# Patient Record
Sex: FEMALE | Race: WHITE | NOT HISPANIC OR LATINO | Employment: OTHER | URBAN - NONMETROPOLITAN AREA
[De-identification: names, ages, dates, MRNs, and addresses within clinical notes are randomized per-mention and may not be internally consistent; named-entity substitution may affect disease eponyms.]

---

## 2017-03-28 ENCOUNTER — TRANSCRIBE ORDERS (OUTPATIENT)
Dept: PHYSICAL THERAPY | Facility: HOSPITAL | Age: 62
End: 2017-03-28

## 2017-03-28 DIAGNOSIS — M54.16 LUMBAR RADICULOPATHY: Primary | ICD-10-CM

## 2017-04-05 ENCOUNTER — HOSPITAL ENCOUNTER (OUTPATIENT)
Dept: PHYSICAL THERAPY | Facility: HOSPITAL | Age: 62
Setting detail: THERAPIES SERIES
Discharge: HOME | End: 2017-04-05

## 2017-04-05 DIAGNOSIS — M54.16 LUMBAR RADICULOPATHY: Primary | ICD-10-CM

## 2017-04-05 PROCEDURE — 97162 PT EVAL MOD COMPLEX 30 MIN: CPT | Performed by: PHYSICAL THERAPIST

## 2017-04-05 PROCEDURE — G8978 MOBILITY CURRENT STATUS: HCPCS | Performed by: PHYSICAL THERAPIST

## 2017-04-05 PROCEDURE — G8979 MOBILITY GOAL STATUS: HCPCS | Performed by: PHYSICAL THERAPIST

## 2017-04-06 NOTE — PROGRESS NOTES
"    Outpatient Physical Therapy Ortho Initial Evaluation  [facility] [place]     Patient Name: Genesis Salgado  : 1955  MRN: [id number]  Today's Date: 2017      Visit Date: 2017  Attendance:  (Medicare cap)  Subjective Improvement: N/A  Next MD Appt: none scheduled  Recert Date: 17    Therapy Diagnosis: s/p lumbar discectomy    There is no problem list on file for this patient.       Past Medical History:   Diagnosis Date   • Adie's syndrome    • Cancer     colon cancer   • Fracture of ankle, closed     R and L   • Hiatal hernia    • Lumbar herniated disc    • Lupus    • Osteoporosis    • Shingles     x2   • T12 compression fracture         Past Surgical History:   Procedure Laterality Date   • BILATERAL BREAST REDUCTION     • COLON RESECTION     • COLON SURGERY     • FIXATION KYPHOPLASTY THORACIC SPINE      T12   • LUMBAR DISCECTOMY     • TRIGGER FINGER RELEASE Right        Visit Dx:     ICD-10-CM ICD-9-CM   1. Lumbar radiculopathy M54.16 724.4     Medications: patient to bring list        Patient History       17 0900          History    Chief Complaint Pain   \"I'm still tight.\"  -      Type of Pain Back pain  -      Date Current Problem(s) Began --   Dec 2016; Surgery 4 weeks ago  -      Brief Description of Current Complaint Moved 20 [employer] boxes up steps. A few days later, her back started hurting her. The week after [place], she slipped and fell down a ramp. A day or 2 after that, her pain increased. Underwent lumbar discectomy approximately 1 month ago. Since surgery, the pain has basically been relieved. She notes a tightness or \"sting-like\" sensation when she bends over. She feels stiff and tight. \"I'm not back to myself.\" She reports that Dr. Strickland told her that she can walk, line dance, swim, and ride bicycle. She is not to run, jog, or lift over 20#.  female with adult children. She resides in a single story house with 3 to enter the house and none inside. " "Her hobbies include: line dancing, mow yard, ride bicycle.   -SS      Patient/Caregiver Goals --   \"I'd like to be where I was before I hurt my back.\"  -SS      Current Tobacco Use none  -SS      Smoking Status no  -SS      Patient's Rating of General Health Good  -SS      Hand Dominance right-handed  -SS      Occupation/sports/leisure activities retired; Hobbies: yard work, line dance, ride bicycle  -SS      Pain     Pain Location Back  -SS      Pain at Present 0  -SS      Pain at Best 0  -SS      Pain at Worst 4   over past 1 month  -SS      Pain Frequency Intermittent  -SS      Pain Description Burning  -SS      What Performance Factors Make the Current Problem(s) WORSE? walk a while, prolonged standing, prolonged dancing  -SS      What Performance Factors Make the Current Problem(s) BETTER? rest, sitting down  -SS      Is your sleep disturbed? No  -SS      Is medication used to assist with sleep? No  -SS      Fall Risk Assessment    Any falls in the past year: Yes  -SS      Number of falls reported in the last 12 months --   <5  -SS      Factors that contributed to the fall: Tripped;Slippery surface  -SS      Does patient have a fear of falling No  -SS      Daily Activities    Primary Language English  -SS      Safety    Are you being hurt, hit, or frightened by anyone at home or in your life? No  -SS      Are you being neglected by a caregiver No  -SS        User Key  (r) = Recorded By, (t) = Taken By, (c) = Cosigned By    Initials Name Provider Type     Magdiel Nava, PT Physical Therapist                PT Ortho       04/05/17 0900    Subjective Comments    Subjective Comments see Therapy Patient History  -SS    Precautions and Contraindications    Precautions no lifting > 20#  -SS    Subjective Pain    Able to rate subjective pain? yes  -SS    Pre-Treatment Pain Level 0  -SS    Post-Treatment Pain Level 0  -SS    Posture/Observations    Alignment Options Thoracic kyphosis;Forward head  -SS    " "Forward Head Mild  -SS    Thoracic Kyphosis Increased  -SS    Posture/Observations Comments Well healed incision low back midline  -SS    Sensation    Additional Comments Sensation decreased to light touch L L3 dermatome and dorsal L foot  -SS    Lumbosacral Palpation    SI --   non-tender  -SS    Lumbosacral Segment --   non-tender  -SS    Thoracolumbar Segment --   non-tender  -SS    Piriformis --   non-tender  -SS    Gluteus Art --   non-tender  -SS    Quadratus Lumborum Right:;Guarded/taut  -SS    Erector Spinae (Paraspinals) Left:;Tender  -SS    Trunk    Flexion AROM Deficit fingertips to tibial tubercles with c/o tightness across low back  -SS    Extension AROM Deficit 50%  -SS    Lt Lat Flexion AROM Deficit fingertips to fibular head w/ c/o tightness  -SS    Right Lateral Flexion AROM Deficit fingertips 2\" prox to fib head w/ c/o tightness  -SS    MMT (Manual Muscle Testing)    General MMT Assessment no strength deficits identified  -SS    General MMT Assessment Detail lower extremity myotomes B 5/5  -SS    Gait Assessment/Treatment    Gait, Comment non-antalgic without deviation noted this date.  -SS      User Key  (r) = Recorded By, (t) = Taken By, (c) = Cosigned By    Initials Name Provider Type    KISHAN Nava, PT Physical Therapist                            Therapy Education       04/05/17 2000          Therapy Education    Given --   proposed therapy POC  -SS      How Provided Verbal  -SS      Provided to Patient  -SS      Level of Understanding Verbalized  -SS        User Key  (r) = Recorded By, (t) = Taken By, (c) = Cosigned By    Initials Name Provider Type    KISHAN Nava, SUKUMAR Physical Therapist                PT OP Goals       04/05/17 2000       PT Short Term Goals    STG Date to Achieve --   deferred  -SS     Long Term Goals    LTG Date to Achieve 05/03/17  -SS     LTG 1 Independent with HEP  -SS     LTG 2 Verbalize body/lifting mechanics without cueing  -SS     LTG 3 " Minimal pain/discomfort with line dancing  -     Time Calculation    PT Goal Re-Cert Due Date 04/26/17  -       User Key  (r) = Recorded By, (t) = Taken By, (c) = Cosigned By    Initials Name Provider Type     Magdiel Nvaa, PT Physical Therapist                PT Assessment/Plan       04/05/17 0900       PT Assessment    Functional Limitations Limitation in home management;Performance in leisure activities  -     Impairments Range of motion;Endurance  -     Assessment Comments Patient is approx 1 month s/p discectomy. Some tightness and discomfort exists. Patient would benefit from body mechanics instruction as well as therapy for ROM, gentle stretching, and core strengthening.  -     Rehab Potential Good  -     Patient/caregiver participated in establishment of treatment plan and goals Yes  -     Patient would benefit from skilled therapy intervention Yes  -SS     PT Plan    PT Frequency 2x/week  -     Predicted Duration of Therapy Intervention (days/wks) 3-4 weeks  -     PT Plan Comments aquatics, ROM, stretching, strengthening MFR, IFC estim with MHP, possible US to LB  -       User Key  (r) = Recorded By, (t) = Taken By, (c) = Cosigned By    Initials Name Provider Type     Magdiel Nava, PT Physical Therapist                  Exercises       04/05/17 0900          Subjective Comments    Subjective Comments see Therapy Patient History  -      Subjective Pain    Able to rate subjective pain? yes  -SS      Pre-Treatment Pain Level 0  -SS      Post-Treatment Pain Level 0  -SS        User Key  (r) = Recorded By, (t) = Taken By, (c) = Cosigned By    Initials Name Provider Type     Magdiel Nava, PT Physical Therapist                              Outcome Measures       04/05/17 2000          Modified Oswestry    Modified Oswestry Score/Comments 20%  -      Functional Assessment    Outcome Measure Options Modifed Owestry  -        User Key  (r) = Recorded By, (t)  = Taken By, (c) = Cosigned By    Initials Name Provider Type    SS Magdiel Nava, PT Physical Therapist            Time Calculation:   Start Time: 0916  Stop Time: 1003  Time Calculation (min): 47 min     Therapy Charges for Today     Code Description Service Date Service Provider Modifiers Qty    63158701240 HC PT MOBILITY CURRENT 4/5/2017 Magdiel Nava, PT GP, CJ 1    04145392252 HC PT MOBILITY PROJECTED 4/5/2017 Magdiel Nava, PT GP, CI 1    18623033017 HC PT EVAL MOD COMPLEXITY 3 4/5/2017 Magdiel Nava, PT GP 1          PT G-Codes  Outcome Measure Options: Modified Owestry  Score: 20%  Functional Limitation: Mobility: Walking and moving around  Mobility: Walking and Moving Around Current Status (): At least 20 percent but less than 40 percent impaired, limited or restricted  Mobility: Walking and Moving Around Goal Status (): At least 1 percent but less than 20 percent impaired, limited or restricted         Magdiel Nava, PT  4/5/2017

## 2017-04-11 ENCOUNTER — HOSPITAL ENCOUNTER (OUTPATIENT)
Dept: PHYSICAL THERAPY | Facility: HOSPITAL | Age: 62
Setting detail: THERAPIES SERIES
Discharge: HOME | End: 2017-04-11

## 2017-04-11 DIAGNOSIS — M54.16 LUMBAR RADICULOPATHY: Primary | ICD-10-CM

## 2017-04-11 PROCEDURE — 97110 THERAPEUTIC EXERCISES: CPT

## 2017-04-11 NOTE — PROGRESS NOTES
Outpatient Physical Therapy Ortho Treatment Note  [facility]  Sapna Potts PTA       Patient Name: Genesis Salgado  : 1955  MRN: [id number]  Today's Date: 2017      Visit Date: 2017     Visits: 2/2  Insurance Visits Approved: based on medical necessity and medicare cap  Recert Due: 2017  MD Appt: none scheduled  Pain: pretreatment 0/10; post treatment 0/10  Improvement: pt is subjectively reporting 0% improvement since initial evaluation    Visit Dx:    ICD-10-CM ICD-9-CM   1. Lumbar radiculopathy M54.16 724.4       There is no problem list on file for this patient.       Past Medical History:   Diagnosis Date   • Adie's syndrome    • Cancer     colon cancer   • Fracture of ankle, closed     R and L   • Hiatal hernia    • Lumbar herniated disc    • Lupus    • Osteoporosis    • Shingles     x2   • T12 compression fracture         Past Surgical History:   Procedure Laterality Date   • BILATERAL BREAST REDUCTION     • COLON RESECTION     • COLON SURGERY     • FIXATION KYPHOPLASTY THORACIC SPINE      T12   • LUMBAR DISCECTOMY     • TRIGGER FINGER RELEASE Right              PT Ortho       17 0900    Subjective Comments    Subjective Comments states that the water really feels good. looking forward to getting back to doing the things that she likes. has gone ahead with line dancing, has some difficulty with backwards motions.   -    Precautions and Contraindications    Precautions no lifting > 20#, no twisting  -    Subjective Pain    Able to rate subjective pain? yes  -    Pre-Treatment Pain Level 0  -    Post-Treatment Pain Level 0  -      User Key  (r) = Recorded By, (t) = Taken By, (c) = Cosigned By    Initials Name Provider Type     Sapna Potts PTA Physical Therapy Assistant                            PT Assessment/Plan       17 1000 17 0900    PT Assessment    Assessment Comments  no increase in complaints with aquatics today. good  performance  -    PT Plan    PT Frequency --  - 2x/week  -    PT Plan Comments  next aquatics, deep water therex  -      User Key  (r) = Recorded By, (t) = Taken By, (c) = Cosigned By    Initials Name Provider Type     Sapna Potts PTA Physical Therapy Assistant                    Exercises       04/11/17 0900          Subjective Comments    Subjective Comments states that the water really feels good. looking forward to getting back to doing the things that she likes. has gone ahead with line dancing, has some difficulty with backwards motions.   -      Subjective Pain    Able to rate subjective pain? yes  -      Pre-Treatment Pain Level 0  -      Post-Treatment Pain Level 0  -      Aquatics    Aquatics performed? Yes  -      Exercise 1    Exercise Name 1 AQUA: WALK FWD/RETRO, LAT, MARCHING  -      Time (Minutes) 1 5 MINS EACH  -      Exercise 2    Exercise Name 2 AQUA: WAND  PADDLE WHEEL  -      Reps 2 10   EACH  -      Exercise 3    Exercise Name 3 AQUA: WAND GENTLE TRUNK ROT  -      Reps 3 20  -MH      Exercise 4    Exercise Name 4 AQUA: MINI SQUAT  -      Reps 4 10  -      Exercise 5    Exercise Name 5 AQUA: HIP SLR 3 WAY  -      Reps 5 10  -        User Key  (r) = Recorded By, (t) = Taken By, (c) = Cosigned By    Initials Name Provider Type     Sapna Potts PTA Physical Therapy Assistant                               PT OP Goals       04/11/17 0900       PT Short Term Goals    STG Date to Achieve --   deferred  -     Long Term Goals    LTG Date to Achieve 05/03/17  -     LTG 1 Independent with HEP  -     LTG 1 Progress Progressing;Ongoing  -     LTG 2 Verbalize body/lifting mechanics without cueing  -     LTG 2 Progress Ongoing;Progressing  -     LTG 3 Minimal pain/discomfort with line dancing  -     LTG 3 Progress Progressing;Ongoing  -     Time Calculation    PT Goal Re-Cert Due Date 04/26/17  -       User Key  (r) = Recorded By, (t) = Taken By,  (c) = Cosigned By    Initials Name Provider Type    CHARISSA Potts PTA Physical Therapy Assistant                Therapy Education       04/11/17 0900          Therapy Education    Given Pain management;Posture/body mechanics;Symptoms/condition management  -MH      Program Reinforced  -MH      How Provided Verbal  -MH      Provided to Patient  -MH      Level of Understanding Verbalized  -MH        User Key  (r) = Recorded By, (t) = Taken By, (c) = Cosigned By    Initials Name Provider Type     Sapna Potts PTA Physical Therapy Assistant                Time Calculation:   Start Time: 0930  Stop Time: 1015  Time Calculation (min): 45 min  Total Timed Code Minutes- PT: 45 minute(s)    Therapy Charges for Today     Code Description Service Date Service Provider Modifiers Qty    14594318474 HC PT THER PROC EA 15 MIN 4/11/2017 Sapna Potts PTA GP 3    17291509449 HC PT THER SUPP EA 15 MIN 4/11/2017 Sapna Potts PTA GP 1                    Sapna Potts PTA  4/11/2017

## 2017-04-13 ENCOUNTER — HOSPITAL ENCOUNTER (OUTPATIENT)
Dept: PHYSICAL THERAPY | Facility: HOSPITAL | Age: 62
Setting detail: THERAPIES SERIES
Discharge: HOME | End: 2017-04-13

## 2017-04-13 DIAGNOSIS — M54.16 LUMBAR RADICULOPATHY: Primary | ICD-10-CM

## 2017-04-13 PROCEDURE — 97110 THERAPEUTIC EXERCISES: CPT

## 2017-04-13 NOTE — PROGRESS NOTES
Outpatient Physical Therapy Ortho Treatment Note  [facility]  Sapna Potts PTA       Patient Name: Genesis Salgado  : 1955  MRN: [id number]  Today's Date: 2017      Visit Date: 2017     Visits: 3/3  Insurance Visits Approved: based on medical necessity  Recert Due: 2017  MD Appt: none scheduled  Pain: pretreatment 0/10; post treatment 0/10  Improvement: pt is subjectively reporting 0% improvement since initial evaluation    Visit Dx:    ICD-10-CM ICD-9-CM   1. Lumbar radiculopathy M54.16 724.4       There is no problem list on file for this patient.       Past Medical History:   Diagnosis Date   • Adie's syndrome    • Cancer     colon cancer   • Fracture of ankle, closed     R and L   • Hiatal hernia    • Lumbar herniated disc    • Lupus    • Osteoporosis    • Shingles     x2   • T12 compression fracture         Past Surgical History:   Procedure Laterality Date   • BILATERAL BREAST REDUCTION     • COLON RESECTION     • COLON SURGERY     • FIXATION KYPHOPLASTY THORACIC SPINE      T12   • LUMBAR DISCECTOMY     • TRIGGER FINGER RELEASE Right              PT Ortho       17 1100    Precautions and Contraindications    Precautions no lifting > 20#, no twisting  -    Subjective Pain    Able to rate subjective pain? yes  -    Pre-Treatment Pain Level 0  -    Post-Treatment Pain Level 0  -MH      17 0900    Subjective Comments    Subjective Comments states that the water really feels good. looking forward to getting back to doing the things that she likes. has gone ahead with line dancing, has some difficulty with backwards motions.   -    Precautions and Contraindications    Precautions no lifting > 20#, no twisting  -    Subjective Pain    Able to rate subjective pain? yes  -    Pre-Treatment Pain Level 0  -    Post-Treatment Pain Level 0  -MH      User Key  (r) = Recorded By, (t) = Taken By, (c) = Cosigned By    Initials Name Provider Type      Sapna Potts PTA Physical Therapy Assistant                            PT Assessment/Plan       04/13/17 1100       PT Assessment    Assessment Comments pelvic assymetry easily corrects with MET today. unable to complete a bridge without complaints of HS cramping  -     PT Plan    PT Frequency 2x/week  -     PT Plan Comments next land visit supine DKTC ball rolls  -MH       User Key  (r) = Recorded By, (t) = Taken By, (c) = Cosigned By    Initials Name Provider Type    CHARISSA Potts PTA Physical Therapy Assistant                Modalities       04/13/17 1100          Ice    Ice Applied Yes  -MH      Location low back  -MH      Rx Minutes 10 mins  -      Ice S/P Rx Yes  -MH        User Key  (r) = Recorded By, (t) = Taken By, (c) = Cosigned By    Initials Name Provider Type     Sapna A ALEXIS Potts Physical Therapy Assistant                Exercises       04/13/17 1100          Subjective Comments    Subjective Comments   states that she had to go to the doctor after last visit for her tick bite due to being concerned about it. states that they added an antibiotic to take. has an appointment in [place] this afternoon. concerned to make sure she will be finished in therapy in time.    -MH      Subjective Pain    Able to rate subjective pain? yes  -MH      Pre-Treatment Pain Level 0  -MH      Post-Treatment Pain Level 0  -MH      Exercise 1    Exercise Name 1 Pro II UE/LE Seat 5  -MH      Resistance 1 --   L 4.0  -MH      Time (Minutes) 1 10 minutes  -      Exercise 2    Exercise Name 2 B St. HS Stretch   -MH      Reps 2 2  -MH      Time (Seconds) 2 30 sec hold  -MH      Exercise 3    Exercise Name 3 B Seated Piriformis S  -MH      Reps 3 2  -MH      Time (Seconds) 3 30 sec hold  -MH      Exercise 4    Exercise Name 4 Supine LTR  -MH      Reps 4 10  -MH      Time (Seconds) 4 10 sec hold  -MH      Exercise 5    Exercise Name 5 PPT with add squeeze  -MH      Sets 5 2  -MH      Reps 5 10  -MH      Time  "(Seconds) 5 5 sec hold  -      Exercise 6    Exercise Name 6 assess allignment/MET to correct   see manual tab  -        User Key  (r) = Recorded By, (t) = Taken By, (c) = Cosigned By    Initials Name Provider Type    CHARISSA Potts PTA Physical Therapy Assistant                        Manual Rx (last 36 hours)      Manual Treatments       04/13/17 1100          Manual Rx 1    Manual Rx 1 Location R LE appears Short; right ant rotation  -      Manual Rx 1 Type HS MET to correct allignment  -      Manual Rx 1 Duration 5\" hold x 4  -        User Key  (r) = Recorded By, (t) = Taken By, (c) = Cosigned By    Initials Name Provider Type    CHARISSA Potts PTA Physical Therapy Assistant                PT OP Goals       04/13/17 1100       PT Short Term Goals    STG Date to Achieve --   deferred  -     Long Term Goals    LTG Date to Achieve 05/03/17  -     LTG 1 Independent with HEP  -     LTG 1 Progress Progressing;Ongoing  -     LTG 2 Verbalize body/lifting mechanics without cueing  -     LTG 2 Progress Ongoing;Progressing  -     LTG 3 Minimal pain/discomfort with line dancing  -     LTG 3 Progress Progressing;Ongoing  -     Time Calculation    PT Goal Re-Cert Due Date 04/26/17  -       User Key  (r) = Recorded By, (t) = Taken By, (c) = Cosigned By    Initials Name Provider Type    CHARISSA Potts PTA Physical Therapy Assistant                Therapy Education       04/13/17 1100          Therapy Education    Given HEP;Symptoms/condition management;Pain management;Posture/body mechanics  -      Program New   LTR, HS S, Piri S, PPT with add squeeze  -      How Provided Verbal;Demonstration;Written  -      Provided to Patient  -      Level of Understanding Verbalized;Demonstrated;Teach back education performed  -        User Key  (r) = Recorded By, (t) = Taken By, (c) = Cosigned By    Initials Name Provider Type    CHARISSA Potts PTA Physical Therapy Assistant    "             Time Calculation:   Start Time: 1103  Stop Time: 1200  Time Calculation (min): 57 min  PT Non-Billable Time (min): 10 min  Total Timed Code Minutes- PT: 47 minute(s)    Therapy Charges for Today     Code Description Service Date Service Provider Modifiers Qty    46383036453 HC PT THER PROC EA 15 MIN 4/13/2017 Sapna Potts PTA GP 3    27259002012 HC PT THER SUPP EA 15 MIN 4/13/2017 Sapna Potts PTA GP 1                    Sapna Potts PTA  4/13/2017

## 2017-04-17 ENCOUNTER — HOSPITAL ENCOUNTER (OUTPATIENT)
Dept: PHYSICAL THERAPY | Facility: HOSPITAL | Age: 62
Setting detail: THERAPIES SERIES
Discharge: HOME | End: 2017-04-17

## 2017-04-17 DIAGNOSIS — M54.16 LUMBAR RADICULOPATHY: Primary | ICD-10-CM

## 2017-04-17 PROCEDURE — 97110 THERAPEUTIC EXERCISES: CPT

## 2017-04-17 NOTE — PROGRESS NOTES
"    Outpatient Physical Therapy Ortho Treatment Note  [facility]  Sapna Potts PTA       Patient Name: Genesis Salgado  : 1955  MRN: [id number]  Today's Date: 2017      Visit Date: 2017     Visits:   Insurance Visits Approved: based on medical necessity and medicare cap  Recert Due: 2017  MD Appt: TBD  Pain: pretreatment 0/10; post treatment 0/10  Improvement: pt is subjectively reporting \"oh yes\"% improvement since initial evaluation    Visit Dx:    ICD-10-CM ICD-9-CM   1. Lumbar radiculopathy M54.16 724.4       There is no problem list on file for this patient.       Past Medical History:   Diagnosis Date   • Adie's syndrome    • Cancer     colon cancer   • Fracture of ankle, closed     R and L   • Hiatal hernia    • Lumbar herniated disc    • Lupus    • Osteoporosis    • Shingles     x2   • T12 compression fracture         Past Surgical History:   Procedure Laterality Date   • BILATERAL BREAST REDUCTION     • COLON RESECTION     • COLON SURGERY     • FIXATION KYPHOPLASTY THORACIC SPINE      T12   • LUMBAR DISCECTOMY     • TRIGGER FINGER RELEASE Right                              PT Assessment/Plan       17 0900       PT Assessment    Assessment Comments has symmetrical pelvic allignment today. good performacne with therex   -     PT Plan    PT Frequency 2x/week  -     PT Plan Comments Next Land visit Sit to Stand with gentle Lx Ext  -       User Key  (r) = Recorded By, (t) = Taken By, (c) = Cosigned By    Initials Name Provider Type     Sapna Potts PTA Physical Therapy Assistant                Modalities       17 0900          Ice    Ice Applied Yes   to go  -        User Key  (r) = Recorded By, (t) = Taken By, (c) = Cosigned By    Initials Name Provider Type     Sapna Potts PTA Physical Therapy Assistant                Exercises       17 0900          Subjective Comments    Subjective Comments states that she realized over the " "weekend how little 20 pounds is and didnt realize that she may have been breaking her doctors precautions before. states \"did you know that a bag of ice is 22#?\"  -      Subjective Pain    Able to rate subjective pain? yes  -      Pre-Treatment Pain Level 0   just a tender feeling in the low back  -      Post-Treatment Pain Level 0  -MH      Exercise 1    Exercise Name 1 Pro II UE/LE Seat 5  -MH      Resistance 1 --   L 5.0  -MH      Time (Minutes) 1 10 minutes  -      Exercise 2    Exercise Name 2 B St. HS Stretch   -MH      Reps 2 2  -MH      Time (Seconds) 2 30 sec hold  -MH      Exercise 3    Exercise Name 3 B Seated Piriformis S  -MH      Reps 3 2  -MH      Time (Seconds) 3 30 sec hold  -MH      Exercise 4    Exercise Name 4 Supine LTR  -MH      Reps 4 10  -MH      Time (Seconds) 4 10 sec hold  -MH      Exercise 5    Exercise Name 5 PPT with add squeeze  -      Sets 5 2  -MH      Reps 5 10  -MH      Time (Seconds) 5 5 sec hold  -MH      Exercise 6    Exercise Name 6 DKTC ball rolls  -      Sets 6 2  -MH      Reps 6 10  -MH      Time (Seconds) 6 5 sec hold  -MH      Exercise 7    Exercise Name 7 assess allignment - symmetrical  -        User Key  (r) = Recorded By, (t) = Taken By, (c) = Cosigned By    Initials Name Provider Type     Sapna Potts, PTA Physical Therapy Assistant                               PT OP Goals       04/17/17 0900       PT Short Term Goals    STG Date to Achieve --   deferred  -     Long Term Goals    LTG Date to Achieve 05/03/17  -     LTG 1 Independent with HEP  -     LTG 1 Progress Progressing;Ongoing  -     LTG 2 Verbalize body/lifting mechanics without cueing  -     LTG 2 Progress Ongoing;Progressing  -     LTG 3 Minimal pain/discomfort with line dancing  -     LTG 3 Progress Progressing;Ongoing  -     Time Calculation    PT Goal Re-Cert Due Date 04/26/17  -       User Key  (r) = Recorded By, (t) = Taken By, (c) = Cosigned By    Initials Name " Provider Type     Sapna Potts PTA Physical Therapy Assistant                Therapy Education       04/17/17 0900          Therapy Education    Given HEP;Symptoms/condition management;Pain management;Posture/body mechanics  -MH      Program Reinforced  -MH      How Provided Verbal  -MH      Provided to Patient  -MH      Level of Understanding Verbalized;Demonstrated  -MH        User Key  (r) = Recorded By, (t) = Taken By, (c) = Cosigned By    Initials Name Provider Type     Sapna Potts PTA Physical Therapy Assistant                Time Calculation:   Start Time: 0930  Stop Time: 1019  Time Calculation (min): 49 min  Total Timed Code Minutes- PT: 49 minute(s)    Therapy Charges for Today     Code Description Service Date Service Provider Modifiers Qty    46109691922 HC PT THER SUPP EA 15 MIN 4/17/2017 Sapna Potts PTA GP 1    90253574463 HC PT THER PROC EA 15 MIN 4/17/2017 Sapna Potts PTA GP 3                    Sapna Potts PTA  4/17/2017

## 2017-04-20 ENCOUNTER — HOSPITAL ENCOUNTER (OUTPATIENT)
Dept: PHYSICAL THERAPY | Facility: HOSPITAL | Age: 62
Setting detail: THERAPIES SERIES
Discharge: HOME | End: 2017-04-20

## 2017-04-20 DIAGNOSIS — M54.16 LUMBAR RADICULOPATHY: Primary | ICD-10-CM

## 2017-04-20 PROCEDURE — 97110 THERAPEUTIC EXERCISES: CPT

## 2017-04-20 NOTE — PROGRESS NOTES
"    Outpatient Physical Therapy Ortho Treatment Note  [facility]  Sapna Potts PTA       Patient Name: Genesis Salgado  : 1955  MRN: [id number]  Today's Date: 2017      Visit Date: 2017     Visits:   Insurance Visits Approved: based on medical necessity and medicare cap  Recert Due: 2017  MD Appt: TBD  Pain: pretreatment 0/10; post treatment /10  Improvement: pt is subjectively reporting \"yes the pain is gone since surgery\"% improvement since initial evaluation    Visit Dx:    ICD-10-CM ICD-9-CM   1. Lumbar radiculopathy M54.16 724.4       There is no problem list on file for this patient.       Past Medical History:   Diagnosis Date   • Adie's syndrome    • Cancer     colon cancer   • Fracture of ankle, closed     R and L   • Hiatal hernia    • Lumbar herniated disc    • Lupus    • Osteoporosis    • Shingles     x2   • T12 compression fracture         Past Surgical History:   Procedure Laterality Date   • BILATERAL BREAST REDUCTION     • COLON RESECTION     • COLON SURGERY     • FIXATION KYPHOPLASTY THORACIC SPINE      T12   • LUMBAR DISCECTOMY     • TRIGGER FINGER RELEASE Right              PT Ortho       17 1300    Subjective Comments    Subjective Comments states that she thinks that the antibiotic that she has been on for the tick bite has caused some fluid in her ears and she feels a bit off balance today.   -    Precautions and Contraindications    Precautions no lifting > 20#, no twisting  -    Subjective Pain    Able to rate subjective pain? yes  -    Pre-Treatment Pain Level 0  -    Post-Treatment Pain Level 0  -      User Key  (r) = Recorded By, (t) = Taken By, (c) = Cosigned By    Initials Name Provider Type     Sapna Potts PTA Physical Therapy Assistant                            PT Assessment/Plan       17 1400 17 1300    PT Assessment    Assessment Comments --  - patient requires several verbal cues for trans ab " contraction with treatment today.   -    PT Plan    PT Frequency --  -MH 2x/week  -    PT Plan Comments --  -MH next deep water deep hang  -MH      User Key  (r) = Recorded By, (t) = Taken By, (c) = Cosigned By    Initials Name Provider Type     Sapna YARIEL Potts PTA Physical Therapy Assistant                    Exercises       04/20/17 1300          Subjective Comments    Subjective Comments states that she thinks that the antibiotic that she has been on for the tick bite has caused some fluid in her ears and she feels a bit off balance today.   -      Subjective Pain    Able to rate subjective pain? yes  -      Pre-Treatment Pain Level 0  -      Post-Treatment Pain Level 0  -      Aquatics    Aquatics performed? Yes  -      Exercise 1    Exercise Name 1 AQUA: WALK FWD/RETRO  -MH      Time (Minutes) 1 5 MIN  -      Exercise 2    Exercise Name 2 AQUA: WALK LAT  -      Time (Minutes) 2 5 MIN  -      Exercise 3    Exercise Name 3 AQUA: MARCH  -      Time (Minutes) 3 5 MIN  -      Exercise 4    Exercise Name 4 AQUA: WAND TRUNK ROT  -MH      Reps 4 10  -MH      Exercise 5    Exercise Name 5 AQUA: WAND PADDLE WHEEL  -      Reps 5 10  -MH      Exercise 6    Exercise Name 6 AQUA: COOKIE PUSH/PULL  -MH      Reps 6 10  -MH      Exercise 7    Exercise Name 7 AQUA: COOKIE 2H PUSH DOWN  -MH      Reps 7 10  -MH      Exercise 8    Exercise Name 8 AQUA: MINI SQUAT  -MH      Reps 8 10  -MH      Exercise 9    Exercise Name 9 AQUA: B ST HIP 3 WAY SLR  -MH      Reps 9 10  -MH      Exercise 10    Exercise Name 10 AQUA: ALT B HAMCURLS  -MH      Reps 10 10  -MH        User Key  (r) = Recorded By, (t) = Taken By, (c) = Cosigned By    Initials Name Provider Type     Sapna Potts PTA Physical Therapy Assistant                               PT OP Goals       04/20/17 1400 04/20/17 1300    PT Short Term Goals    STG Date to Achieve --  -MH --   deferred  -MH    Long Term Goals    LTG Date to Achieve --  -MH  05/03/17  -MH    LTG 1 --  -MH Independent with HEP  -MH    LTG 1 Progress --  -MH Progressing;Ongoing  -MH    LTG 2 --  -MH Verbalize body/lifting mechanics without cueing  -MH    LTG 2 Progress --  -MH Ongoing;Progressing  -MH    LTG 3 --  -MH Minimal pain/discomfort with line dancing  -MH    LTG 3 Progress --  -MH Progressing;Ongoing  -MH    Time Calculation    PT Goal Re-Cert Due Date --  -MH 04/26/17  -      User Key  (r) = Recorded By, (t) = Taken By, (c) = Cosigned By    Initials Name Provider Type     Sapna Potts PTA Physical Therapy Assistant                Therapy Education       04/20/17 1300          Therapy Education    Given HEP;Symptoms/condition management;Pain management;Posture/body mechanics  -      Program Reinforced  -      How Provided Verbal  -MH      Provided to Patient  -MH      Level of Understanding Verbalized  -        User Key  (r) = Recorded By, (t) = Taken By, (c) = Cosigned By    Initials Name Provider Type     Sapna Potts PTA Physical Therapy Assistant                Time Calculation:   Start Time: 1310  Stop Time: 1348  Time Calculation (min): 38 min  Total Timed Code Minutes- PT: 38 minute(s)    Therapy Charges for Today     Code Description Service Date Service Provider Modifiers Qty    78756329304  PT THER PROC EA 15 MIN 4/20/2017 Sapna Potts PTA GP 3    40090145190 HC PT THER SUPP EA 15 MIN 4/20/2017 Sapna Potts PTA GP 1                    Sapna Potts PTA  4/20/2017

## 2017-04-25 ENCOUNTER — HOSPITAL ENCOUNTER (OUTPATIENT)
Dept: PHYSICAL THERAPY | Facility: HOSPITAL | Age: 62
Setting detail: THERAPIES SERIES
Discharge: HOME | End: 2017-04-25

## 2017-04-25 ENCOUNTER — TELEPHONE (OUTPATIENT)
Dept: PHYSICAL THERAPY | Facility: HOSPITAL | Age: 62
End: 2017-04-25

## 2017-04-25 DIAGNOSIS — M54.16 LUMBAR RADICULOPATHY: Primary | ICD-10-CM

## 2017-04-25 PROCEDURE — 97110 THERAPEUTIC EXERCISES: CPT

## 2017-04-25 NOTE — PROGRESS NOTES
Outpatient Physical Therapy Ortho Treatment Note  [facility]     Patient Name: Genesis Salgado  : 1955  MRN: [id number]  Today's Date: 2017      Visit Date: 2017   Pt's pre tx pain 0/10 post tx pain  0/10.    Pt reports ?% improvement.    visits w/ visits approved based on medical neccessity.   Next recert 2017.    MD?    Visit Dx:    ICD-10-CM ICD-9-CM   1. Lumbar radiculopathy M54.16 724.4       There is no problem list on file for this patient.       Past Medical History:   Diagnosis Date   • Adie's syndrome    • Cancer     colon cancer   • Fracture of ankle, closed     R and L   • Hiatal hernia    • Lumbar herniated disc    • Lupus    • Osteoporosis    • Shingles     x2   • T12 compression fracture         Past Surgical History:   Procedure Laterality Date   • BILATERAL BREAST REDUCTION     • COLON RESECTION     • COLON SURGERY     • FIXATION KYPHOPLASTY THORACIC SPINE      T12   • LUMBAR DISCECTOMY     • TRIGGER FINGER RELEASE Right              PT Ortho       17 1500    Subjective Comments    Subjective Comments Pt has no new c/o  -AH    Precautions and Contraindications    Precautions no lifting > 20#, no twisting  -    Subjective Pain    Able to rate subjective pain? yes  -    Pre-Treatment Pain Level 0  -    Post-Treatment Pain Level 0  -AH      User Key  (r) = Recorded By, (t) = Taken By, (c) = Cosigned By    Initials Name Provider Type    BELA Macias PTA Physical Therapy Assistant                            PT Assessment/Plan       17 1600       PT Assessment    Assessment Comments good effort towards tx.  no new goals met.   -     PT Plan    PT Frequency 2x/week  -     PT Plan Comments progress as emanuel   -       User Key  (r) = Recorded By, (t) = Taken By, (c) = Cosigned By    Initials Name Provider Type    BELA Macias PTA Physical Therapy Assistant                    Exercises       17 1500          Subjective  Comments    Subjective Comments Pt has no new c/o  -AH      Subjective Pain    Able to rate subjective pain? yes  -AH      Pre-Treatment Pain Level 0  -AH      Post-Treatment Pain Level 0  -AH      Aquatics    Aquatics performed? Yes  -AH      Exercise 1    Exercise Name 1 AQUA: WALK FWD/RETRO  -AH      Time (Minutes) 1 5 MIN  -AH      Exercise 2    Exercise Name 2 AQUA: WALK LAT  -AH      Time (Minutes) 2 5 MIN  -AH      Exercise 3    Exercise Name 3 AQUA: MARCH  -AH      Time (Minutes) 3 5 MIN  -AH      Exercise 4    Exercise Name 4 AQUA: WAND TRUNK ROT  -AH      Reps 4 10  -AH      Exercise 5    Exercise Name 5 AQUA: WAND PADDLE WHEEL  -AH      Reps 5 10  -AH      Exercise 6    Exercise Name 6 AQUA: COOKIE PUSH/PULL  -AH      Reps 6 10  -AH      Exercise 7    Exercise Name 7 AQUA: COOKIE 2H PUSH DOWN  -AH      Reps 7 10  -AH      Exercise 8    Exercise Name 8 AQUA: MINI SQUAT  -AH      Reps 8 10  -AH      Exercise 9    Exercise Name 9 AQUA: B ST HIP 3 WAY SLR  -AH      Reps 9 10  -AH      Exercise 10    Exercise Name 10 AQUA: DEEP HANG  -AH      Time (Minutes) 10 5  -AH        User Key  (r) = Recorded By, (t) = Taken By, (c) = Cosigned By    Initials Name Provider Type     Colleen Macias PTA Physical Therapy Assistant                               PT OP Goals       04/25/17 1500       PT Short Term Goals    STG Date to Achieve --   deferred  -     Long Term Goals    LTG Date to Achieve 05/03/17  -     LTG 1 Independent with HEP  -     LTG 1 Progress Progressing;Ongoing  -     LTG 2 Verbalize body/lifting mechanics without cueing  -     LTG 2 Progress Ongoing;Progressing  -     LTG 3 Minimal pain/discomfort with line dancing  -     LTG 3 Progress Progressing;Ongoing  -     Time Calculation    PT Goal Re-Cert Due Date 04/26/17  -       User Key  (r) = Recorded By, (t) = Taken By, (c) = Cosigned By    Initials Name Provider Type    BELA Macias PTA Physical Therapy Assistant                     Time Calculation:   Start Time: 1350  Stop Time: 1430  Time Calculation (min): 40 min    Therapy Charges for Today     Code Description Service Date Service Provider Modifiers Qty    93533899400 HC PT THER PROC EA 15 MIN 4/25/2017 Colleen Macias PTA GP 3                    Colleen Macias PTA  4/25/2017

## 2017-04-27 ENCOUNTER — HOSPITAL ENCOUNTER (OUTPATIENT)
Dept: PHYSICAL THERAPY | Facility: HOSPITAL | Age: 62
Setting detail: THERAPIES SERIES
Discharge: HOME | End: 2017-04-27

## 2017-04-27 DIAGNOSIS — M54.16 LUMBAR RADICULOPATHY: Primary | ICD-10-CM

## 2017-04-27 PROCEDURE — 97110 THERAPEUTIC EXERCISES: CPT | Performed by: PHYSICAL THERAPIST

## 2017-04-27 PROCEDURE — G8979 MOBILITY GOAL STATUS: HCPCS | Performed by: PHYSICAL THERAPIST

## 2017-04-27 PROCEDURE — G8980 MOBILITY D/C STATUS: HCPCS | Performed by: PHYSICAL THERAPIST

## 2017-04-27 NOTE — THERAPY DISCHARGE NOTE
"     Outpatient Physical Therapy Ortho Progress Note/Discharge Summary  [facility]  Ernestina Good, PT, DPT, CSCS       Patient Name: Genesis Salgado  : 1955  MRN: [id number]  Today's Date: 2017      Visit Date: 2017     Pt reports 0/10 pain.  Reports \"A lot, not sure exactly how much.\"% of improvement.  Attended 6/6 visits.  Insurance available: Medical necessity  Next MD appt: PRN  Recertification: N/A    Visit Dx:    ICD-10-CM ICD-9-CM   1. Lumbar radiculopathy M54.16 724.4            Past Medical History:   Diagnosis Date   • Adie's syndrome    • Cancer     colon cancer   • Fracture of ankle, closed     R and L   • Hiatal hernia    • Lumbar herniated disc    • Lupus    • Osteoporosis    • Shingles     x2   • T12 compression fracture         Past Surgical History:   Procedure Laterality Date   • BILATERAL BREAST REDUCTION     • COLON RESECTION     • COLON SURGERY     • FIXATION KYPHOPLASTY THORACIC SPINE      T12   • LUMBAR DISCECTOMY     • TRIGGER FINGER RELEASE Right      Number of days off work: N/A    Changes to medications: None noted.    Changes to MD orders: None noted.          PT Ortho       17 1300    Precautions and Contraindications    Precautions no lifting > 20#, no twisting  -AJ    Subjective Pain    Able to rate subjective pain? yes  -AJ    Post-Treatment Pain Level 0  -AJ    Posture/Observations    Alignment Options Thoracic kyphosis;Forward head  -AJ    Forward Head Mild;Increased  -AJ    Thoracic Kyphosis Mild;Increased  -AJ    Posture/Observations Comments Well healed incision low back midline  -AJ    Sensation    Sensation WNL? WFL  -AJ    Light Touch Partial deficits in the LLE  -AJ    Trunk    Flexion AROM --   85°, fingertips, 3\" from toes.  -AJ    Extension AROM --   25°  -AJ    Left Lateral Flexion AROM within functional limits  -AJ    Right Lateral Flexion AROM within functional limits  -AJ    Left Rotation AROM WNL (0-45 degrees)  -AJ    " Right Rotation AROM WNL (0-45 degrees)  -    MMT (Manual Muscle Testing)    General MMT Assessment no strength deficits identified  -    General MMT Assessment Detail B LE 5/5  -    Transfers    Transfer, Comment I with al ltransfers, with good log roll technique.  -    Gait Assessment/Treatment    Gait, [place] Level independent  -    Gait, Comment FWB, non-antalgic gait, no acute distress.  -      04/25/17 1500    Subjective Comments    Subjective Comments Pt has no new c/o  -    Precautions and Contraindications    Precautions no lifting > 20#, no twisting  -    Subjective Pain    Able to rate subjective pain? yes  -    Pre-Treatment Pain Level 0  -    Post-Treatment Pain Level 0  -      User Key  (r) = Recorded By, (t) = Taken By, (c) = Cosigned By    Initials Name Provider Type    PIEDAD Good, PT Physical Therapist     Colleen Macias, PTA Physical Therapy Assistant         Barriers to Rehab: Include significant or possible arthritic/degenerative changes that have occurred within the spine, The patient's generally deconditioned state, significant osteoporosis.    Safety Issues: Fracture Risk            PT Assessment/Plan       04/27/17 1300       PT Assessment    Assessment Comments patient has met all goals and I with final HEP both land and aquatics.  -     Rehab Potential Good  -     Patient/caregiver participated in establishment of treatment plan and goals Yes  -     Patient would benefit from skilled therapy intervention No  -     PT Plan    PT Frequency --   N/A  -     Predicted Duration of Therapy Intervention (days/wks) N/A  -     PT Plan Comments D/C today with final HEp and free 30 day fiytness formula membership.  -       User Key  (r) = Recorded By, (t) = Taken By, (c) = Cosigned By    Initials Name Provider Type    PIEDAD Good, PT Physical Therapist                Modalities       04/27/17 1300          Ice    Ice Applied Yes   to go.   -AJ      Patient reports will apply ice at home to involved area Yes  -AJ        User Key  (r) = Recorded By, (t) = Taken By, (c) = Cosigned By    Initials Name Provider Type    PIEDAD Good, PT Physical Therapist                Exercises       04/27/17 1300          Subjective Comments    Subjective Comments Patient reports she did some yeard work yesterday and i ssore, but no pain. She also reports that she has gone back to line dancing with some modificatins and that is not painful either.  -AJ      Subjective Pain    Able to rate subjective pain? yes  -AJ      Pre-Treatment Pain Level 0  -AJ      Post-Treatment Pain Level 0  -AJ      Exercise 1    Exercise Name 1 Pro II UE/LE s=5  -AJ      Resistance 1 --   Level 5.0  -AJ      Time (Minutes) 1 10 minutes  -AJ      Exercise 2    Exercise Name 2 B St. HS S  -AJ      Reps 2 2  -AJ      Time (Seconds) 2 30 sec hold  -AJ      Exercise 3    Exercise Name 3 B Seated piriformis S  -AJ      Reps 3 2  -AJ      Time (Seconds) 3 30 sec hold  -AJ      Exercise 4    Exercise Name 4 Supine LTR  -AJ      Reps 4 10  -AJ      Time (Seconds) 4 10 sec hold  -AJ      Exercise 5    Exercise Name 5 PPT with add  -AJ      Sets 5 2  -AJ      Reps 5 10  -AJ      Time (Seconds) 5 5 sec hold  -AJ      Exercise 6    Exercise Name 6 DKTC with P.ball  -AJ      Sets 6 2  -AJ      Reps 6 10  -AJ      Time (Seconds) 6 5 sec hold  -AJ      Exercise 7    Exercise Name 7 Bridges with add  -AJ      Sets 7 2  -AJ      Reps 7 10  -AJ      Time (Seconds) 7 no hold  -AJ        User Key  (r) = Recorded By, (t) = Taken By, (c) = Cosigned By    Initials Name Provider Type    PIEDAD Good, PT Physical Therapist                               PT OP Goals       04/27/17 1300       PT Short Term Goals    STG Date to Achieve --   deferred  -AJ     Long Term Goals    LTG Date to Achieve 05/03/17  -AJ     LTG 1 Independent with HEP  -AJ     LTG 1 Progress Met  -AJ     LTG 2 Verbalize  body/lifting mechanics without cueing  -AJ     LTG 2 Progress Met  -AJ     LTG 3 Minimal pain/discomfort with line dancing  -AJ     LTG 3 Progress Met  -AJ     Time Calculation    PT Goal Re-Cert Due Date --   N/A  -AJ       User Key  (r) = Recorded By, (t) = Taken By, (c) = Cosigned By    Initials Name Provider Type    PIEDAD Good PT Physical Therapist                Therapy Education       04/27/17 1300          Therapy Education    Given HEP;Symptoms/condition management;Pain management;Posture/body mechanics;Fall prevention and home safety;Mobility training   Plan of Care  -AJ      Program Reinforced  -AJ      How Provided Verbal  -AJ      Provided to Patient  -AJ      Level of Understanding Verbalized;Demonstrated  -AJ        User Key  (r) = Recorded By, (t) = Taken By, (c) = Cosigned By    Initials Name Provider Type    PIEDAD Good PT Physical Therapist                Outcome Measures       04/27/17 1300          Modified Oswestry    Modified Oswestry Score/Comments 20%  -PIEDAD      Functional Assessment    Outcome Measure Options Modifed Owestry  -PIEDAD        User Key  (r) = Recorded By, (t) = Taken By, (c) = Cosigned By    Initials Name Provider Type    PIEDAD Good PT Physical Therapist            Time Calculation:   Start Time: 1302  Stop Time: 1355  Time Calculation (min): 53 min  Total Timed Code Minutes- PT: 53 minute(s)    Therapy Charges for Today     Code Description Service Date Service Provider Modifiers Qty    33117976780 HC PT MOBILITY PROJECTED 4/27/2017 Ernestina Good PT GP, CH 1    53865042523 HC PT MOBILITY DISCHARGE 4/27/2017 Ernestina Good PT GP, CI 1    34953468665 HC PT THER PROC EA 15 MIN 4/27/2017 Ernestina Good PT GP 4    23451414922 HC PT THER SUPP EA 15 MIN 4/27/2017 Ernestina Good PT GP 1          PT G-Codes  Outcome Measure Options: Modifed O[place]ry  Functional Limitation: Mobility: Walking and moving around  Mobility:  Walking and Moving Around Goal Status (): 0 percent impaired, limited or restricted  Mobility: Walking and Moving Around Discharge Status (): At least 1 percent but less than 20 percent impaired, limited or restricted     OP PT Discharge Summary  Date of Discharge: 04/27/17  Reason for Discharge: All goals achieved, Independent  Outcomes Achieved: Able to achieve all goals within established timeline  Discharge Destination: Home with home program  Discharge Instructions: D/C with free 30day fitness formula membership.      Ernestina Good, PT, DPT, CSCS  4/27/2017

## 2017-05-01 ENCOUNTER — APPOINTMENT (OUTPATIENT)
Dept: PHYSICAL THERAPY | Facility: HOSPITAL | Age: 62
End: 2017-05-01

## 2017-05-03 ENCOUNTER — APPOINTMENT (OUTPATIENT)
Dept: PHYSICAL THERAPY | Facility: HOSPITAL | Age: 62
End: 2017-05-03

## 2018-01-18 ENCOUNTER — OFFICE VISIT (OUTPATIENT)
Dept: FAMILY MEDICINE CLINIC | Facility: CLINIC | Age: 63
End: 2018-01-18

## 2018-01-18 VITALS
HEART RATE: 68 BPM | TEMPERATURE: 98.2 F | OXYGEN SATURATION: 98 % | DIASTOLIC BLOOD PRESSURE: 80 MMHG | HEIGHT: 66 IN | SYSTOLIC BLOOD PRESSURE: 120 MMHG | RESPIRATION RATE: 20 BRPM | BODY MASS INDEX: 26.69 KG/M2 | WEIGHT: 166.1 LBS

## 2018-01-18 DIAGNOSIS — Z13.29 SCREENING FOR THYROID DISORDER: ICD-10-CM

## 2018-01-18 DIAGNOSIS — E55.9 VITAMIN D DEFICIENCY DISEASE: ICD-10-CM

## 2018-01-18 DIAGNOSIS — M32.9 SYSTEMIC LUPUS ERYTHEMATOSUS, UNSPECIFIED SLE TYPE, UNSPECIFIED ORGAN INVOLVEMENT STATUS (HCC): Primary | ICD-10-CM

## 2018-01-18 DIAGNOSIS — Z11.59 ENCOUNTER FOR HEPATITIS C SCREENING TEST FOR LOW RISK PATIENT: ICD-10-CM

## 2018-01-18 DIAGNOSIS — Z12.31 ENCOUNTER FOR SCREENING MAMMOGRAM FOR BREAST CANCER: ICD-10-CM

## 2018-01-18 DIAGNOSIS — Z87.39 HISTORY OF OSTEOPOROSIS: ICD-10-CM

## 2018-01-18 DIAGNOSIS — Z13.220 SCREENING FOR LIPOID DISORDERS: ICD-10-CM

## 2018-01-18 DIAGNOSIS — M89.9 DISORDER OF BONE: ICD-10-CM

## 2018-01-18 PROCEDURE — 99203 OFFICE O/P NEW LOW 30 MIN: CPT | Performed by: NURSE PRACTITIONER

## 2018-01-18 RX ORDER — FEXOFENADINE HCL AND PSEUDOEPHEDRINE HCI 180; 240 MG/1; MG/1
1 TABLET, EXTENDED RELEASE ORAL DAILY
Qty: 90 TABLET | Refills: 4 | Status: SHIPPED | OUTPATIENT
Start: 2018-01-18 | End: 2018-05-29 | Stop reason: SDUPTHER

## 2018-01-18 RX ORDER — MECLIZINE HYDROCHLORIDE 25 MG/1
25 TABLET ORAL 3 TIMES DAILY PRN
COMMUNITY
End: 2018-01-18 | Stop reason: SDUPTHER

## 2018-01-18 RX ORDER — SUCRALFATE 1 G/1
1 TABLET ORAL 4 TIMES DAILY
Qty: 480 TABLET | Refills: 4 | Status: SHIPPED | OUTPATIENT
Start: 2018-01-18 | End: 2020-03-09

## 2018-01-18 RX ORDER — ERGOCALCIFEROL 1.25 MG/1
50000 CAPSULE ORAL
Qty: 12 CAPSULE | Refills: 3 | Status: SHIPPED | OUTPATIENT
Start: 2018-01-18 | End: 2019-02-22 | Stop reason: SDUPTHER

## 2018-01-18 RX ORDER — DEXLANSOPRAZOLE 60 MG/1
60 CAPSULE, DELAYED RELEASE ORAL DAILY
Qty: 90 CAPSULE | Refills: 4 | Status: SHIPPED | OUTPATIENT
Start: 2018-01-18 | End: 2018-05-29 | Stop reason: SDUPTHER

## 2018-01-18 RX ORDER — HYDROXYCHLOROQUINE SULFATE 200 MG/1
200 TABLET, FILM COATED ORAL DAILY
Qty: 90 TABLET | Refills: 4 | Status: SHIPPED | OUTPATIENT
Start: 2018-01-18 | End: 2019-04-02 | Stop reason: SDUPTHER

## 2018-01-18 RX ORDER — HYDROXYCHLOROQUINE SULFATE 200 MG/1
200 TABLET, FILM COATED ORAL DAILY
COMMUNITY
End: 2018-01-18 | Stop reason: SDUPTHER

## 2018-01-18 RX ORDER — MECLIZINE HYDROCHLORIDE 25 MG/1
25 TABLET ORAL 3 TIMES DAILY PRN
Qty: 270 TABLET | Refills: 4 | Status: SHIPPED | OUTPATIENT
Start: 2018-01-18 | End: 2019-03-12 | Stop reason: SDUPTHER

## 2018-01-18 RX ORDER — SUCRALFATE 1 G/1
1 TABLET ORAL 4 TIMES DAILY
COMMUNITY
End: 2018-01-18 | Stop reason: SDUPTHER

## 2018-01-18 RX ORDER — FEXOFENADINE HCL AND PSEUDOEPHEDRINE HCI 180; 240 MG/1; MG/1
1 TABLET, EXTENDED RELEASE ORAL DAILY
COMMUNITY
End: 2018-01-18 | Stop reason: SDUPTHER

## 2018-01-18 RX ORDER — DEXLANSOPRAZOLE 60 MG/1
60 CAPSULE, DELAYED RELEASE ORAL DAILY
COMMUNITY
End: 2018-01-18 | Stop reason: SDUPTHER

## 2018-01-18 NOTE — PATIENT INSTRUCTIONS
Systemic Lupus Erythematosus, Adult  Systemic lupus erythematosus is a long-term (chronic) disease that can affect many parts of the body. It can damage the skin, joints, blood vessels, brain, kidneys, lungs, heart, and other internal organs. It causes pain, irritation, and inflammation.  Systemic lupus erythematosus is an autoimmune disease. With this type of disease, the body’s defense system (immune system) mistakenly attacks normal tissues instead of attacking germs or abnormal growths.  What are the causes?  The cause of this condition is not known.  What increases the risk?  This condition is more likely to develop in:  · Females.  · People of  descent.  · People of -American descent.  · People who have a family history of the condition.  What are the signs or symptoms?  General symptoms include:  · Joint pain and swelling (common).  · Fever.  · Fatigue.  · Unusual weight loss or weight gain.  · Skin rashes, especially over the nose and cheeks (butterfly rash) and after sun exposure.  · Sores inside the mouth or nose.  Other symptoms depend on which parts of the body are affected. They can include:  · Shortness of breath.  · Chest pain.  · Frequent urination.  · Blood in the urine.  · Seizures.  · Mental changes.  · Hair loss.  · Swollen and tender lymph nodes.  · Swelling of the hands or feet.  Symptoms can come and go. A period of time when symptoms get worse or come back is called a flare. A period of time with no symptoms is called a remission.  How is this diagnosed?  This condition is diagnosed based on symptoms, a medical history, and a physical exam. You may also have tests, including:  · Blood tests.  · Urine tests.  · A chest X-ray.  · A skin or kidney biopsy. For this test, a sample of tissue is taken from the skin or kidney and studied under a microscope.  You may be referred to an autoimmune disease specialist (rheumatologist).  How is this treated?  There is no cure for this  condition, but treatment can keep the disease in remission, help to control symptoms, and prevent damage to the heart, lungs, kidneys, and other organs. Treatment may involve taking a combination of medicines over time.  Follow these instructions at home:  Medicines  · Take medicines only as directed by your health care provider.  · Do not take any medicines that contain estrogen without first checking with your health care provider. Estrogen can trigger flares and may increase your risk for blood clots.  Lifestyle  · Eat a heart-healthy diet.  · Stay active as directed by your health care provider.  · Do not smoke. If you need help quitting, ask your health care provider.  · Protect your skin from the sun by applying sunblock and wearing protective hats and clothing.  · Learn as much as you can about your condition and have a good support system in place. Support may come from family, friends, or a lupus support group.  General instructions  · Keep all follow-up visits as directed by your health care provider. This is important.  · Work closely with all of your health care providers to manage your condition.  · Let your health care provider know right away if you become pregnant or if you plan to become pregnant. Pregnancy in women with this condition is considered high risk.  Contact a health care provider if:  · You have a fever.  · Your symptoms flare.  · You develop new symptoms.  · You develop swollen feet or hands.  · You develop puffiness around your eyes.  · Your medicines are not working.  · You have bloody, foamy, or coffee-colored urine.  · There are changes in your urination. For example, you urinate more often at night.  · You think that you may be depressed or have anxiety.  Get help right away if:  · You have chest pain.  · You have trouble breathing.  · You have a seizure.  · You suddenly get a very bad headache.  · You suddenly develop facial or body weakness.  · You cannot speak.  · You cannot  understand speech.  This information is not intended to replace advice given to you by your health care provider. Make sure you discuss any questions you have with your health care provider.  Document Released: 12/08/2003 Document Revised: 08/13/2017 Document Reviewed: 11/25/2015  [employer] Interactive Patient Education © 2017 [employer]    Preventive Care 40-64 Years, Female  Preventive care refers to lifestyle choices and visits with your health care provider that can promote health and wellness.  What does preventive care include?  · A yearly physical exam. This is also called an annual well check.  · Dental exams once or twice a year.  · Routine eye exams. Ask your health care provider how often you should have your eyes checked.  · Personal lifestyle choices, including:  ¨ Daily care of your teeth and gums.  ¨ Regular physical activity.  ¨ Eating a healthy diet.  ¨ Avoiding tobacco and drug use.  ¨ Limiting alcohol use.  ¨ Practicing safe sex.  ¨ Taking low-dose aspirin daily starting at age 50.  ¨ Taking vitamin and mineral supplements as recommended by your health care provider.  What happens during an annual well check?  The services and screenings done by your health care provider during your annual well check will depend on your age, overall health, lifestyle risk factors, and family history of disease.  Counseling   Your health care provider may ask you questions about your:  · Alcohol use.  · Tobacco use.  · Drug use.  · Emotional well-being.  · Home and relationship well-being.  · Sexual activity.  · Eating habits.  · Work and work environment.  · Method of birth control.  · Menstrual cycle.  · Pregnancy history.  Screening   You may have the following tests or measurements:  · Height, weight, and BMI.  · Blood pressure.  · Lipid and cholesterol levels. These may be checked every 5 years, or more frequently if you are over 50 years old.  · Skin check.  · Lung cancer screening. You may have this  screening every year starting at age 55 if you have a 30-pack-year history of smoking and currently smoke or have quit within the past 15 years.  · Fecal occult blood test (FOBT) of the stool. You may have this test every year starting at age 50.  · Flexible sigmoidoscopy or colonoscopy. You may have a sigmoidoscopy every 5 years or a colonoscopy every 10 years starting at age 50.  · Hepatitis C blood test.  · Hepatitis B blood test.  · Sexually transmitted disease (STD) testing.  · Diabetes screening. This is done by checking your blood sugar (glucose) after you have not eaten for a while (fasting). You may have this done every 1-3 years.  · Mammogram. This may be done every 1-2 years. Talk to your health care provider about when you should start having regular mammograms. This may depend on whether you have a family history of breast cancer.  · BRCA-related cancer screening. This may be done if you have a family history of breast, ovarian, tubal, or peritoneal cancers.  · Pelvic exam and Pap test. This may be done every 3 years starting at age 21. Starting at age 30, this may be done every 5 years if you have a Pap test in combination with an HPV test.  · Bone density scan. This is done to screen for osteoporosis. You may have this scan if you are at high risk for osteoporosis.  Discuss your test results, treatment options, and if necessary, the need for more tests with your health care provider.  Vaccines   Your health care provider may recommend certain vaccines, such as:  · Influenza vaccine. This is recommended every year.  · Tetanus, diphtheria, and acellular pertussis (Tdap, Td) vaccine. You may need a Td booster every 10 years.  · Varicella vaccine. You may need this if you have not been vaccinated.  · Zoster vaccine. You may need this after age 60.  · Measles, mumps, and rubella (MMR) vaccine. You may need at least one dose of MMR if you were born in 1957 or later. You may also need a second  dose.  · Pneumococcal 13-valent conjugate (PCV13) vaccine. You may need this if you have certain conditions and were not previously vaccinated.  · Pneumococcal polysaccharide (PPSV23) vaccine. You may need one or two doses if you smoke cigarettes or if you have certain conditions.  · Meningococcal vaccine. You may need this if you have certain conditions.  · Hepatitis A vaccine. You may need this if you have certain conditions or if you travel or work in places where you may be exposed to hepatitis A.  · Hepatitis B vaccine. You may need this if you have certain conditions or if you travel or work in places where you may be exposed to hepatitis B.  · Haemophilus influenzae type b (Hib) vaccine. You may need this if you have certain conditions.  Talk to your health care provider about which screenings and vaccines you need and how often you need them.  This information is not intended to replace advice given to you by your health care provider. Make sure you discuss any questions you have with your health care provider.  Document Released: 01/13/2017 Document Revised: 09/06/2017 Document Reviewed: 10/18/2016  [employer] Interactive Patient Education © 2017 [employer]

## 2018-01-25 PROBLEM — M32.9 SYSTEMIC LUPUS ERYTHEMATOSUS (HCC): Status: ACTIVE | Noted: 2018-01-25

## 2018-01-25 PROBLEM — E55.9 VITAMIN D DEFICIENCY DISEASE: Status: ACTIVE | Noted: 2018-01-25

## 2018-01-25 PROBLEM — Z87.39 HISTORY OF OSTEOPOROSIS: Status: ACTIVE | Noted: 2018-01-25

## 2018-01-25 NOTE — PROGRESS NOTES
Subjective   Genesis Salgado is a 63 y.o. female. She presents today to [facility].  Her PCP retired so she is looking to establish with someone else local.  Suffers from SLE, osteoporosis, chronic arthritis, and hearburn.  Symptoms are well controlled on there current medications.  She needs a referral to establish with a new rheumatologist as hers has also recently retired.  Due to mammogram and DXA scan.  Also appears to be due for fasting labs.    Arthritis   Presents for follow-up visit. She reports no pain, stiffness, joint swelling or joint warmth. The symptoms have been stable. Associated symptoms include fatigue. Pertinent negatives include no diarrhea, dry eyes, dry mouth, dysuria, fever, pain at night, pain while resting, rash, Raynaud's syndrome, uveitis or weight loss. Compliance with total regimen is 51-75%. Compliance with medications is 51-75%.        The following portions of the patient's history were reviewed and updated as appropriate: allergies, current medications, past family history, past medical history, past social history, past surgical history and problem list.    Review of Systems   Constitutional: Positive for fatigue. Negative for fever and weight loss.   Respiratory: Negative.  Negative for shortness of breath.    Cardiovascular: Negative.  Negative for chest pain and palpitations.   Gastrointestinal: Negative for diarrhea.   Genitourinary: Negative for dysuria.   Musculoskeletal: Positive for arthritis. Negative for joint swelling, neck pain and stiffness.   Skin: Negative.  Negative for rash.   Neurological: Negative for headaches.       Objective   Physical Exam   Constitutional: She is oriented to person, place, and time. Vital signs are normal. She appears well-developed and well-nourished. No distress.   HENT:   Head: Normocephalic.   Right Ear: External ear normal.   Left Ear: External ear normal.   Nose: Nose normal.   Mouth/Throat: Oropharynx is clear and moist.   Eyes: EOM are  normal. Pupils are equal, round, and reactive to light.   Neck: Normal range of motion. Neck supple. No JVD present. No thyromegaly present.   Cardiovascular: Normal rate and regular rhythm.  Exam reveals no gallop and no friction rub.    No murmur heard.  Pulmonary/Chest: Effort normal and breath sounds normal. No respiratory distress. She has no wheezes. She has no rales.   Musculoskeletal: Normal range of motion.   Neurological: She is alert and oriented to person, place, and time.   Skin: Skin is warm and dry. No rash noted. She is not diaphoretic. No erythema. No pallor.   Psychiatric: She has a normal mood and affect. Her behavior is normal. Judgment and thought content normal.   Nursing note and vitals reviewed.      Assessment/Plan   Genesis was seen today for establish care.    Diagnoses and all orders for this visit:    Systemic lupus erythematosus, unspecified SLE type, unspecified organ involvement status  -     Ambulatory Referral to Rheumatology  -     hydroxychloroquine (PLAQUENIL) 200 MG tablet; Take 1 tablet by mouth Daily.  -     CBC (No Diff)  -     Comprehensive Metabolic Panel    Vitamin D deficiency disease  -     vitamin D (ERGOCALCIFEROL) 17679 units capsule capsule; Take 1 capsule by mouth Every 7 (Seven) Days.  -     Vitamin D 25 Hydroxy    History of osteoporosis  -     DEXA Bone Density Axial    Encounter for screening mammogram for breast cancer  -     Mammo Screening Bilateral With CAD    Disorder of bone   -     DEXA Bone Density Axial    Screening for lipoid disorders  -     Lipid Panel    Screening for thyroid disorder  -     TSH    Encounter for hepatitis C screening test for low risk patient  -     Hepatitis C Antibody    Other orders  -     dexlansoprazole (DEXILANT) 60 MG capsule; Take 1 capsule by mouth Daily.  -     fexofenadine-pseudoephedrine (ALLEGRA-D 24) 180-240 MG per 24 hr tablet; Take 1 tablet by mouth Daily.  -     meclizine (ANTIVERT) 25 MG tablet; Take 1 tablet by  mouth 3 (Three) Times a Day As Needed for dizziness.  -     sucralfate (CARAFATE) 1 g tablet; Take 1 tablet by mouth 4 (Four) Times a Day.    Fasting labs.  Schedule for mammo and DXA scan as these are both due.   Referral to rheumatology to re-establish care.  Continue current medications.  Follow up in 6 months for routine follow up.  Follow up sooner for problems/concerns.  Patient verbalized understanding and agreement with plan of care.        This document has been electronically signed by TEDDY Zelaya on January 25, 2018 12:35 PM

## 2018-01-29 PROCEDURE — 80053 COMPREHEN METABOLIC PANEL: CPT | Performed by: NURSE PRACTITIONER

## 2018-01-29 PROCEDURE — 82306 VITAMIN D 25 HYDROXY: CPT | Performed by: NURSE PRACTITIONER

## 2018-01-29 PROCEDURE — 85027 COMPLETE CBC AUTOMATED: CPT | Performed by: NURSE PRACTITIONER

## 2018-01-29 PROCEDURE — 80061 LIPID PANEL: CPT | Performed by: NURSE PRACTITIONER

## 2018-01-29 PROCEDURE — 86803 HEPATITIS C AB TEST: CPT | Performed by: NURSE PRACTITIONER

## 2018-01-29 PROCEDURE — 36415 COLL VENOUS BLD VENIPUNCTURE: CPT | Performed by: NURSE PRACTITIONER

## 2018-01-29 PROCEDURE — 84443 ASSAY THYROID STIM HORMONE: CPT | Performed by: NURSE PRACTITIONER

## 2018-01-30 LAB
25(OH)D3 SERPL-MCNC: 19.3 NG/ML (ref 30–100)
ALBUMIN SERPL-MCNC: 4 G/DL (ref 3.4–4.8)
ALBUMIN/GLOB SERPL: 1.4 G/DL (ref 1.1–1.8)
ALP SERPL-CCNC: 89 U/L (ref 38–126)
ALT SERPL W P-5'-P-CCNC: 33 U/L (ref 9–52)
ANION GAP SERPL CALCULATED.3IONS-SCNC: 10 MMOL/L (ref 5–15)
ARTICHOKE IGE QN: 99 MG/DL (ref 1–129)
AST SERPL-CCNC: 25 U/L (ref 14–36)
BILIRUB SERPL-MCNC: 0.4 MG/DL (ref 0.2–1.3)
BUN BLD-MCNC: 15 MG/DL (ref 7–21)
BUN/CREAT SERPL: 18.8 (ref 7–25)
CALCIUM SPEC-SCNC: 9.5 MG/DL (ref 8.4–10.2)
CHLORIDE SERPL-SCNC: 102 MMOL/L (ref 95–110)
CHOLEST SERPL-MCNC: 183 MG/DL (ref 0–199)
CO2 SERPL-SCNC: 27 MMOL/L (ref 22–31)
CREAT BLD-MCNC: 0.8 MG/DL (ref 0.5–1)
DEPRECATED RDW RBC AUTO: 45.4 FL (ref 36.4–46.3)
ERYTHROCYTE [DISTWIDTH] IN BLOOD BY AUTOMATED COUNT: 13 % (ref 11.5–14.5)
GFR SERPL CREATININE-BSD FRML MDRD: 72 ML/MIN/1.73 (ref 45–104)
GLOBULIN UR ELPH-MCNC: 2.9 GM/DL (ref 2.3–3.5)
GLUCOSE BLD-MCNC: 79 MG/DL (ref 60–100)
HCT VFR BLD AUTO: 42 % (ref 35–45)
HDLC SERPL-MCNC: 71 MG/DL (ref 60–200)
HGB BLD-MCNC: 13.8 G/DL (ref 12–15.5)
LDLC/HDLC SERPL: 1.41 {RATIO} (ref 0–3.22)
MCH RBC QN AUTO: 31.2 PG (ref 26.5–34)
MCHC RBC AUTO-ENTMCNC: 32.9 G/DL (ref 31.4–36)
MCV RBC AUTO: 94.8 FL (ref 80–98)
PLATELET # BLD AUTO: 268 10*3/MM3 (ref 150–450)
PMV BLD AUTO: 10.7 FL (ref 8–12)
POTASSIUM BLD-SCNC: 4.8 MMOL/L (ref 3.5–5.1)
PROT SERPL-MCNC: 6.9 G/DL (ref 6.3–8.6)
RBC # BLD AUTO: 4.43 10*6/MM3 (ref 3.77–5.16)
SODIUM BLD-SCNC: 139 MMOL/L (ref 137–145)
TRIGL SERPL-MCNC: 61 MG/DL (ref 20–199)
TSH SERPL DL<=0.05 MIU/L-ACNC: 1.29 MIU/ML (ref 0.46–4.68)
WBC NRBC COR # BLD: 5.04 10*3/MM3 (ref 3.2–9.8)

## 2018-01-31 LAB — HCV AB SER DONR QL: NEGATIVE

## 2018-02-02 ENCOUNTER — TELEPHONE (OUTPATIENT)
Dept: FAMILY MEDICINE CLINIC | Facility: CLINIC | Age: 63
End: 2018-02-02

## 2018-02-02 NOTE — TELEPHONE ENCOUNTER
----- Message from TEDDY Zelaya sent at 2/1/2018  1:30 PM CST -----  Vitamin D is low.  Is she currently taking a vitamin D supplement?

## 2018-03-22 RX ORDER — FEXOFENADINE HCL AND PSEUDOEPHEDRINE HCI 180; 240 MG/1; MG/1
1 TABLET, EXTENDED RELEASE ORAL DAILY
Qty: 90 TABLET | Refills: 4 | OUTPATIENT
Start: 2018-03-22

## 2018-05-29 ENCOUNTER — OFFICE VISIT (OUTPATIENT)
Dept: FAMILY MEDICINE CLINIC | Facility: CLINIC | Age: 63
End: 2018-05-29

## 2018-05-29 ENCOUNTER — TELEPHONE (OUTPATIENT)
Dept: FAMILY MEDICINE CLINIC | Facility: CLINIC | Age: 63
End: 2018-05-29

## 2018-05-29 VITALS
HEIGHT: 66 IN | DIASTOLIC BLOOD PRESSURE: 80 MMHG | SYSTOLIC BLOOD PRESSURE: 120 MMHG | RESPIRATION RATE: 20 BRPM | TEMPERATURE: 97.6 F | HEART RATE: 79 BPM | WEIGHT: 161.8 LBS | BODY MASS INDEX: 26 KG/M2 | OXYGEN SATURATION: 96 %

## 2018-05-29 DIAGNOSIS — M32.9 SYSTEMIC LUPUS ERYTHEMATOSUS, UNSPECIFIED SLE TYPE, UNSPECIFIED ORGAN INVOLVEMENT STATUS (HCC): ICD-10-CM

## 2018-05-29 DIAGNOSIS — E55.9 VITAMIN D DEFICIENCY DISEASE: ICD-10-CM

## 2018-05-29 DIAGNOSIS — M54.50 ACUTE BILATERAL LOW BACK PAIN WITHOUT SCIATICA: Primary | ICD-10-CM

## 2018-05-29 DIAGNOSIS — Z87.39 HISTORY OF OSTEOPOROSIS: ICD-10-CM

## 2018-05-29 PROCEDURE — 99214 OFFICE O/P EST MOD 30 MIN: CPT | Performed by: NURSE PRACTITIONER

## 2018-05-29 RX ORDER — DEXLANSOPRAZOLE 60 MG/1
60 CAPSULE, DELAYED RELEASE ORAL DAILY
Qty: 90 CAPSULE | Refills: 4 | Status: CANCELLED | OUTPATIENT
Start: 2018-05-29

## 2018-05-29 RX ORDER — DEXLANSOPRAZOLE 60 MG/1
60 CAPSULE, DELAYED RELEASE ORAL DAILY
Qty: 90 CAPSULE | Refills: 4 | Status: SHIPPED | OUTPATIENT
Start: 2018-05-29 | End: 2019-05-15 | Stop reason: SDUPTHER

## 2018-05-29 RX ORDER — FEXOFENADINE HCL AND PSEUDOEPHEDRINE HCI 180; 240 MG/1; MG/1
1 TABLET, EXTENDED RELEASE ORAL DAILY
Qty: 90 TABLET | Refills: 4 | Status: CANCELLED | OUTPATIENT
Start: 2018-05-29

## 2018-05-29 RX ORDER — METHYLPREDNISOLONE 4 MG/1
TABLET ORAL
Qty: 21 EACH | Refills: 0 | Status: SHIPPED | OUTPATIENT
Start: 2018-05-29 | End: 2018-07-19

## 2018-05-29 RX ORDER — FEXOFENADINE HCL AND PSEUDOEPHEDRINE HCI 180; 240 MG/1; MG/1
1 TABLET, EXTENDED RELEASE ORAL DAILY
Qty: 90 TABLET | Refills: 4 | Status: SHIPPED | OUTPATIENT
Start: 2018-05-29 | End: 2018-11-08 | Stop reason: SDUPTHER

## 2018-05-29 RX ORDER — TIZANIDINE 4 MG/1
4 TABLET ORAL EVERY 8 HOURS PRN
Qty: 90 TABLET | Refills: 0 | Status: SHIPPED | OUTPATIENT
Start: 2018-05-29 | End: 2020-03-09

## 2018-05-29 NOTE — TELEPHONE ENCOUNTER
Can you call [facility] outpatient infusion clinic and find out the process for Ms. Salgado to receive her Prolia injection 60 mg/mL once every 6 months.  This was due in December, but I'm not sure how to go about ordering this for her.

## 2018-05-29 NOTE — PATIENT INSTRUCTIONS
Back Pain, Adult  Back pain is very common in adults. The cause of back pain is rarely dangerous and the pain often gets better over time. The cause of your back pain may not be known. Some common causes of back pain include:  · Strain of the muscles or ligaments supporting the spine.  · Wear and tear (degeneration) of the spinal disks.  · Arthritis.  · Direct injury to the back.  For many people, back pain may return. Since back pain is rarely dangerous, most people can learn to manage this condition on their own.  Follow these instructions at home:  Watch your back pain for any changes. The following actions may help to lessen any discomfort you are feeling:  · Remain active. It is stressful on your back to sit or  one place for long periods of time. Do not sit, drive, or  one place for more than 30 minutes at a time. Take short walks on even surfaces as soon as you are able. Try to increase the length of time you walk each day.  · Exercise regularly as directed by your health care provider. Exercise helps your back heal faster. It also helps avoid future injury by keeping your muscles strong and flexible.  · Do not stay in bed. Resting more than 1-2 days can delay your recovery.  · Pay attention to your body when you bend and lift. The most comfortable positions are those that put less stress on your recovering back. Always use proper lifting techniques, including:  ¨ Bending your knees.  ¨ Keeping the load close to your body.  ¨ Avoiding twisting.  · Find a comfortable position to sleep. Use a firm mattress and lie on your side with your knees slightly bent. If you lie on your back, put a pillow under your knees.  · Avoid feeling anxious or stressed. Stress increases muscle tension and can worsen back pain. It is important to recognize when you are anxious or stressed and learn ways to manage it, such as with exercise.  · Take medicines only as directed by your health care provider.  Over-the-counter medicines to reduce pain and inflammation are often the most helpful. Your health care provider may prescribe muscle relaxant drugs. These medicines help dull your pain so you can more quickly return to your normal activities and healthy exercise.  · Apply ice to the injured area:  ¨ Put ice in a plastic bag.  ¨ Place a towel between your skin and the bag.  ¨ Leave the ice on for 20 minutes, 2-3 times a day for the first 2-3 days. After that, ice and heat may be alternated to reduce pain and spasms.  · Maintain a healthy weight. Excess weight puts extra stress on your back and makes it difficult to maintain good posture.  Contact a health care provider if:  · You have pain that is not relieved with rest or medicine.  · You have increasing pain going down into the legs or buttocks.  · You have pain that does not improve in one week.  · You have night pain.  · You lose weight.  · You have a fever or chills.  Get help right away if:  · You develop new bowel or bladder control problems.  · You have unusual weakness or numbness in your arms or legs.  · You develop nausea or vomiting.  · You develop abdominal pain.  · You feel faint.  This information is not intended to replace advice given to you by your health care provider. Make sure you discuss any questions you have with your health care provider.  Document Released: 12/18/2006 Document Revised: 04/27/2017 Document Reviewed: 04/21/2015  [employer] Interactive Patient Education © 2017 [employer]

## 2018-05-30 NOTE — TELEPHONE ENCOUNTER
I have written the order if you want to get the demographic sheet and copy of the insurance card to fax to them so she can get started back on this.

## 2018-05-30 NOTE — PROGRESS NOTES
Subjective   Genesis Salgado is a 63 y.o. female.     She presents today for problems with her lower back.  Reports symptoms have been present for a few weeks now.  She does have a history of compression fractures in her lower back and osteoporosis which concerns her that she may have a bulging disc or another compression fracture.      Back Pain   This is a recurrent problem. The current episode started 1 to 4 weeks ago. The problem occurs constantly. The problem has been gradually worsening since onset. The pain is present in the lumbar spine. The quality of the pain is described as aching. The pain is moderate. The pain is the same all the time. Stiffness is present all day. Associated symptoms include paresthesias and weakness. Pertinent negatives include no abdominal pain, bladder incontinence, bowel incontinence, chest pain, dysuria, fever, headaches, leg pain, numbness, paresis, pelvic pain, perianal numbness, tingling or weight loss. Risk factors include history of osteoporosis and menopause. She has tried nothing for the symptoms. The treatment provided no relief.        The following portions of the patient's history were reviewed and updated as appropriate: allergies, current medications, past family history, past medical history, past social history, past surgical history and problem list.    Review of Systems   Constitutional: Negative for fever and unexpected weight loss.   HENT: Negative.    Eyes: Negative.    Respiratory: Negative.    Cardiovascular: Negative.  Negative for chest pain.   Gastrointestinal: Negative.  Negative for abdominal pain and bowel incontinence.   Genitourinary: Negative for urinary incontinence, dysuria and pelvic pain.   Musculoskeletal: Positive for arthralgias, back pain and gait problem.   Skin: Negative.    Allergic/Immunologic: Negative.    Neurological: Positive for weakness and paresthesias. Negative for tingling and numbness.   Hematological: Negative.     Psychiatric/Behavioral: Negative.        Objective   Physical Exam   Constitutional: She is oriented to person, place, and time. Vital signs are normal. She appears well-developed and well-nourished. No distress.   HENT:   Head: Normocephalic.   Right Ear: External ear normal.   Left Ear: External ear normal.   Nose: Nose normal.   Mouth/Throat: Oropharynx is clear and moist. No oropharyngeal exudate.   Eyes: Conjunctivae and EOM are normal. Pupils are equal, round, and reactive to light. Right eye exhibits no discharge. Left eye exhibits no discharge.   Neck: Normal range of motion. Neck supple. No tracheal deviation present. No thyromegaly present.   Cardiovascular: Normal rate, regular rhythm and normal heart sounds.  Exam reveals no gallop and no friction rub.    No murmur heard.  Pulmonary/Chest: Effort normal and breath sounds normal. No respiratory distress. She has no wheezes. She has no rales. She exhibits no tenderness.   Musculoskeletal: Normal range of motion.        Lumbar back: She exhibits pain and spasm.   Lymphadenopathy:     She has no cervical adenopathy.   Neurological: She is alert and oriented to person, place, and time.   Skin: Skin is warm and dry. Capillary refill takes less than 2 seconds. No rash noted. She is not diaphoretic. No erythema. No pallor.   Psychiatric: She has a normal mood and affect. Her behavior is normal. Judgment and thought content normal.   Nursing note and vitals reviewed.        Assessment/Plan   Genesis was seen today for back pain.    Diagnoses and all orders for this visit:    Acute bilateral low back pain without sciatica  -     XR Spine Lumbar 4+ View  -     MRI Lumbar Spine Without Contrast; Future  -     MethylPREDNISolone (MEDROL, ILYA,) 4 MG tablet; Take as directed on package instructions.  -     tiZANidine (ZANAFLEX) 4 MG tablet; Take 1 tablet by mouth Every 8 (Eight) Hours As Needed for Muscle Spasms.    History of osteoporosis    Systemic lupus  erythematosus, unspecified SLE type, unspecified organ involvement status    Vitamin D deficiency disease    Other orders  -     fexofenadine-pseudoephedrine (ALLEGRA-D 24) 180-240 MG per 24 hr tablet; Take 1 tablet by mouth Daily.  -     dexlansoprazole (DEXILANT) 60 MG capsule; Take 1 capsule by mouth Daily.    Patient's Body mass index is 26.12 kg/m². BMI is above normal parameters. Recommendations include: educational material.    X-ray following office visit.  We will anticipate the need for an MRI.  Finish all steroids.  Zanaflex PRN muscle spasm/pain.  Continue all other current medications.  Follow up as scheduled for routine follow up.  Follow up sooner for problems/concerns.  Patient verbalized understanding and agreement with plan of care.        This document has been electronically signed by TEDDY Zelaya on May 29, 2018 9:53 PM

## 2018-05-30 NOTE — TELEPHONE ENCOUNTER
Orders, office note, Demographics and insurance information faxed to Carlos at [phone] [facility].

## 2018-05-30 NOTE — PROGRESS NOTES
Osteopenia.  Slight loss of height and sclerosis of the superior aspect of the L4 vertebral body.  This may represent a subtle compression fracture of uncertain age.  We need to proceed with the MRI of the lumbar spine.  I have already got the order in.  This needs to be scheduled ASAP.

## 2018-05-30 NOTE — TELEPHONE ENCOUNTER
I spoke with Carlos he stated that he will need a Physicans order, order for the injection,her name, date of birth,Office note or H&P, diagnosis for Osteo with ICD 10 code ,Demo sheet,Insurance card.Faxed to [phone].

## 2018-06-04 ENCOUNTER — TELEPHONE (OUTPATIENT)
Dept: FAMILY MEDICINE CLINIC | Facility: CLINIC | Age: 63
End: 2018-06-04

## 2018-06-04 NOTE — TELEPHONE ENCOUNTER
----- Message from Ghazal Pastor sent at 6/4/2018  1:08 PM CDT -----  Regarding: MRI  Contact: [phone]  WANTS TO KNOW IF MRI HAS BEEN SCHEDULED

## 2018-06-05 ENCOUNTER — TELEPHONE (OUTPATIENT)
Dept: FAMILY MEDICINE CLINIC | Facility: CLINIC | Age: 63
End: 2018-06-05

## 2018-06-05 NOTE — TELEPHONE ENCOUNTER
----- Message from TEDDY Zelaya sent at 5/29/2018  7:16 PM CDT -----  Osteopenia.  Slight loss of height and sclerosis of the superior aspect of the L4 vertebral body.  This may represent a subtle compression fracture of uncertain age.  We need to proceed with the MRI of the lumbar spine.  I have already got the order   in.  This needs to be scheduled ASAP.

## 2018-06-13 ENCOUNTER — TELEPHONE (OUTPATIENT)
Dept: FAMILY MEDICINE CLINIC | Facility: CLINIC | Age: 63
End: 2018-06-13

## 2018-06-13 NOTE — TELEPHONE ENCOUNTER
Acute/early subacute compression fractures of L3/L4.  Mild to moderate multilevel degenerative disc disease.  Low-grade strain of the paraspinal musculature.  Does she need a referral back to see her specialist or is she able to make the appointment?

## 2018-06-14 DIAGNOSIS — S32.040A CLOSED COMPRESSION FRACTURE OF FOURTH LUMBAR VERTEBRA, INITIAL ENCOUNTER: ICD-10-CM

## 2018-06-14 DIAGNOSIS — S32.030A CLOSED COMPRESSION FRACTURE OF THIRD LUMBAR VERTEBRA, INITIAL ENCOUNTER: Primary | ICD-10-CM

## 2018-06-14 NOTE — TELEPHONE ENCOUNTER
I have put in a referral to Dr. Strickland at the patient's request.  Needs an appt ASAP.  Has new compression fractures of L3 and L4.  Has seen this provider in the past.

## 2018-06-14 NOTE — TELEPHONE ENCOUNTER
Pt was informed of this result and would like a refferal to Lucio Strickland in  [place] or [place] as soon as possible.

## 2018-06-20 DIAGNOSIS — M54.50 ACUTE BILATERAL LOW BACK PAIN WITHOUT SCIATICA: ICD-10-CM

## 2018-07-19 ENCOUNTER — OFFICE VISIT (OUTPATIENT)
Dept: FAMILY MEDICINE CLINIC | Facility: CLINIC | Age: 63
End: 2018-07-19

## 2018-07-19 VITALS
DIASTOLIC BLOOD PRESSURE: 72 MMHG | HEART RATE: 75 BPM | BODY MASS INDEX: 25.17 KG/M2 | TEMPERATURE: 97.8 F | OXYGEN SATURATION: 99 % | SYSTOLIC BLOOD PRESSURE: 124 MMHG | HEIGHT: 66 IN | WEIGHT: 156.6 LBS

## 2018-07-19 DIAGNOSIS — R11.2 POST-OPERATIVE NAUSEA AND VOMITING: Primary | ICD-10-CM

## 2018-07-19 DIAGNOSIS — M32.9 SYSTEMIC LUPUS ERYTHEMATOSUS, UNSPECIFIED SLE TYPE, UNSPECIFIED ORGAN INVOLVEMENT STATUS (HCC): ICD-10-CM

## 2018-07-19 DIAGNOSIS — E55.9 VITAMIN D DEFICIENCY DISEASE: ICD-10-CM

## 2018-07-19 DIAGNOSIS — M65.341 TRIGGER RING FINGER OF RIGHT HAND: ICD-10-CM

## 2018-07-19 DIAGNOSIS — Z98.890 POST-OPERATIVE NAUSEA AND VOMITING: Primary | ICD-10-CM

## 2018-07-19 DIAGNOSIS — Z87.39 HISTORY OF OSTEOPOROSIS: ICD-10-CM

## 2018-07-19 PROBLEM — S32.040A CLOSED COMPRESSION FRACTURE OF L4 LUMBAR VERTEBRA: Status: RESOLVED | Noted: 2018-06-14 | Resolved: 2018-07-19

## 2018-07-19 PROBLEM — S32.030A CLOSED COMPRESSION FRACTURE OF L3 LUMBAR VERTEBRA: Status: RESOLVED | Noted: 2018-06-14 | Resolved: 2018-07-19

## 2018-07-19 PROCEDURE — 99214 OFFICE O/P EST MOD 30 MIN: CPT | Performed by: NURSE PRACTITIONER

## 2018-07-19 RX ORDER — SCOLOPAMINE TRANSDERMAL SYSTEM 1 MG/1
1 PATCH, EXTENDED RELEASE TRANSDERMAL
Qty: 10 EACH | Refills: 0 | Status: SHIPPED | OUTPATIENT
Start: 2018-07-19 | End: 2020-03-17

## 2018-08-01 ENCOUNTER — HOSPITAL ENCOUNTER (OUTPATIENT)
Dept: PHYSICAL THERAPY | Facility: HOSPITAL | Age: 63
Setting detail: THERAPIES SERIES
Discharge: HOME | End: 2018-08-01

## 2018-08-01 DIAGNOSIS — M65.341 TRIGGER RING FINGER OF RIGHT HAND: Primary | ICD-10-CM

## 2018-08-01 PROCEDURE — G8984 CARRY CURRENT STATUS: HCPCS | Performed by: PHYSICAL THERAPIST

## 2018-08-01 PROCEDURE — 97162 PT EVAL MOD COMPLEX 30 MIN: CPT | Performed by: PHYSICAL THERAPIST

## 2018-08-01 PROCEDURE — 97110 THERAPEUTIC EXERCISES: CPT | Performed by: PHYSICAL THERAPIST

## 2018-08-01 PROCEDURE — G8985 CARRY GOAL STATUS: HCPCS | Performed by: PHYSICAL THERAPIST

## 2018-08-02 ENCOUNTER — APPOINTMENT (OUTPATIENT)
Dept: PHYSICAL THERAPY | Facility: HOSPITAL | Age: 63
End: 2018-08-02

## 2018-08-05 NOTE — PROGRESS NOTES
Subjective   Genesis Salgado is a 63 y.o. female.     She presents today for her routine follow up on chronic medical problems.  She is still seeing her rheumatologist for management of her lupus.  She recently had her L3/L4 compression fracture repaired two days ago.  Since her surgery she has really been struggling with post op nausea and vomiting.  In the past they have used a scopolamine patch on her, but she was not given this this time and would like an RX for this today if possible.  She reports that her back pain is significantly better since her surgery.  She still has not heard from [facility] regarding her Prolia injection that was due in December.  She would like this refaxed today in the office if possible.      Lupus   This is a chronic problem. The current episode started more than 1 year ago. The problem occurs constantly. The problem has been waxing and waning. Associated symptoms include arthralgias, joint swelling, myalgias and nausea. Pertinent negatives include no abdominal pain, anorexia, change in bowel habit, chest pain, chills, congestion, coughing, diaphoresis, fatigue, fever, headaches, neck pain, numbness, rash, sore throat, swollen glands, urinary symptoms, vertigo, visual change, vomiting or weakness. The treatment provided significant relief.   Back Pain   This is a chronic problem. The current episode started more than 1 year ago. The problem occurs intermittently. The problem has been waxing and waning since onset. The pain is present in the lumbar spine. The pain is at a severity of 2/10. The pain is mild. The pain is the same all the time. Pertinent negatives include no abdominal pain, bladder incontinence, bowel incontinence, chest pain, dysuria, fever, headaches, leg pain, numbness, paresis, paresthesias, pelvic pain, perianal numbness, tingling, weakness or weight loss. Risk factors include history of osteoporosis, history of steroid use and menopause. The treatment provided  significant relief.   Nausea   This is a new problem. The current episode started in the past 7 days. The problem occurs intermittently. The problem has been waxing and waning. Associated symptoms include arthralgias, joint swelling, myalgias and nausea. Pertinent negatives include no abdominal pain, anorexia, change in bowel habit, chest pain, chills, congestion, coughing, diaphoresis, fatigue, fever, headaches, neck pain, numbness, rash, sore throat, swollen glands, urinary symptoms, vertigo, visual change, vomiting or weakness. The treatment provided moderate relief.        The following portions of the patient's history were reviewed and updated as appropriate: allergies, current medications, past family history, past medical history, past social history, past surgical history and problem list.    Review of Systems   Constitutional: Negative.  Negative for chills, diaphoresis, fatigue, fever and unexpected weight loss.   HENT: Negative.  Negative for congestion and sore throat.    Eyes: Negative.    Respiratory: Negative.  Negative for cough.    Cardiovascular: Negative.  Negative for chest pain.   Gastrointestinal: Positive for nausea. Negative for abdominal pain, anorexia, bowel incontinence, change in bowel habit and vomiting.   Genitourinary: Negative for urinary incontinence, dysuria and pelvic pain.   Musculoskeletal: Positive for arthralgias, back pain, joint swelling and myalgias. Negative for neck pain.   Skin: Negative.  Negative for rash.   Allergic/Immunologic: Negative.    Neurological: Negative.  Negative for vertigo, tingling, weakness, numbness and paresthesias.   Hematological: Negative.    Psychiatric/Behavioral: Negative.        Objective   Physical Exam   Constitutional: She is oriented to person, place, and time. Vital signs are normal. She appears well-developed and well-nourished. No distress.   HENT:   Head: Normocephalic.   Right Ear: External ear normal.   Left Ear: External ear normal.    Nose: Nose normal.   Mouth/Throat: Oropharynx is clear and moist. No oropharyngeal exudate.   Eyes: Pupils are equal, round, and reactive to light. Conjunctivae and EOM are normal. Right eye exhibits no discharge. Left eye exhibits no discharge.   Neck: Normal range of motion. Neck supple. No tracheal deviation present. No thyromegaly present.   Cardiovascular: Normal rate, regular rhythm and normal heart sounds.  Exam reveals no gallop and no friction rub.    No murmur heard.  Pulmonary/Chest: Effort normal and breath sounds normal. No respiratory distress. She has no wheezes. She has no rales. She exhibits no tenderness.   Musculoskeletal: Normal range of motion.        Lumbar back: She exhibits tenderness (recent surgical repair of compression fx in L3/L4).   Hx of trigger finger repair in the right hand.  Still lacks some ROM.   Lymphadenopathy:     She has no cervical adenopathy.   Neurological: She is alert and oriented to person, place, and time.   Skin: Skin is warm and dry. Capillary refill takes less than 2 seconds. No rash noted. She is not diaphoretic. No erythema. No pallor.   Psychiatric: She has a normal mood and affect. Her behavior is normal. Judgment and thought content normal.   Nursing note and vitals reviewed.        Assessment/Plan   Genesis was seen today for lupus.    Diagnoses and all orders for this visit:    Post-operative nausea and vomiting  -     Scopolamine (TRANSDERM-SCOP, 1.5 MG,) 1.5 MG/3DAYS patch; Place 1 patch on the skin Every 72 (Seventy-Two) Hours.    Systemic lupus erythematosus, unspecified SLE type, unspecified organ involvement status (CMS/[facility])    Vitamin D deficiency disease    History of osteoporosis    Trigger ring finger of right hand  -     Ambulatory Referral to Physical Therapy Evaluate and treat    Patient's Body mass index is 25.28 kg/m². BMI is within normal parameters. No follow-up required.  Scopolamine patches PRN post op nausea and vomiting.  Referral to PT  regarding the trigger finger on the right hand.  We will refax her order for her Prolia injection to [facility] where she was previously receiving this.  Continue all other current medications.  Follow up in 6 months for routine follow up.  Follow up sooner for problems/concerns.  Patient verbalized understanding and agreement with plan of care.        This document has been electronically signed by TEDDY Zelaya on August 4, 2018 9:45 PM

## 2018-08-06 ENCOUNTER — APPOINTMENT (OUTPATIENT)
Dept: PHYSICAL THERAPY | Facility: HOSPITAL | Age: 63
End: 2018-08-06

## 2018-08-08 ENCOUNTER — HOSPITAL ENCOUNTER (OUTPATIENT)
Dept: PHYSICAL THERAPY | Facility: HOSPITAL | Age: 63
Setting detail: THERAPIES SERIES
Discharge: HOME | End: 2018-08-08

## 2018-08-08 DIAGNOSIS — M65.341 TRIGGER RING FINGER OF RIGHT HAND: Primary | ICD-10-CM

## 2018-08-08 PROCEDURE — 97110 THERAPEUTIC EXERCISES: CPT

## 2018-08-08 NOTE — THERAPY TREATMENT NOTE
Outpatient Physical Therapy Ortho Treatment Note  [facility]     Patient Name: Genesis Salgado  : 1955  MRN: [id number]  Today's Date: 2018      Visit Date: 2018  Pt reports 0/10 pain pre treatment, 0/10 pain post treatment  Reports n/a% of improvement.  Attended 3/4visits.  Insurance available: 9 visits  Next MD appt: [id number].  Recertification: 2018.  Visit Dx:    ICD-10-CM ICD-9-CM   1. Trigger ring finger of right hand M65.341 727.03       Patient Active Problem List   Diagnosis   • Systemic lupus erythematosus (CMS/HCC)   • Vitamin D deficiency disease   • History of osteoporosis        Past Medical History:   Diagnosis Date   • Adie's syndrome    • Cancer (CMS/HCC)     colon cancer   • Colon cancer (CMS/HCC)    • Hiatal hernia    • Lupus    • Osteoporosis    • Ulcerative colitis (CMS/HCC)         Past Surgical History:   Procedure Laterality Date   • BACK SURGERY     • BILATERAL BREAST REDUCTION     • CATARACT EXTRACTION W/ INTRAOCULAR LENS IMPLANT     • CHOLECYSTECTOMY     • COLON RESECTION     • COLON SURGERY     • FIXATION KYPHOPLASTY THORACIC SPINE      T12   • LUMBAR DISCECTOMY     • TRIGGER FINGER RELEASE Right              PT Ortho     Row Name 18 1500       Precautions and Contraindications    Precautions LATEX ALLERGY  -TL    Row Name 18 1300       Precautions and Contraindications    Precautions LATEX ALLERGY  -TL       Subjective Pain    Able to rate subjective pain? yes  -TL        Strength Right    # Reps 2  -TL    Right Rung 2  -TL    Right  Test 1 40  -TL    Right  Test 2 40  -TL    Right  Test 3 44  -TL     Strength Average Right 41.33  -TL      User Key  (r) = Recorded By, (t) = Taken By, (c) = Cosigned By    Initials Name Provider Type    Daksha Dobbs, PTA Physical Therapy Assistant                            PT Assessment/Plan     Row Name 18 1700          PT Assessment    Assessment Comments  Discussed with pt that she will have to be d/c before she starts rehab on her back . pt verbalized understanding. Pt stated that her doctor wanted her to go to Rehab in [place]. Pt tolerated table top finger ext. pt ring finger lacks ext but working toward goal. Pt reports that she has been doing her Putty ex at home. Pt's  strength has improved from last test.  -TL        PT Plan    PT Frequency 2x/week  -TL     PT Plan Comments add parafin next visit.  -TL       User Key  (r) = Recorded By, (t) = Taken By, (c) = Cosigned By    Initials Name Provider Type    Daksha Dobbs PTA Physical Therapy Assistant                Modalities     Row Name 08/08/18 1700 08/08/18 1500          Subjective Comments    Subjective Comments  -- pt stated that she will start therapy next week in [place]  -TL        Subjective Pain    Able to rate subjective pain?  -- yes  -TL     Pre-Treatment Pain Level  -- 0  -TL     Post-Treatment Pain Level  -- 0  -TL        Moist Heat    MH Applied Yes  -TL  --     Location right hand  -TL  --     Rx Minutes 15 mins  -TL  --     MH S/P Rx Yes  -TL  --       User Key  (r) = Recorded By, (t) = Taken By, (c) = Cosigned By    Initials Name Provider Type    Daksha Dobbs PTA Physical Therapy Assistant                Exercises     Row Name 08/08/18 1700 08/08/18 1500 08/08/18 1300       Subjective Comments    Subjective Comments  -- pt stated that she will start therapy next week in [place]  -TL  --       Subjective Pain    Able to rate subjective pain?  -- yes  -TL yes  -TL    Pre-Treatment Pain Level  -- 0  -TL  --    Post-Treatment Pain Level  -- 0  -TL  --       Total Minutes    40222 - PT Manual Therapy Minutes 8  -TL  --  --       Exercise 1    Exercise Name 1  --  -- Pro II UE F/R  -TL    Time 1  --  -- 10 minutes  -TL    Additional Comments  --  -- pt brought pad for back support with back brace donned.  -TL       Exercise 2    Exercise Name 2  --  -- R CT 1 S  -TL    Reps 2  --  -- 2  -TL     Time 2  --  -- 1 min  -TL    Additional Comments  --  -- holding onto fingers  -TL       Exercise 3    Exercise Name 3  --  -- R CT 1 RF S  -TL    Reps 3  --  -- 2  -TL    Time 3  --  -- 1 mins  -TL       Exercise 4    Exercise Name 4  --  -- finger ext table top  -TL    Sets 4  --  -- 2  -TL    Reps 4  --  -- 10  -TL       Exercise 5    Exercise Name 5  --  -- In and out fingers  -TL    Reps 5  --  -- 20  -TL       Exercise 6    Exercise Name 6  --  -- claw hand  -TL    Reps 6  --  -- 20  -TL      User Key  (r) = Recorded By, (t) = Taken By, (c) = Cosigned By    Initials Name Provider Type    Daksha Dobbs PTA Physical Therapy Assistant                        Manual Rx (last 36 hours)      Manual Treatments     Row Name 08/08/18 1700             Total Minutes    03801 - PT Manual Therapy Minutes 8  -TL         Manual Rx 1    Manual Rx 1 Location ring finger/palm right   -TL      Manual Rx 1 Type scar massage/tissue lengthening  -TL        User Key  (r) = Recorded By, (t) = Taken By, (c) = Cosigned By    Initials Name Provider Type    Daksha Dobbs PTA Physical Therapy Assistant                PT OP Goals     Row Name 08/08/18 1300          PT Short Term Goals    STG Date to Achieve 08/22/18  -TL     STG 1 I with HEP and have additins/changes by next rcertification.  -TL     STG 1 Progress Ongoing  -TL     STG 2 AROM R wrist extension >= 65°.  -TL     STG 2 Progress Ongoing  -TL     STG 3 AROm R MF MCP extension >= 10°.  -TL     STG 3 Progress Ongoing  -TL     STG 4 R  @ #2 setting >= 40# on 2 attempt average.  -TL     STG 4 Progress Met  -TL     STG 5 R finger intrinsics >= 4+/5.  -TL        Long Term Goals    LTG Date to Achieve 09/07/18  -TL     LTG 1 AROm R wrist ext >= 70°.  -TL     LTG 2 AROm All R hand MCP extension >= 20°.  -TL     LTG 3 R  @ #2 setting >= 45# on 2 attempt average.  -TL     LTG 4 R finger intrinsics >= 5/5.  -TL     LTG 5 I wit final HEP.  -TL     LTG 6 D/C with a  final HEp and free 30 day fitness formula membership.  -TL        Time Calculation    PT Goal Re-Cert Due Date 08/22/18  -TL       User Key  (r) = Recorded By, (t) = Taken By, (c) = Cosigned By    Initials Name Provider Type    Daksha Dobbs PTA Physical Therapy Assistant          Therapy Education  Education Details: claw hand  Given: HEP, Symptoms/condition management, Pain management  Program: New  How Provided: Verbal, Demonstration, Written  Provided to: Patient  Level of Understanding: Verbalized, Demonstrated              Time Calculation:   Start Time: 1300  Stop Time: 1400  Time Calculation (min): 60 min  PT Non-Billable Time (min): 15 min  Total Timed Code Minutes- PT: 45 minute(s)  Therapy Suggested Charges     Code   Minutes Charges    43205 (CPT®) Hc Pt Neuromusc Re Education Ea 15 Min      26767 (CPT®) Hc Pt Ther Proc Ea 15 Min      10546 (CPT®) Hc Gait Training Ea 15 Min      19906 (CPT®) Hc Pt Therapeutic Act Ea 15 Min      83907 (CPT®) Hc Pt Manual Therapy Ea 15 Min 8 1    72876 (CPT®) Hc Pt Ther Massage- Per 15 Min      65114 (CPT®) Hc Pt Iontophoresis Ea 15 Min      18890 (CPT®) Hc Pt Elec Stim Ea-Per 15 Min      85399 (CPT®) Hc Pt Ultrasound Ea 15 Min      79314 (CPT®) Hc Pt Self Care/Mgmt/Train Ea 15 Min      Total  8 1        Therapy Charges for Today     Code Description Service Date Service Provider Modifiers Qty    20596328564 HC PT THER PROC EA 15 MIN 8/8/2018 Daksha Evans PTA GP 3    98559768270 HC PT THER SUPP EA 15 MIN 8/8/2018 Daksha Evans PTA GP 1                    Daksha Evans PTA  8/8/2018

## 2018-08-09 ENCOUNTER — APPOINTMENT (OUTPATIENT)
Dept: PHYSICAL THERAPY | Facility: HOSPITAL | Age: 63
End: 2018-08-09

## 2018-08-10 ENCOUNTER — HOSPITAL ENCOUNTER (OUTPATIENT)
Dept: PHYSICAL THERAPY | Facility: HOSPITAL | Age: 63
Setting detail: THERAPIES SERIES
Discharge: HOME | End: 2018-08-10

## 2018-08-10 DIAGNOSIS — M65.341 TRIGGER RING FINGER OF RIGHT HAND: Primary | ICD-10-CM

## 2018-08-10 PROCEDURE — 97018 PARAFFIN BATH THERAPY: CPT

## 2018-08-10 PROCEDURE — 97110 THERAPEUTIC EXERCISES: CPT

## 2018-08-10 NOTE — THERAPY TREATMENT NOTE
Outpatient Physical Therapy Ortho Treatment Note  [facility]     Patient Name: Genesis Salgado  : 1955  MRN: [id number]  Today's Date: 8/10/2018      Visit Date: 08/10/2018  Pt reports 0/10 pain pre treatment, 0/10 pain post treatment  Reports some% of improvement.  Attended 4/5 visits.  Insurance available. 9visits   Next MD appt: [id number] .  Recertification: 2018.  Visit Dx:    ICD-10-CM ICD-9-CM   1. Trigger ring finger of right hand M65.341 727.03       Patient Active Problem List   Diagnosis   • Systemic lupus erythematosus (CMS/HCC)   • Vitamin D deficiency disease   • History of osteoporosis        Past Medical History:   Diagnosis Date   • Adie's syndrome    • Cancer (CMS/HCC)     colon cancer   • Colon cancer (CMS/HCC)    • Hiatal hernia    • Lupus    • Osteoporosis    • Ulcerative colitis (CMS/HCC)         Past Surgical History:   Procedure Laterality Date   • BACK SURGERY     • BILATERAL BREAST REDUCTION     • CATARACT EXTRACTION W/ INTRAOCULAR LENS IMPLANT     • CHOLECYSTECTOMY     • COLON RESECTION     • COLON SURGERY     • FIXATION KYPHOPLASTY THORACIC SPINE      T12   • LUMBAR DISCECTOMY     • TRIGGER FINGER RELEASE Right              PT Ortho     Row Name 08/10/18 0900       Subjective Comments    Subjective Comments Pt stated that she wants to do therapy next week before back rehab starts  -TL       Precautions and Contraindications    Precautions LATEX ALLERGY  -TL       Subjective Pain    Able to rate subjective pain? yes  -TL    Pre-Treatment Pain Level 0  -TL    Row Name 18 1500       Precautions and Contraindications    Precautions LATEX ALLERGY  -TL    Row Name 18 1300       Precautions and Contraindications    Precautions LATEX ALLERGY  -TL       Subjective Pain    Able to rate subjective pain? yes  -TL        Strength Right    # Reps 2  -TL    Right Rung 2  -TL    Right  Test 1 40  -TL    Right  Test 2 40  -TL    Right  Test  3 44  -TL     Strength Average Right 41.33  -TL      User Key  (r) = Recorded By, (t) = Taken By, (c) = Cosigned By    Initials Name Provider Type    Daksha Dobbs PTA Physical Therapy Assistant                            PT Assessment/Plan     Row Name 08/10/18 1000          PT Assessment    Assessment Comments pt is improving with ROM and strength with ring finger ext and flexion. Pt performing exercises better this date. Pt working toward goals.  -TL        PT Plan    PT Frequency 2x/week  -TL     PT Plan Comments measure ROM next visit  -TL       User Key  (r) = Recorded By, (t) = Taken By, (c) = Cosigned By    Initials Name Provider Type    Daksha Dobbs PTA Physical Therapy Assistant                Modalities     Row Name 08/10/18 0900             Parrafin    Paraffin 21429 Location hand right  -TL      Vasopneumatic 18885 Location --   8  dips  -TL      Rx Minutes 10mins  -TL        User Key  (r) = Recorded By, (t) = Taken By, (c) = Cosigned By    Initials Name Provider Type    Daksha Dobbs PTA Physical Therapy Assistant                Exercises     Row Name 08/10/18 0900             Subjective Comments    Subjective Comments Pt stated that she wants to do therapy next week before back rehab starts  -TL         Subjective Pain    Able to rate subjective pain? yes  -TL      Pre-Treatment Pain Level 0  -TL      Post-Treatment Pain Level 0  -TL         Exercise 1    Exercise Name 1 Pro II UE F/R  -TL      Time 1 10 minutes  -TL      Additional Comments level 3  -TL         Exercise 2    Exercise Name 2 R CT 1 S  -TL      Reps 2 2  -TL      Time 2 1 min  -TL         Exercise 3    Exercise Name 3 R CT 1 RF S  -TL      Reps 3 2  -TL      Time 3 1 min  -TL         Exercise 4    Exercise Name 4 table tops  -TL      Reps 4 20  -TL         Exercise 5    Exercise Name 5 table top finger ext  -TL      Reps 5 20  -TL         Exercise 6    Exercise Name 6 claw hand  -TL      Reps 6 20  -TL          Exercise 7    Exercise Name 7 fisting with R putty  -TL      Time 7 5 mins  -TL         Exercise 8    Exercise Name 8 stars yellow putty  -TL      Time 8 3 mins  -TL         Exercise 9    Exercise Name 9 R finger ext  -TL      Reps 9 20  -TL        User Key  (r) = Recorded By, (t) = Taken By, (c) = Cosigned By    Initials Name Provider Type    Daksha Dobbs PTA Physical Therapy Assistant                               PT OP Goals     Row Name 08/10/18 0900          PT Short Term Goals    STG Date to Achieve 08/22/18  -TL     STG 1 I with HEP and have additins/changes by next rcertification.  -TL     STG 1 Progress Ongoing  -TL     STG 2 AROM R wrist extension >= 65°.  -TL     STG 2 Progress Ongoing  -TL     STG 3 AROm R MF MCP extension >= 10°.  -TL     STG 3 Progress Ongoing  -TL     STG 4 R  @ #2 setting >= 40# on 2 attempt average.  -TL     STG 4 Progress Met  -TL     STG 5 R finger intrinsics >= 4+/5.  -TL     STG 5 Progress Ongoing  -TL        Long Term Goals    LTG Date to Achieve 09/07/18  -TL     LTG 1 AROm R wrist ext >= 70°.  -TL     LTG 2 AROm All R hand MCP extension >= 20°.  -TL     LTG 3 R  @ #2 setting >= 45# on 2 attempt average.  -TL     LTG 4 R finger intrinsics >= 5/5.  -TL     LTG 5 I wit final HEP.  -TL     LTG 6 D/C with a final HEp and free 30 day fitness formula membership.  -TL        Time Calculation    PT Goal Re-Cert Due Date 08/22/18  -TL       User Key  (r) = Recorded By, (t) = Taken By, (c) = Cosigned By    Initials Name Provider Type    Daksha Dobbs PTA Physical Therapy Assistant          Therapy Education  Education Details: finger ext with yellow putty #5 (scar massage)  Given: HEP, Symptoms/condition management  Program: New  How Provided: Verbal, Demonstration, Written  Provided to: Patient  Level of Understanding: Verbalized, Demonstrated              Time Calculation:   Start Time: 0933  Stop Time: 1029  Time Calculation (min): 56 min  Total Timed Code  Minutes- PT: 46 minute(s)  Therapy Suggested Charges     Code   Minutes Charges    None           Therapy Charges for Today     Code Description Service Date Service Provider Modifiers Qty    50156324244 HC PT THER PROC EA 15 MIN 8/10/2018 Daksha Evans, PTA GP 3    29276095738 HC PT PARAFFIN BATH 8/10/2018 Daksha Evans, PTA GP 1                    Daksha Evans PTA  8/10/2018

## 2018-08-13 ENCOUNTER — APPOINTMENT (OUTPATIENT)
Dept: PHYSICAL THERAPY | Facility: HOSPITAL | Age: 63
End: 2018-08-13

## 2018-08-14 ENCOUNTER — APPOINTMENT (OUTPATIENT)
Dept: PHYSICAL THERAPY | Facility: HOSPITAL | Age: 63
End: 2018-08-14

## 2018-08-14 ENCOUNTER — HOSPITAL ENCOUNTER (OUTPATIENT)
Dept: PHYSICAL THERAPY | Facility: HOSPITAL | Age: 63
Setting detail: THERAPIES SERIES
Discharge: HOME | End: 2018-08-14

## 2018-08-14 DIAGNOSIS — M65.341 TRIGGER RING FINGER OF RIGHT HAND: Primary | ICD-10-CM

## 2018-08-14 PROCEDURE — 97018 PARAFFIN BATH THERAPY: CPT

## 2018-08-14 PROCEDURE — 97110 THERAPEUTIC EXERCISES: CPT

## 2018-08-14 NOTE — THERAPY TREATMENT NOTE
Outpatient Physical Therapy Ortho Treatment Note  [facility]     Patient Name: Genesis Salgado  : 1955  MRN: [id number]  Today's Date: 2018      Visit Date: 2018  Visits . Recert . MD f/u ANTWAN. Some % improvement.   Visit Dx:    ICD-10-CM ICD-9-CM   1. Trigger ring finger of right hand M65.341 727.03       Patient Active Problem List   Diagnosis   • Systemic lupus erythematosus (CMS/HCC)   • Vitamin D deficiency disease   • History of osteoporosis        Past Medical History:   Diagnosis Date   • Adie's syndrome    • Cancer (CMS/HCC)     colon cancer   • Colon cancer (CMS/HCC)    • Hiatal hernia    • Lupus    • Osteoporosis    • Ulcerative colitis (CMS/HCC)         Past Surgical History:   Procedure Laterality Date   • BACK SURGERY     • BILATERAL BREAST REDUCTION     • CATARACT EXTRACTION W/ INTRAOCULAR LENS IMPLANT     • CHOLECYSTECTOMY     • COLON RESECTION     • COLON SURGERY     • FIXATION KYPHOPLASTY THORACIC SPINE      T12   • LUMBAR DISCECTOMY     • TRIGGER FINGER RELEASE Right                              PT Assessment/Plan     Row Name 18 0900          PT Assessment    Assessment Comments Good emanuel of today's treatment. She reported greater movement after doing  parrafin first. She is compliant with HEP.   -        PT Plan    PT Frequency 2x/week  -     PT Plan Comments Cont PT per POC.   -       User Key  (r) = Recorded By, (t) = Taken By, (c) = Cosigned By    Initials Name Provider Type    Sadiq Mott PTA Physical Therapy Assistant                Modalities     Row Name 18 0900             Parrafin    [place] [id number] Location R hand  -      Rx Minutes 8  -        User Key  (r) = Recorded By, (t) = Taken By, (c) = Cosigned By    Initials Name Provider Type    Sadiq Mott PTA Physical Therapy Assistant                Exercises     Row Name 18 0900             Subjective Comments    Subjective Comments Pt reports  some finger aggravation from using hand during yard work.   -         Subjective Pain    Able to rate subjective pain? yes  -      Pre-Treatment Pain Level 0  -         Exercise 1    Exercise Name 1 CT-1  -JW      Time 1 1'  -JW         Exercise 2    Exercise Name 2 Ring finger individual ext S  -JW      Time 2 1'  -JW         Exercise 3    Exercise Name 3 Palm flat finger ext  -JW      Reps 3 20  -JW         Exercise 4    Exercise Name 4 Putty: finger ext  -      Time 4 3'  -JW      Additional Comments yellow  -JW         Exercise 5    Exercise Name 5 Claw  -JW      Reps 5 20  -JW         Exercise 6    Exercise Name 6 Putty,   -JW      Additional Comments red  -JW         Exercise 7    Exercise Name 7 Velcro bd: wrist flex/ext  -JW      Time 7 2'  -JW        User Key  (r) = Recorded By, (t) = Taken By, (c) = Cosigned By    Initials Name Provider Type    Sadiq Mott, ALEXIS Physical Therapy Assistant                               PT OP Goals     Row Name 08/14/18 1029 08/14/18 0900       PT Short Term Goals    STG Date to Achieve  -- 08/22/18  -    STG 1  -- I with HEP and have additins/changes by next rcertification.  -    STG 1 Progress  -- Ongoing  -    STG 2  -- AROM R wrist extension >= 65°.  -    STG 2 Progress  -- Ongoing  -    STG 3  -- AROm R MF MCP extension >= 10°.  -    STG 3 Progress  -- Ongoing  -    STG 4  -- R  @ #2 setting >= 40# on 2 attempt average.  -    STG 4 Progress  -- Met  -    STG 5  -- R finger intrinsics >= 4+/5.  -    STG 5 Progress  -- Ongoing  -       Long Term Goals    LTG Date to Achieve  -- 09/07/18  -    LTG 1  -- AROm R wrist ext >= 70°.  -    LTG 2  -- AROm All R hand MCP extension >= 20°.  -    LTG 3  -- R  @ #2 setting >= 45# on 2 attempt average.  -    LTG 4  -- R finger intrinsics >= 5/5.  -    LTG 5  -- I wit final HEP.  -    LTG 6  -- D/C with a final HEp and free 30 day fitness formula membership.  -       Time  Calculation    PT Goal Re-Cert Due Date 08/23/18  -  --      User Key  (r) = Recorded By, (t) = Taken By, (c) = Cosigned By    Initials Name Provider Type    Sadiq Mott PTA Physical Therapy Assistant                         Time Calculation:   Start Time: 0934  Stop Time: 1019  Time Calculation (min): 45 min  Total Timed Code Minutes- PT: 45 minute(s)  Therapy Suggested Charges     Code   Minutes Charges    None           Therapy Charges for Today     Code Description Service Date Service Provider Modifiers Qty    73994099630 HC PT THER PROC EA 15 MIN 8/14/2018 Sadiq Delgado PTA GP 2    24775013690 HC PT PARAFFIN BATH 8/14/2018 Sadiq Delgado PTA GP 1                    Sadiq Delgado PTA  8/14/2018

## 2018-08-16 ENCOUNTER — HOSPITAL ENCOUNTER (OUTPATIENT)
Dept: PHYSICAL THERAPY | Facility: HOSPITAL | Age: 63
Setting detail: THERAPIES SERIES
Discharge: HOME | End: 2018-08-16

## 2018-08-16 DIAGNOSIS — M65.341 TRIGGER RING FINGER OF RIGHT HAND: Primary | ICD-10-CM

## 2018-08-16 PROCEDURE — 97018 PARAFFIN BATH THERAPY: CPT

## 2018-08-16 PROCEDURE — 97110 THERAPEUTIC EXERCISES: CPT

## 2018-08-16 NOTE — THERAPY DISCHARGE NOTE
Outpatient Physical Therapy Ortho Treatment Note/Discharge Summary  [facility]     Patient Name: Genesis Salgado  : 1955  MRN: [id number]  Today's Date: 2018      Visit Date: 2018  Pt reports 0/10 pain pre treatment, 0/10 pain post treatment  Reports 50% of improvement.  Attended 6/8 visits.  Insurance available:Medicare guidelines   Next MD appt: TBMICHAEL .  Recertification: D/c  Visit Dx:    ICD-10-CM ICD-9-CM   1. Trigger ring finger of right hand M65.341 727.03       Patient Active Problem List   Diagnosis   • Systemic lupus erythematosus (CMS/HCC)   • Vitamin D deficiency disease   • History of osteoporosis        Past Medical History:   Diagnosis Date   • Adie's syndrome    • Cancer (CMS/HCC)     colon cancer   • Colon cancer (CMS/HCC)    • Hiatal hernia    • Lupus    • Osteoporosis    • Ulcerative colitis (CMS/HCC)         Past Surgical History:   Procedure Laterality Date   • BACK SURGERY     • BILATERAL BREAST REDUCTION     • CATARACT EXTRACTION W/ INTRAOCULAR LENS IMPLANT     • CHOLECYSTECTOMY     • COLON RESECTION     • COLON SURGERY     • FIXATION KYPHOPLASTY THORACIC SPINE      T12   • LUMBAR DISCECTOMY     • TRIGGER FINGER RELEASE Right              PT Ortho     Row Name 18 1100       Subjective Comments    Subjective Comments pt aware of d/c secondary to start back rehab tomorrow.  -TL       Precautions and Contraindications    Precautions LATEX ALLERGY  -TL       Subjective Pain    Able to rate subjective pain? yes  -TL    Pre-Treatment Pain Level 0  -TL        Strength Right    # Reps 3  -TL    Right Rung 2  -TL    Right  Test 1 47  -TL    Right  Test 2 55  -TL    Right  Test 3 55  -TL     Strength Average Right 52.33  -TL      User Key  (r) = Recorded By, (t) = Taken By, (c) = Cosigned By    Initials Name Provider Type    Daksha Dobbs, PTA Physical Therapy Assistant                            PT Assessment/Plan     Row  Name 08/16/18 1100          PT Assessment    Assessment Comments Pt has met all short term goals and LTGs 1,3,4,5. Pt still has decrease strength with RF intrinsics ext strength and ROM. Pt is stopping therapy secondary to had back surgery and will be having therapy on back starting tomorrow. pt will have to be d.c this date. Pt plans to come back to complete hand theraypy after back is complete.  -TL        PT Plan    PT Plan Comments d/c  -TL       User Key  (r) = Recorded By, (t) = Taken By, (c) = Cosigned By    Initials Name Provider Type    Daksha Dobbs PTA Physical Therapy Assistant                Modalities     Row Name 08/16/18 1100             Subjective Pain    Post-Treatment Pain Level 0  -TL        User Key  (r) = Recorded By, (t) = Taken By, (c) = Cosigned By    Initials Name Provider Type    Daksha Dobbs PTA Physical Therapy Assistant                Exercises     Row Name 08/16/18 1100             Subjective Comments    Subjective Comments pt aware of d/c secondary to start back rehab tomorrow.  -TL         Subjective Pain    Able to rate subjective pain? yes  -TL      Pre-Treatment Pain Level 0  -TL      Post-Treatment Pain Level 0  -TL         Exercise 1    Exercise Name 1 Pro ll UE F/R  -TL      Time 1 10 mins  -TL      Additional Comments level 3  -TL         Exercise 2    Exercise Name 2 CT-1  -TL      Reps 2 2  -TL      Time 2 1 min  -TL         Exercise 3    Exercise Name 3 R CT 1 RF S  -TL      Reps 3 2  -TL      Time 3 1 min  -TL         Exercise 4    Exercise Name 4 table tops  -TL      Reps 4 20  -TL      Additional Comments on table with ext  -TL         Exercise 5    Exercise Name 5 finger ext table top  -TL      Reps 5 20  -TL         Exercise 6    Exercise Name 6 claw hand  -TL      Reps 6 20  -TL        User Key  (r) = Recorded By, (t) = Taken By, (c) = Cosigned By    Initials Name Provider Type    Daksha Dobbs PTA Physical Therapy Assistant                                PT OP Goals     Row Name 08/16/18 1106 08/16/18 1100       PT Short Term Goals    STG Date to Achieve 08/22/18  -TL  --    STG 1 I with HEP and have additins/changes by next rcertification.  -TL  --    STG 1 Progress Met  -TL  --    STG 2 AROM R wrist extension >= 65°.  -TL  --    STG 2 Progress Met  -TL  --    STG 3 AROm R MF MCP extension >= 10°.  -TL  --    STG 3 Progress Met  -TL  --    STG 4 R  @ #2 setting >= 40# on 2 attempt average.  -TL  --    STG 4 Progress Met  -TL  --    STG 5 R finger intrinsics >= 4+/5.  -TL  --    STG 5 Progress Met  -TL  --       Long Term Goals    LTG Date to Achieve 09/07/18  -TL  --    LTG 1 AROm R wrist ext >= 70°.  -TL  --    LTG 1 Progress Met  -TL  --    LTG 2 AROm All R hand MCP extension >= 20°.  -TL  --    LTG 2 Progress Not Met  -TL  --    LTG 3 R  @ #2 setting >= 45# on 2 attempt average.  -TL  --    LTG 3 Progress Met  -TL  --    LTG 4 R finger intrinsics >= 5/5.  -TL  --    LTG 4 Progress Not Met  -TL  --    LTG 5 I wit final HEP.  -TL  --    LTG 5 Progress Met  -TL  --    LTG 6 D/C with a final HEp and free 30 day fitness formula membership.  -TL  --    LTG 6 Progress Met  -TL  --       Time Calculation    PT Goal Re-Cert Due Date 08/23/18  -TL 08/23/18  -TL      User Key  (r) = Recorded By, (t) = Taken By, (c) = Cosigned By    Initials Name Provider Type    TL Daksha Evans PTA Physical Therapy Assistant                         Time Calculation:   Start Time: 1106  Stop Time: 1200  Time Calculation (min): 54 min  Total Timed Code Minutes- PT: 44 minute(s)  Therapy Suggested Charges     Code   Minutes Charges    None           Therapy Charges for Today     Code Description Service Date Service Provider Modifiers Qty    08714337167 HC PT THER PROC EA 15 MIN 8/16/2018 Daksha Evans PTA GP 3    99591975534 HC PT PARAFFIN BATH 8/16/2018 Lantrip, Daksha J, PTA GP 1                OP PT Discharge Summary  Date of Discharge: 08/16/18  Reason for  Discharge: Patient/Caregiver request  Outcomes Achieved: Patient able to partially acheive established goals  Discharge Destination: Home with home program  Discharge Instructions/Additional Comments: Pt earned a free 30 day fitness for continue strengthening but will wait to use it until back rehab complete.      Daksha Evans, PTA  8/16/2018

## 2018-11-08 ENCOUNTER — OFFICE VISIT (OUTPATIENT)
Dept: FAMILY MEDICINE CLINIC | Facility: CLINIC | Age: 63
End: 2018-11-08

## 2018-11-08 VITALS
WEIGHT: 161.5 LBS | HEIGHT: 66 IN | OXYGEN SATURATION: 96 % | SYSTOLIC BLOOD PRESSURE: 120 MMHG | DIASTOLIC BLOOD PRESSURE: 80 MMHG | RESPIRATION RATE: 20 BRPM | HEART RATE: 70 BPM | BODY MASS INDEX: 25.96 KG/M2 | TEMPERATURE: 98 F

## 2018-11-08 DIAGNOSIS — R05.9 COUGH: ICD-10-CM

## 2018-11-08 DIAGNOSIS — J06.9 ACUTE URI: Primary | ICD-10-CM

## 2018-11-08 PROCEDURE — 99214 OFFICE O/P EST MOD 30 MIN: CPT | Performed by: NURSE PRACTITIONER

## 2018-11-08 RX ORDER — PREDNISONE 20 MG/1
20 TABLET ORAL DAILY
Qty: 10 TABLET | Refills: 0 | Status: SHIPPED | OUTPATIENT
Start: 2018-11-08 | End: 2018-11-18

## 2018-11-08 RX ORDER — AZITHROMYCIN 250 MG/1
TABLET, FILM COATED ORAL
Qty: 6 TABLET | Refills: 0 | Status: SHIPPED | OUTPATIENT
Start: 2018-11-08 | End: 2018-12-04

## 2018-11-08 RX ORDER — DEXTROMETHORPHAN HYDROBROMIDE AND PROMETHAZINE HYDROCHLORIDE 15; 6.25 MG/5ML; MG/5ML
5 SYRUP ORAL 4 TIMES DAILY PRN
Qty: 240 ML | Refills: 0 | Status: SHIPPED | OUTPATIENT
Start: 2018-11-08 | End: 2019-05-15 | Stop reason: SDUPTHER

## 2018-11-08 RX ORDER — FEXOFENADINE HCL AND PSEUDOEPHEDRINE HCI 180; 240 MG/1; MG/1
1 TABLET, EXTENDED RELEASE ORAL DAILY
Qty: 90 TABLET | Refills: 4 | Status: SHIPPED | OUTPATIENT
Start: 2018-11-08 | End: 2020-03-17

## 2018-11-29 NOTE — PROGRESS NOTES
Subjective   Genesis Salgado is a 63 y.o. female.     She presents today for cough, congestion, and URI symptoms that began in the last week.  She does have a history of autoimmune disease that predisposes her to infections.  She has not had any fever.  Reports that she has felt very fatigued recently as well.  Has been recovering well from her back surgery that she had back in July.  She is otherwise without any verbalized complaints today in the office.       URI    This is a new problem. The current episode started in the past 7 days. The problem has been gradually worsening. There has been no fever. Associated symptoms include congestion, coughing, rhinorrhea, sneezing and a sore throat. Pertinent negatives include no abdominal pain, chest pain, diarrhea, dysuria, ear pain, headaches, joint pain, joint swelling, nausea, neck pain, plugged ear sensation, rash, swollen glands, vomiting or wheezing. She has tried nothing for the symptoms. The treatment provided no relief.   Cough   This is a new problem. The current episode started in the past 7 days. The problem has been gradually worsening. The problem occurs every few minutes. The cough is non-productive. Associated symptoms include nasal congestion, postnasal drip, rhinorrhea and a sore throat. Pertinent negatives include no chest pain, chills, ear congestion, ear pain, fever, headaches, heartburn, hemoptysis, myalgias, rash, shortness of breath, sweats, weight loss or wheezing. She has tried nothing for the symptoms. The treatment provided no relief.        The following portions of the patient's history were reviewed and updated as appropriate: allergies, current medications, past family history, past medical history, past social history, past surgical history and problem list.    Review of Systems   Constitutional: Negative.  Negative for chills, fever and unexpected weight loss.   HENT: Positive for congestion, postnasal drip, rhinorrhea, sinus  pressure, sneezing and sore throat. Negative for ear pain.    Eyes: Negative.    Respiratory: Positive for cough and chest tightness. Negative for hemoptysis, shortness of breath and wheezing.    Cardiovascular: Negative.  Negative for chest pain.   Gastrointestinal: Negative.  Negative for abdominal pain, diarrhea, nausea and vomiting.   Genitourinary: Negative for dysuria.   Musculoskeletal: Negative.  Negative for joint pain, myalgias and neck pain.   Skin: Negative.  Negative for rash.   Allergic/Immunologic: Negative.    Neurological: Negative.    Hematological: Negative.    Psychiatric/Behavioral: Negative.        Objective   Physical Exam   Constitutional: She is oriented to person, place, and time. Vital signs are normal. She appears well-developed and well-nourished. No distress. She appears overweight.   HENT:   Head: Normocephalic.   Right Ear: External ear normal. A middle ear effusion is present.   Left Ear: External ear normal. A middle ear effusion is present.   Nose: Mucosal edema and rhinorrhea present.   Mouth/Throat: Posterior oropharyngeal edema and posterior oropharyngeal erythema present. No oropharyngeal exudate.   Eyes: Conjunctivae and EOM are normal. Pupils are equal, round, and reactive to light. Right eye exhibits no discharge. Left eye exhibits no discharge.   Neck: Normal range of motion. Neck supple. No tracheal deviation present. No thyromegaly present.   Cardiovascular: Normal rate, regular rhythm and normal heart sounds. Exam reveals no gallop and no friction rub.   No murmur heard.  Pulmonary/Chest: Effort normal and breath sounds normal. No respiratory distress. She has no wheezes. She has no rales. She exhibits no tenderness.   Musculoskeletal: Normal range of motion.   Lymphadenopathy:     She has no cervical adenopathy.   Neurological: She is alert and oriented to person, place, and time.   Skin: Skin is warm and dry. Capillary refill takes less than 2 seconds. No rash noted.  She is not diaphoretic. No erythema. No pallor.   Psychiatric: She has a normal mood and affect. Her behavior is normal. Judgment and thought content normal.   Nursing note and vitals reviewed.        Assessment/Plan   Genesis was seen today for cough.    Diagnoses and all orders for this visit:    Acute URI  -     fexofenadine-pseudoephedrine (ALLEGRA-D 24) 180-240 MG per 24 hr tablet; Take 1 tablet by mouth Daily.  -     predniSONE (DELTASONE) 20 MG tablet; Take 1 tablet by mouth Daily for 10 days.  -     azithromycin (ZITHROMAX Z-ILYA) 250 MG tablet; Take 2 tablets the first day, then 1 tablet daily for 4 days.    Cough  -     promethazine-dextromethorphan (PROMETHAZINE-DM) 6.25-15 MG/5ML syrup; Take 5 mL by mouth 4 (Four) Times a Day As Needed for Cough.    Patient's Body mass index is 26.07 kg/m². BMI is above normal parameters. Recommendations include: educational material.  Plenty of fluids.  Finish all antibiotics and steroids.  Allegra D daily for congestion symptoms.  Phenergan DM PRN cough symptoms.  Continue all other current medications.  Follow up as scheduled for routine follow up.  Follow up sooner for problems/concerns.  Patient verbalized understanding and agreement with plan of care.        This document has been electronically signed by TEDDY Zelaya on November 28, 2018 11:07 PM

## 2018-12-04 ENCOUNTER — APPOINTMENT (OUTPATIENT)
Dept: LAB | Facility: HOSPITAL | Age: 63
End: 2018-12-04

## 2018-12-04 ENCOUNTER — OFFICE VISIT (OUTPATIENT)
Dept: FAMILY MEDICINE CLINIC | Facility: CLINIC | Age: 63
End: 2018-12-04

## 2018-12-04 VITALS
TEMPERATURE: 98.2 F | BODY MASS INDEX: 25.76 KG/M2 | WEIGHT: 160.3 LBS | HEIGHT: 66 IN | OXYGEN SATURATION: 98 % | HEART RATE: 76 BPM

## 2018-12-04 DIAGNOSIS — R19.7 DIARRHEA IN ADULT PATIENT: Primary | ICD-10-CM

## 2018-12-04 PROCEDURE — 99214 OFFICE O/P EST MOD 30 MIN: CPT | Performed by: NURSE PRACTITIONER

## 2018-12-04 PROCEDURE — 85027 COMPLETE CBC AUTOMATED: CPT | Performed by: NURSE PRACTITIONER

## 2018-12-04 PROCEDURE — 80053 COMPREHEN METABOLIC PANEL: CPT | Performed by: NURSE PRACTITIONER

## 2018-12-04 NOTE — PATIENT INSTRUCTIONS
Diarrhea, Adult  Diarrhea is frequent loose and watery bowel movements. Diarrhea can make you feel weak and cause you to become dehydrated. Dehydration can make you tired and thirsty, cause you to have a dry mouth, and decrease how often you urinate. Diarrhea typically lasts 2-3 days. However, it can last longer if it is a sign of something more serious. It is important to treat your diarrhea as told by your health care provider.  Follow these instructions at home:  Eating and drinking    Follow these recommendations as told by your health care provider:  · Take an oral rehydration solution (ORS). This is a drink that is sold at pharmacies and retail stores.  · Drink clear fluids, such as water, ice chips, diluted fruit juice, and low-calorie sports drinks.  · Eat bland, easy-to-digest foods in small amounts as you are able. These foods include bananas, applesauce, rice, lean meats, toast, and crackers.  · Avoid drinking fluids that contain a lot of sugar or caffeine, such as energy drinks, sports drinks, and soda.  · Avoid alcohol.  · Avoid spicy or fatty foods.    General instructions  · Drink enough fluid to keep your urine clear or pale yellow.  · Wash your hands often. If soap and water are not available, use hand .  · Make sure that all people in your household wash their hands well and often.  · Take over-the-counter and prescription medicines only as told by your health care provider.  · Rest at home while you recover.  · Watch your condition for any changes.  · Take a warm bath to relieve any burning or pain from frequent diarrhea episodes.  · Keep all follow-up visits as told by your health care provider. This is important.  Contact a health care provider if:  · You have a fever.  · Your diarrhea gets worse.  · You have new symptoms.  · You cannot keep fluids down.  · You feel light-headed or dizzy.  · You have a headache  · You have muscle cramps.  Get help right away if:  · You have chest  pain.  · You feel extremely weak or you faint.  · You have bloody or black stools or stools that look like tar.  · You have severe pain, cramping, or bloating in your abdomen.  · You have trouble breathing or you are breathing very quickly.  · Your heart is beating very quickly.  · Your skin feels cold and clammy.  · You feel confused.  · You have signs of dehydration, such as:  ? Dark urine, very little urine, or no urine.  ? Cracked lips.  ? Dry mouth.  ? Sunken eyes.  ? Sleepiness.  ? Weakness.  This information is not intended to replace advice given to you by your health care provider. Make sure you discuss any questions you have with your health care provider.  Document Released: 12/08/2003 Document Revised: 04/27/2017 Document Reviewed: 08/23/2016  [employer] Interactive Patient Education © 2018 [employer]

## 2018-12-05 LAB
ADV 40+41 DNA STL QL NAA+NON-PROBE: NOT DETECTED
ALBUMIN SERPL-MCNC: 4.1 G/DL (ref 3.4–4.8)
ALBUMIN/GLOB SERPL: 1.5 G/DL (ref 1.1–1.8)
ALP SERPL-CCNC: 60 U/L (ref 38–126)
ALT SERPL W P-5'-P-CCNC: 42 U/L (ref 9–52)
ANION GAP SERPL CALCULATED.3IONS-SCNC: 9 MMOL/L (ref 5–15)
AST SERPL-CCNC: 25 U/L (ref 14–36)
ASTRO TYP 1-8 RNA STL QL NAA+NON-PROBE: NOT DETECTED
BILIRUB SERPL-MCNC: 0.4 MG/DL (ref 0.2–1.3)
BUN BLD-MCNC: 11 MG/DL (ref 7–21)
BUN/CREAT SERPL: 13.1 (ref 7–25)
C CAYETANENSIS DNA STL QL NAA+NON-PROBE: NOT DETECTED
C DIFF TOX GENS STL QL NAA+PROBE: NOT DETECTED
CALCIUM SPEC-SCNC: 9 MG/DL (ref 8.4–10.2)
CAMPY SP DNA.DIARRHEA STL QL NAA+PROBE: NOT DETECTED
CHLORIDE SERPL-SCNC: 99 MMOL/L (ref 95–110)
CO2 SERPL-SCNC: 30 MMOL/L (ref 22–31)
CREAT BLD-MCNC: 0.84 MG/DL (ref 0.5–1)
CRYPTOSP STL CULT: NOT DETECTED
DEPRECATED RDW RBC AUTO: 40.8 FL (ref 36.4–46.3)
E COLI DNA SPEC QL NAA+PROBE: NOT DETECTED
E HISTOLYT AG STL-ACNC: NOT DETECTED
EAEC PAA PLAS AGGR+AATA ST NAA+NON-PRB: NOT DETECTED
EC STX1 + STX2 GENES STL NAA+PROBE: NOT DETECTED
EPEC EAE GENE STL QL NAA+NON-PROBE: NOT DETECTED
ERYTHROCYTE [DISTWIDTH] IN BLOOD BY AUTOMATED COUNT: 12.5 % (ref 11.5–14.5)
ETEC LTA+ST1A+ST1B TOX ST NAA+NON-PROBE: NOT DETECTED
G LAMBLIA DNA SPEC QL NAA+PROBE: NOT DETECTED
GFR SERPL CREATININE-BSD FRML MDRD: 68 ML/MIN/1.73 (ref 45–104)
GLOBULIN UR ELPH-MCNC: 2.7 GM/DL (ref 2.3–3.5)
GLUCOSE BLD-MCNC: 105 MG/DL (ref 60–100)
HCT VFR BLD AUTO: 39.5 % (ref 35–45)
HGB BLD-MCNC: 13.4 G/DL (ref 12–15.5)
MCH RBC QN AUTO: 30.3 PG (ref 26.5–34)
MCHC RBC AUTO-ENTMCNC: 33.9 G/DL (ref 31.4–36)
MCV RBC AUTO: 89.4 FL (ref 80–98)
NOROVIRUS GI+II RNA STL QL NAA+NON-PROBE: NOT DETECTED
P SHIGELLOIDES DNA STL QL NAA+NON-PROBE: NOT DETECTED
PLATELET # BLD AUTO: 264 10*3/MM3 (ref 150–450)
PMV BLD AUTO: 10.9 FL (ref 8–12)
POTASSIUM BLD-SCNC: 3.8 MMOL/L (ref 3.5–5.1)
PROT SERPL-MCNC: 6.8 G/DL (ref 6.3–8.6)
RBC # BLD AUTO: 4.42 10*6/MM3 (ref 3.77–5.16)
RV RNA STL NAA+PROBE: NOT DETECTED
SALMONELLA DNA SPEC QL NAA+PROBE: NOT DETECTED
SAPO I+II+IV+V RNA STL QL NAA+NON-PROBE: DETECTED
SHIGELLA SP+EIEC IPAH STL QL NAA+PROBE: NOT DETECTED
SODIUM BLD-SCNC: 138 MMOL/L (ref 137–145)
V CHOLERAE DNA SPEC QL NAA+PROBE: NOT DETECTED
VIBRIO DNA SPEC NAA+PROBE: NOT DETECTED
WBC NRBC COR # BLD: 6.13 10*3/MM3 (ref 3.2–9.8)
YERSINIA STL CULT: NOT DETECTED

## 2018-12-05 PROCEDURE — 87999 UNLISTED MICROBIOLOGY PX: CPT | Performed by: NURSE PRACTITIONER

## 2018-12-05 NOTE — PROGRESS NOTES
She has sapovirus which is a form of a stomach virus.  It is viral in nature.  Her blood counts and electrolytes look good.  Treatment is preventing dehydration and bland diet.

## 2018-12-07 ENCOUNTER — TELEPHONE (OUTPATIENT)
Dept: FAMILY MEDICINE CLINIC | Facility: CLINIC | Age: 63
End: 2018-12-07

## 2018-12-07 NOTE — TELEPHONE ENCOUNTER
----- Message from TEDDY Zelaya sent at 12/5/2018  3:51 PM CST -----  She has sapovirus which is a form of a stomach virus.  It is viral in nature.  Her blood counts and electrolytes look good.  Treatment is preventing dehydration and bland diet.    Pt was informed of this result . Pt stated that she feels she has fluid behind her ears. She is taking the antibotic.

## 2018-12-17 NOTE — PROGRESS NOTES
"Subjective   Genesispeter Salgado is a 63 y.o. female.     She presents today for evaluation of diarrhea and upset stomach over the last several days.  She reports that she has been eating a bland diet, but no matter what she eats it just \"runs right through her\".  She denies any fever.  She also denies any sick contacts.  She is otherwise without any new complaints today in the office.  Reports that her lupus has been well controlled with her current medication regimen.  Her chronic back problems have also been well controlled recently as well.       Diarrhea    This is a new problem. The current episode started 1 to 4 weeks ago. The problem occurs 2 to 4 times per day. The problem has been waxing and waning. The stool consistency is described as mucous and watery. Associated symptoms include abdominal pain and increased flatus. Pertinent negatives include no arthralgias, bloating, chills, coughing, fever, headaches, myalgias, sweats, URI, vomiting or weight loss. There are no known risk factors. She has tried nothing for the symptoms. The treatment provided no relief.        The following portions of the patient's history were reviewed and updated as appropriate: allergies, current medications, past family history, past medical history, past social history, past surgical history and problem list.    Review of Systems   Constitutional: Negative.  Negative for chills, fever and unexpected weight loss.   HENT: Negative.    Eyes: Negative.    Respiratory: Negative.  Negative for cough.    Cardiovascular: Negative.    Gastrointestinal: Positive for abdominal pain, diarrhea, flatus and indigestion. Negative for bloating and vomiting.   Musculoskeletal: Negative.  Negative for arthralgias and myalgias.   Skin: Negative.    Allergic/Immunologic: Negative.    Neurological: Negative.    Hematological: Negative.    Psychiatric/Behavioral: Negative.        Objective   Physical Exam   Constitutional: She is oriented to person, " place, and time. Vital signs are normal. She appears well-developed and well-nourished. No distress.   HENT:   Head: Normocephalic.   Right Ear: External ear normal.   Left Ear: External ear normal.   Nose: Nose normal.   Mouth/Throat: Oropharynx is clear and moist. No oropharyngeal exudate.   Eyes: Conjunctivae and EOM are normal. Pupils are equal, round, and reactive to light. Right eye exhibits no discharge. Left eye exhibits no discharge.   Neck: Normal range of motion. Neck supple. No tracheal deviation present. No thyromegaly present.   Cardiovascular: Normal rate, regular rhythm and normal heart sounds. Exam reveals no gallop and no friction rub.   No murmur heard.  Pulmonary/Chest: Effort normal and breath sounds normal. No respiratory distress. She has no wheezes. She has no rales. She exhibits no tenderness.   Abdominal: Soft. She exhibits no distension and no mass. There is no tenderness. There is no rebound and no guarding. No hernia.   Hyperactive bowel sounds x 4 quadrants   Musculoskeletal: Normal range of motion.   Lymphadenopathy:     She has no cervical adenopathy.   Neurological: She is alert and oriented to person, place, and time.   Skin: Skin is warm and dry. Capillary refill takes less than 2 seconds. No rash noted. She is not diaphoretic. No erythema. No pallor.   Psychiatric: She has a normal mood and affect. Her behavior is normal. Judgment and thought content normal.   Nursing note and vitals reviewed.        Assessment/Plan   Genesis was seen today for diarrhea, fatigue and gas.    Diagnoses and all orders for this visit:    Diarrhea in adult patient  -     CBC (No Diff)  -     Comprehensive Metabolic Panel  -     Gastrointestinal Panel, PCR - Stool, Per Rectum    Patient's Body mass index is 25.87 kg/m². BMI is within normal parameters. No follow-up required.  Lab following office visit.  [place] diet as directed.  We will notify the patient with her results as soon as they become  available.  Continue current medications.  Follow up as scheduled for routine follow up.  Follow up sooner for problems/concerns.  Patient verbalized understanding and agreement with plan of care.        This document has been electronically signed by TEDDY Zelaya on December 16, 2018 10:43 PM

## 2019-02-22 DIAGNOSIS — E55.9 VITAMIN D DEFICIENCY DISEASE: ICD-10-CM

## 2019-02-25 RX ORDER — ERGOCALCIFEROL 1.25 MG/1
CAPSULE ORAL
Qty: 12 CAPSULE | Refills: 3 | Status: SHIPPED | OUTPATIENT
Start: 2019-02-25 | End: 2020-01-16

## 2019-03-12 RX ORDER — MECLIZINE HYDROCHLORIDE 25 MG/1
TABLET ORAL
Qty: 270 TABLET | Refills: 3 | Status: SHIPPED | OUTPATIENT
Start: 2019-03-12 | End: 2020-03-18

## 2019-04-02 DIAGNOSIS — M32.9 SYSTEMIC LUPUS ERYTHEMATOSUS, UNSPECIFIED SLE TYPE, UNSPECIFIED ORGAN INVOLVEMENT STATUS (HCC): ICD-10-CM

## 2019-04-02 RX ORDER — HYDROXYCHLOROQUINE SULFATE 200 MG/1
TABLET, FILM COATED ORAL
Qty: 90 TABLET | Refills: 0 | Status: SHIPPED | OUTPATIENT
Start: 2019-04-02

## 2019-05-15 ENCOUNTER — OFFICE VISIT (OUTPATIENT)
Dept: FAMILY MEDICINE CLINIC | Facility: CLINIC | Age: 64
End: 2019-05-15

## 2019-05-15 VITALS
HEART RATE: 82 BPM | OXYGEN SATURATION: 98 % | DIASTOLIC BLOOD PRESSURE: 90 MMHG | SYSTOLIC BLOOD PRESSURE: 136 MMHG | HEIGHT: 66 IN | WEIGHT: 163.8 LBS | TEMPERATURE: 97.9 F | BODY MASS INDEX: 26.33 KG/M2

## 2019-05-15 DIAGNOSIS — H65.02 ACUTE SEROUS OTITIS MEDIA OF LEFT EAR, RECURRENCE NOT SPECIFIED: Primary | ICD-10-CM

## 2019-05-15 DIAGNOSIS — R05.9 COUGH: ICD-10-CM

## 2019-05-15 DIAGNOSIS — R49.0 HOARSENESS: ICD-10-CM

## 2019-05-15 DIAGNOSIS — J30.2 SEASONAL ALLERGIC RHINITIS, UNSPECIFIED TRIGGER: ICD-10-CM

## 2019-05-15 PROCEDURE — 99214 OFFICE O/P EST MOD 30 MIN: CPT | Performed by: NURSE PRACTITIONER

## 2019-05-15 RX ORDER — AZITHROMYCIN 250 MG/1
TABLET, FILM COATED ORAL
Qty: 6 TABLET | Refills: 0 | Status: SHIPPED | OUTPATIENT
Start: 2019-05-15 | End: 2019-05-15 | Stop reason: SDUPTHER

## 2019-05-15 RX ORDER — AZITHROMYCIN 250 MG/1
TABLET, FILM COATED ORAL
Qty: 6 TABLET | Refills: 0 | Status: SHIPPED | OUTPATIENT
Start: 2019-05-15 | End: 2019-09-05

## 2019-05-15 RX ORDER — DEXTROMETHORPHAN HYDROBROMIDE AND PROMETHAZINE HYDROCHLORIDE 15; 6.25 MG/5ML; MG/5ML
5 SYRUP ORAL 4 TIMES DAILY PRN
Qty: 240 ML | Refills: 0 | Status: SHIPPED | OUTPATIENT
Start: 2019-05-15 | End: 2019-09-05

## 2019-05-15 RX ORDER — PREDNISONE 20 MG/1
20 TABLET ORAL DAILY
Qty: 7 TABLET | Refills: 0 | Status: SHIPPED | OUTPATIENT
Start: 2019-05-15 | End: 2020-03-02

## 2019-05-15 RX ORDER — DEXTROMETHORPHAN HYDROBROMIDE AND PROMETHAZINE HYDROCHLORIDE 15; 6.25 MG/5ML; MG/5ML
5 SYRUP ORAL 4 TIMES DAILY PRN
Qty: 240 ML | Refills: 0 | Status: SHIPPED | OUTPATIENT
Start: 2019-05-15 | End: 2019-05-15 | Stop reason: SDUPTHER

## 2019-05-15 RX ORDER — PREDNISONE 20 MG/1
20 TABLET ORAL DAILY
Qty: 7 TABLET | Refills: 0 | Status: SHIPPED | OUTPATIENT
Start: 2019-05-15 | End: 2019-05-15 | Stop reason: SDUPTHER

## 2019-05-15 NOTE — PROGRESS NOTES
"Subjective   Genesis Mery Salgado is a 64 y.o. female.     [facility] Visit    PCP: TEDDY Gloria    CC: \"fluid behind ear\"    PMH: autoimmune (lupus, UC); colon cancer (in remission x 12 years)    Started with allergy symptoms and has progressed.  Had similar in the fall and responded well to Zithromax, prednisone and Phenergan DM syrup.       Ear Fullness    There is pain in the left ear. This is a new problem. The current episode started 1 to 4 weeks ago. The problem occurs constantly. The problem has been gradually worsening. There has been no fever. The patient is experiencing no pain. Associated symptoms include coughing (occasional), hearing loss (muffled left ear), rhinorrhea and a sore throat (scratchy at times). Pertinent negatives include no abdominal pain, diarrhea, ear discharge, headaches, neck pain, rash or vomiting. Treatments tried: allegra d. The treatment provided mild relief.        The following portions of the patient's history were reviewed and updated as appropriate: allergies, current medications, past medical history, past social history, past surgical history and problem list.    Review of Systems   Constitutional: Negative for appetite change, chills, fatigue and fever.   HENT: Positive for congestion ( off/on), hearing loss (muffled left ear), postnasal drip, rhinorrhea, sinus pressure (off/on maxillary), sore throat (scratchy at times) and voice change (hoarseness off/on ). Negative for ear discharge, ear pain ( fullness on left side) and sneezing.    Eyes: Negative for discharge and itching.   Respiratory: Positive for cough (occasional). Negative for chest tightness, shortness of breath and wheezing.    Cardiovascular: Negative for chest pain and leg swelling.   Gastrointestinal: Negative for abdominal pain, diarrhea, nausea and vomiting.   Genitourinary: Negative for difficulty urinating.   Musculoskeletal: Negative for neck pain.   Skin: Negative for rash.   Neurological: " "Positive for dizziness (off/on). Negative for headache.     /90   Pulse 82   Temp 97.9 °F (36.6 °C)   Ht 167.6 cm (66\")   Wt 74.3 kg (163 lb 12.8 oz)   SpO2 98%   BMI 26.44 kg/m²     Objective   Physical Exam   Constitutional: She is oriented to person, place, and time. She appears well-developed and well-nourished. No distress.   HENT:   Head: Normocephalic and atraumatic.   Right Ear: Ear canal normal. Tympanic membrane is not erythematous. A middle ear effusion ( minimal) is present.   Left Ear: Tympanic membrane is not erythematous. A middle ear effusion (large) is present. Decreased hearing is noted.   Nose: Mucosal edema (pale, boggy) and congestion (mild) present. Right sinus exhibits maxillary sinus tenderness ( minimal). Right sinus exhibits no frontal sinus tenderness. Left sinus exhibits maxillary sinus tenderness (minimal). Left sinus exhibits no frontal sinus tenderness.   Mouth/Throat: Uvula is midline and mucous membranes are normal. No oropharyngeal exudate, posterior oropharyngeal edema or posterior oropharyngeal erythema.   +PND     Eyes: Conjunctivae are normal. Right eye exhibits no discharge. Left eye exhibits no discharge.   Neck: Neck supple.   Cardiovascular: Normal rate and regular rhythm.   Pulmonary/Chest: Effort normal and breath sounds normal. She has no wheezes. She has no rales.   +loose cough     Lymphadenopathy:     She has cervical adenopathy ( shotty on left).   Neurological: She is alert and oriented to person, place, and time.   Nursing note and vitals reviewed.    No results found for this or any previous visit (from the past 24 hour(s)).  No Images in the past 120 days found..      Assessment/Plan   Genesis was seen today for ear drainage, nasal congestion and cough.    Diagnoses and all orders for this visit:    Acute serous otitis media of left ear, recurrence not specified  -     Discontinue: azithromycin (ZITHROMAX Z-ILYA) 250 MG tablet; Take 2 tablets the first " day, then 1 tablet daily for 4 days.  -     Discontinue: predniSONE (DELTASONE) 20 MG tablet; Take 1 tablet by mouth Daily.  -     predniSONE (DELTASONE) 20 MG tablet; Take 1 tablet by mouth Daily.  -     azithromycin (ZITHROMAX Z-ILYA) 250 MG tablet; Take 2 tablets the first day, then 1 tablet daily for 4 days.    Cough  -     Discontinue: promethazine-dextromethorphan (PROMETHAZINE-DM) 6.25-15 MG/5ML syrup; Take 5 mL by mouth 4 (Four) Times a Day As Needed for Cough.  -     promethazine-dextromethorphan (PROMETHAZINE-DM) 6.25-15 MG/5ML syrup; Take 5 mL by mouth 4 (Four) Times a Day As Needed for Cough.    Seasonal allergic rhinitis, unspecified trigger  -     Discontinue: predniSONE (DELTASONE) 20 MG tablet; Take 1 tablet by mouth Daily.  -     predniSONE (DELTASONE) 20 MG tablet; Take 1 tablet by mouth Daily.      Push fluids  Rest  Cool mist humidifier  Continue with Allegra D  Rx for Zithromax, Prednisone, Phenergan DM --patient changed mind on pharmacy after RX sent to [facility]--original Rx cancelled at [facility] pharmacy  If hoarseness persists, may need to see ENT--will let me or PCP know    See PCP or RTC if symptoms persist/worsen  See PCP for routine f/u visit and management of chronic medical conditions

## 2019-05-15 NOTE — PATIENT INSTRUCTIONS
Allergic Rhinitis, Adult  Allergic rhinitis is an allergic reaction that affects the mucous membrane inside the nose. It causes sneezing, a runny or stuffy nose, and the feeling of mucus going down the back of the throat (postnasal drip). Allergic rhinitis can be mild to severe.  There are two types of allergic rhinitis:  · Seasonal. This type is also called hay fever. It happens only during certain seasons.  · Perennial. This type can happen at any time of the year.    What are the causes?  This condition happens when the body's defense system (immune system) responds to certain harmless substances called allergens as though they were germs.   Seasonal allergic rhinitis is triggered by pollen, which can come from grasses, trees, and weeds. Perennial allergic rhinitis may be caused by:  · House dust mites.  · Pet dander.  · Mold spores.    What are the signs or symptoms?  Symptoms of this condition include:  · Sneezing.  · Runny or stuffy nose (nasal congestion).  · Postnasal drip.  · Itchy nose.  · Tearing of the eyes.  · Trouble sleeping.  · Daytime sleepiness.    How is this diagnosed?  This condition may be diagnosed based on:  · Your medical history.  · A physical exam.  · Tests to check for related conditions, such as:  ? Asthma.  ? Pink eye.  ? Ear infection.  ? Upper respiratory infection.  · Tests to find out which allergens trigger your symptoms. These may include skin or blood tests.    How is this treated?  There is no cure for this condition, but treatment can help control symptoms. Treatment may include:  · Taking medicines that block allergy symptoms, such as antihistamines. Medicine may be given as a shot, nasal spray, or pill.  · Avoiding the allergen.  · Desensitization. This treatment involves getting ongoing shots until your body becomes less sensitive to the allergen. This treatment may be done if other treatments do not help.  · If taking medicine and avoiding the allergen does not work, new,  stronger medicines may be prescribed.    Follow these instructions at home:  · Find out what you are allergic to. Common allergens include smoke, dust, and pollen.  · Avoid the things you are allergic to. These are some things you can do to help avoid allergens:  ? Replace carpet with wood, tile, or vinyl jason. Carpet can trap dander and dust.  ? Do not smoke. Do not allow smoking in your home.  ? Change your heating and air conditioning filter at least once a month.  ? During allergy season:  § Keep windows closed as much as possible.  § Plan outdoor activities when pollen counts are lowest. This is usually during the evening hours.  § When coming indoors, change clothing and shower before sitting on furniture or bedding.  · Take over-the-counter and prescription medicines only as told by your health care provider.  · Keep all follow-up visits as told by your health care provider. This is important.  Contact a health care provider if:  · You have a fever.  · You develop a persistent cough.  · You make whistling sounds when you breathe (you wheeze).  · Your symptoms interfere with your normal daily activities.  Get help right away if:  · You have shortness of breath.  Summary  · This condition can be managed by taking medicines as directed and avoiding allergens.  · Contact your health care provider if you develop a persistent cough or fever.  · During allergy season, keep windows closed as much as possible.  This information is not intended to replace advice given to you by your health care provider. Make sure you discuss any questions you have with your health care provider.  Document Released: 09/12/2002 Document Revised: 01/25/2018 Document Reviewed: 01/25/2018  Elsevier Interactive Patient Education © 2019 [employer]

## 2019-05-17 RX ORDER — DEXLANSOPRAZOLE 60 MG/1
60 CAPSULE, DELAYED RELEASE ORAL DAILY
Qty: 90 CAPSULE | Refills: 4 | Status: SHIPPED | OUTPATIENT
Start: 2019-05-17 | End: 2020-04-01 | Stop reason: SDUPTHER

## 2019-06-20 DIAGNOSIS — M32.9 SYSTEMIC LUPUS ERYTHEMATOSUS, UNSPECIFIED SLE TYPE, UNSPECIFIED ORGAN INVOLVEMENT STATUS (HCC): ICD-10-CM

## 2019-06-20 RX ORDER — HYDROXYCHLOROQUINE SULFATE 200 MG/1
TABLET, FILM COATED ORAL
Qty: 90 TABLET | Refills: 0 | OUTPATIENT
Start: 2019-06-20

## 2019-09-05 ENCOUNTER — OFFICE VISIT (OUTPATIENT)
Dept: FAMILY MEDICINE CLINIC | Facility: CLINIC | Age: 64
End: 2019-09-05

## 2019-09-05 VITALS
HEIGHT: 66 IN | RESPIRATION RATE: 20 BRPM | WEIGHT: 158.3 LBS | OXYGEN SATURATION: 99 % | TEMPERATURE: 97.7 F | SYSTOLIC BLOOD PRESSURE: 120 MMHG | BODY MASS INDEX: 25.44 KG/M2 | HEART RATE: 64 BPM | DIASTOLIC BLOOD PRESSURE: 80 MMHG

## 2019-09-05 DIAGNOSIS — R06.02 SHORTNESS OF BREATH: ICD-10-CM

## 2019-09-05 DIAGNOSIS — M79.605 BILATERAL LEG PAIN: Primary | ICD-10-CM

## 2019-09-05 DIAGNOSIS — E53.8 B12 DEFICIENCY: ICD-10-CM

## 2019-09-05 DIAGNOSIS — M79.604 BILATERAL LEG PAIN: Primary | ICD-10-CM

## 2019-09-05 PROCEDURE — 99214 OFFICE O/P EST MOD 30 MIN: CPT | Performed by: NURSE PRACTITIONER

## 2019-09-05 PROCEDURE — 96372 THER/PROPH/DIAG INJ SC/IM: CPT | Performed by: NURSE PRACTITIONER

## 2019-09-05 RX ORDER — CYANOCOBALAMIN 1000 UG/ML
1000 INJECTION, SOLUTION INTRAMUSCULAR; SUBCUTANEOUS ONCE
Status: COMPLETED | OUTPATIENT
Start: 2019-09-05 | End: 2019-09-05

## 2019-09-05 RX ADMIN — CYANOCOBALAMIN 1000 MCG: 1000 INJECTION, SOLUTION INTRAMUSCULAR; SUBCUTANEOUS at 09:58

## 2019-09-05 NOTE — PATIENT INSTRUCTIONS
Vitamin B12 Deficiency  Vitamin B12 deficiency occurs when the body does not have enough vitamin B12. Vitamin B12 is an important vitamin. The body needs vitamin B12:  · To make red blood cells.  · To make DNA. This is the genetic material inside cells.  · To help the nerves work properly so they can carry messages from the brain to the body.  Vitamin B12 deficiency can cause various health problems, such as a low red blood cell count (anemia) or nerve damage.  What are the causes?  This condition may be caused by:  · Not eating enough foods that contain vitamin B12.  · Not having enough stomach acid and digestive fluids to properly absorb vitamin B12 from the food that you eat.  · Certain digestive system diseases that make it hard to absorb vitamin B12. These diseases include Crohn disease, chronic pancreatitis, and cystic fibrosis.  · Pernicious anemia. This is a condition in which the body does not make enough of a protein (intrinsic factor), resulting in too few red blood cells.  · Having a surgery in which part of the stomach or small intestine is removed.  · Taking certain medicines that make it hard for the body to absorb vitamin B12. These medicines include:  ? Heartburn medicine (antacids and proton pump inhibitors).  ? An antibiotic medicine called neomycin.  ? Some medicines that are used to treat diabetes, tuberculosis, gout, or high cholesterol.  What increases the risk?  The following factors may make you more likely to develop a B12 deficiency:  · Being older than age 50.  · Eating a vegetarian or vegan diet, especially while you are pregnant.  · Eating a poor diet while you are pregnant.  · Taking certain drugs.  · Having alcoholism.  What are the signs or symptoms?  In some cases, there are no symptoms of this condition. If the condition leads to anemia or nerve damage, various symptoms can occur, such as:  · Weakness.  · Fatigue.  · Loss of appetite.  · Weight loss.  · Numbness or tingling in your  hands and feet.  · Redness and burning of the tongue.  · Confusion or memory problems.  · Depression.  · Sensory problems, such as color blindness, ringing in the ears, or loss of taste.  · Diarrhea or constipation.  · Trouble walking.  If anemia is severe, symptoms can include:  · Shortness of breath.  · Dizziness.  · Rapid heart rate (tachycardia).    How is this diagnosed?  This condition may be diagnosed with a blood test to measure the level of vitamin B12 in your blood. You may have other tests to help find the cause of your vitamin B12 deficiency. These tests may include:  · A complete blood count (CBC). This is a group of tests that measure certain characteristics of blood cells.  · A blood test to measure intrinsic factor.  · An endoscopy. In this procedure, a thin tube with a camera on the end is used to look into your stomach or intestines.  How is this treated?  Treatment for this condition depends on the cause. Common treatment options include:  · Changing your eating and drinking habits, such as:  ? Eating more foods that contain vitamin B12.  ? Drinking less alcohol or no alcohol.  · Taking vitamin B12 supplements. Your health care provider will tell you which dosage is best for you.  · Getting vitamin B12 injections.  Follow these instructions at home:  · Take supplements only as told by your health care provider. Follow the directions carefully.  · Get any injections that are prescribed by your health care provider.  Do not miss your appointments.  · Eat lots of healthy foods that contain vitamin B12. Ask your health care provider if you should work with a dietitian. Foods that contain vitamin B12 include:  ? Meat.  ? Meat from birds (poultry).  ? Fish.  ? Eggs.  ? Cereal and dairy products that are fortified. This means that vitamin B12 has been added to the food. Check the label on the package to see if the food is fortified.  · Do not abuse alcohol.  · Keep all follow-up visits as told by your  health care provider. This is important.  Contact a health care provider if:  · Your symptoms come back.  Get help right away if:  · You develop shortness of breath.  · You have chest pain.  · You become dizzy or you lose consciousness.  This information is not intended to replace advice given to you by your health care provider. Make sure you discuss any questions you have with your health care provider.  Document Released: 03/11/2013 Document Revised: 05/31/2017 Document Reviewed: 05/04/2016  [employer] Interactive Patient Education © 2019 [employer]

## 2019-09-09 ENCOUNTER — TELEPHONE (OUTPATIENT)
Dept: FAMILY MEDICINE CLINIC | Facility: CLINIC | Age: 64
End: 2019-09-09

## 2019-09-23 NOTE — PROGRESS NOTES
Subjective   Genesis Salgado is a 64 y.o. female.     She presents today for difficulty with peripheral neuropathy symptoms.  She reports that this has been a problem for her in the past.  She has also had some shortness of breath.  She reports that she feels like this may be allergy related.  She denies any chest pain, palpitations, dizziness, etc.  She does have a history of B12 deficiency in the past.  She would like to receive a B12 injection today in the office.  She is otherwise without any other new complaints.      Peripheral Neuropathy   This is a recurrent problem. The current episode started more than 1 month ago. The problem occurs intermittently. The problem has been waxing and waning. Associated symptoms include fatigue and myalgias. Pertinent negatives include no abdominal pain, anorexia, arthralgias, change in bowel habit, chest pain, chills, congestion, coughing, diaphoresis, fever, headaches, joint swelling, nausea, neck pain, numbness, rash, sore throat, swollen glands, urinary symptoms, vertigo, visual change, vomiting or weakness. The treatment provided no relief.   Shortness of Breath   This is a new problem. The current episode started 1 to 4 weeks ago. The problem occurs intermittently. The problem has been waxing and waning. Pertinent negatives include no abdominal pain, chest pain, claudication, coryza, ear pain, fever, headaches, hemoptysis, leg pain, leg swelling, neck pain, orthopnea, PND, rash, rhinorrhea, sore throat, sputum production, swollen glands, syncope, vomiting or wheezing. The treatment provided no relief.        The following portions of the patient's history were reviewed and updated as appropriate: allergies, current medications, past family history, past medical history, past social history, past surgical history and problem list.    Review of Systems   Constitutional: Positive for fatigue. Negative for chills, diaphoresis and fever.   HENT: Negative.  Negative for  congestion, ear pain, rhinorrhea, sore throat and swollen glands.    Eyes: Negative.    Respiratory: Positive for shortness of breath. Negative for cough, hemoptysis, sputum production and wheezing.    Cardiovascular: Negative.  Negative for chest pain, orthopnea, claudication, leg swelling, syncope and PND.   Gastrointestinal: Negative.  Negative for abdominal pain, anorexia, change in bowel habit, nausea and vomiting.   Musculoskeletal: Positive for myalgias. Negative for arthralgias, joint swelling and neck pain.   Skin: Negative.  Negative for rash.   Allergic/Immunologic: Negative.    Neurological: Negative.  Negative for vertigo, weakness and numbness.   Hematological: Negative.    Psychiatric/Behavioral: Negative.        Objective   Physical Exam   Constitutional: She is oriented to person, place, and time. Vital signs are normal. She appears well-developed and well-nourished. No distress.   HENT:   Head: Normocephalic.   Right Ear: External ear normal.   Left Ear: External ear normal.   Nose: Nose normal.   Mouth/Throat: Oropharynx is clear and moist. No oropharyngeal exudate.   Eyes: Conjunctivae and EOM are normal. Pupils are equal, round, and reactive to light. Right eye exhibits no discharge. Left eye exhibits no discharge.   Neck: Normal range of motion. Neck supple. No tracheal deviation present. No thyromegaly present.   Cardiovascular: Normal rate, regular rhythm and normal heart sounds. Exam reveals no gallop and no friction rub.   No murmur heard.  Pulmonary/Chest: Effort normal and breath sounds normal. No respiratory distress. She has no wheezes. She has no rales. She exhibits no tenderness.   Musculoskeletal: Normal range of motion.   Lymphadenopathy:     She has no cervical adenopathy.   Neurological: She is alert and oriented to person, place, and time.   Skin: Skin is warm and dry. Capillary refill takes less than 2 seconds. No rash noted. She is not diaphoretic. No erythema. No pallor.    Psychiatric: She has a normal mood and affect. Her behavior is normal. Judgment and thought content normal.   Nursing note and vitals reviewed.        Assessment/Plan   Genesis was seen today for peripheral neuropathy and shortness of breath.    Diagnoses and all orders for this visit:    Bilateral leg pain    B12 deficiency  -     cyanocobalamin injection 1,000 mcg    Shortness of breath  -     XR Chest PA & Lateral               Patient's Body mass index is 25.55 kg/m². BMI is within normal parameters. No follow-up required..    B12 given IM in the office.  X-ray following office visit.  Continue all other current medications.  Follow up as scheduled for routine follow up.  Follow up sooner for problems/concerns.  Patient verbalized understanding and agreement with plan of care.        This document has been electronically signed by TEDDY Zelaya on September 23, 2019 1:14 PM

## 2020-01-16 DIAGNOSIS — E55.9 VITAMIN D DEFICIENCY DISEASE: ICD-10-CM

## 2020-01-16 RX ORDER — ERGOCALCIFEROL 1.25 MG/1
CAPSULE ORAL
Qty: 12 CAPSULE | Refills: 1 | Status: SHIPPED | OUTPATIENT
Start: 2020-01-16 | End: 2020-06-29

## 2020-03-02 ENCOUNTER — OFFICE VISIT (OUTPATIENT)
Dept: FAMILY MEDICINE CLINIC | Facility: CLINIC | Age: 65
End: 2020-03-02

## 2020-03-02 VITALS
TEMPERATURE: 97.8 F | SYSTOLIC BLOOD PRESSURE: 138 MMHG | HEART RATE: 73 BPM | BODY MASS INDEX: 26.55 KG/M2 | HEIGHT: 66 IN | DIASTOLIC BLOOD PRESSURE: 84 MMHG | WEIGHT: 165.2 LBS | OXYGEN SATURATION: 99 %

## 2020-03-02 DIAGNOSIS — H65.02 ACUTE SEROUS OTITIS MEDIA OF LEFT EAR, RECURRENCE NOT SPECIFIED: ICD-10-CM

## 2020-03-02 DIAGNOSIS — R51.9 ACUTE NONINTRACTABLE HEADACHE, UNSPECIFIED HEADACHE TYPE: ICD-10-CM

## 2020-03-02 PROCEDURE — 99213 OFFICE O/P EST LOW 20 MIN: CPT | Performed by: FAMILY MEDICINE

## 2020-03-02 RX ORDER — PREDNISONE 10 MG/1
10 TABLET ORAL SEE ADMIN INSTRUCTIONS
Qty: 17 TABLET | Refills: 0 | Status: SHIPPED | OUTPATIENT
Start: 2020-03-02 | End: 2020-03-17

## 2020-03-02 RX ORDER — AZITHROMYCIN 250 MG/1
TABLET, FILM COATED ORAL
Qty: 6 TABLET | Refills: 0 | Status: SHIPPED | OUTPATIENT
Start: 2020-03-02 | End: 2020-03-09

## 2020-03-02 NOTE — PROGRESS NOTES
Subjective  Pt Dr. Daria Douglass, covering Walk-Ins. Headache, ear pressure  Genesis Mery Salgado is a 65 y.o. overweight white female with SLE, recently stopped Plaquenil. Presents for evaluation of acute health problem.    'Have headache, and earache x 1 week getting worse. Tried OTC meds haven't helped'.    Earache    There is pain in the left ear. This is a recurrent problem. The current episode started in the past 7 days. The problem occurs constantly. The problem has been gradually worsening. There has been no fever. The pain is at a severity of 6/10. The pain is moderate. Associated symptoms include headaches. Pertinent negatives include no diarrhea, neck pain or rash. She has tried acetaminophen for the symptoms. The treatment provided no relief.        The following portions of the patient's history were reviewed and updated as appropriate: allergies, current medications, past family history, past medical history, past social history, past surgical history and problem list.    Review of Systems   Constitutional: Positive for fatigue. Negative for fever.   HENT: Positive for ear pain.    Eyes: Negative.    Respiratory: Negative.  Negative for shortness of breath.    Cardiovascular: Negative.  Negative for chest pain and palpitations.   Gastrointestinal: Negative for diarrhea.   Endocrine: Negative.    Genitourinary: Negative for dysuria.   Musculoskeletal: Negative for joint swelling and neck pain.   Skin: Negative.  Negative for rash.   Allergic/Immunologic: Positive for environmental allergies.   Neurological: Positive for headaches.   Hematological: Negative.    Psychiatric/Behavioral: Negative.        Objective   Physical Exam   Constitutional: She is oriented to person, place, and time. Vital signs are normal. She appears well-developed and well-nourished. No distress.   HENT:   Head: Normocephalic.   Right Ear: External ear normal.   Left Ear: External ear normal.   Nose: Nose normal.   L TM has mucoid  appearance with bulge.  Can't clear ET on L   Eyes: Pupils are equal, round, and reactive to light. EOM are normal.   Neck: Normal range of motion. Neck supple. No JVD present. No thyromegaly present.   Cardiovascular: Normal rate, regular rhythm, normal heart sounds and intact distal pulses. Exam reveals no gallop and no friction rub.   No murmur heard.  Pulmonary/Chest: Effort normal and breath sounds normal. No stridor. No respiratory distress. She has no wheezes. She has no rales. She exhibits no tenderness.   Abdominal: Soft.   Musculoskeletal: Normal range of motion.   Lymphadenopathy:     She has no cervical adenopathy.   Neurological: She is alert and oriented to person, place, and time.   Skin: Skin is warm and dry. No rash noted. She is not diaphoretic. No erythema. No pallor.   Psychiatric: She has a normal mood and affect. Her behavior is normal. Judgment and thought content normal.   Nursing note and vitals reviewed.      Assessment/Plan   Genesis was seen today for ear problem and sinusitis.    Diagnoses and all orders for this visit:    Acute serous otitis media of left ear, recurrence not specified    Acute nonintractable headache, unspecified headache type    Other orders  -     predniSONE (DELTASONE) 10 MG tablet; Take 1 tablet by mouth See Admin Instructions. Take 1 Tab Tid x3 days, Then 1 Tab Bid x 2 days Then 1 Tab QD x4 days,then D/C  -     azithromycin (ZITHROMAX) 250 MG tablet; Take 2 tablets the first day, then 1 tablet daily for 4 days.    Discussed exam, health problem, med indication, Tx plan rationale. Discussed F/U. Also discussed SLE rationale for med, Pt will check with PCP, would like to discuss F/U with Rheumatologist.        This document has been electronically signed by Gary Cruz MD

## 2020-03-05 PROBLEM — R51.9 ACUTE NONINTRACTABLE HEADACHE: Status: ACTIVE | Noted: 2020-03-05

## 2020-03-09 ENCOUNTER — OFFICE VISIT (OUTPATIENT)
Dept: FAMILY MEDICINE CLINIC | Facility: CLINIC | Age: 65
End: 2020-03-09

## 2020-03-09 VITALS
BODY MASS INDEX: 26.6 KG/M2 | RESPIRATION RATE: 20 BRPM | TEMPERATURE: 98.6 F | HEART RATE: 74 BPM | SYSTOLIC BLOOD PRESSURE: 138 MMHG | WEIGHT: 165.5 LBS | DIASTOLIC BLOOD PRESSURE: 80 MMHG | HEIGHT: 66 IN | OXYGEN SATURATION: 97 %

## 2020-03-09 DIAGNOSIS — H65.03 BILATERAL ACUTE SEROUS OTITIS MEDIA, RECURRENCE NOT SPECIFIED: ICD-10-CM

## 2020-03-09 DIAGNOSIS — J01.10 ACUTE FRONTAL SINUSITIS, RECURRENCE NOT SPECIFIED: Primary | ICD-10-CM

## 2020-03-09 DIAGNOSIS — R05.9 COUGH: ICD-10-CM

## 2020-03-09 PROCEDURE — 96372 THER/PROPH/DIAG INJ SC/IM: CPT | Performed by: NURSE PRACTITIONER

## 2020-03-09 PROCEDURE — 99214 OFFICE O/P EST MOD 30 MIN: CPT | Performed by: NURSE PRACTITIONER

## 2020-03-09 RX ORDER — FEXOFENADINE HCL AND PSEUDOEPHEDRINE HCI 180; 240 MG/1; MG/1
1 TABLET, EXTENDED RELEASE ORAL DAILY
Qty: 30 TABLET | Refills: 0 | Status: SHIPPED | OUTPATIENT
Start: 2020-03-09 | End: 2020-03-17

## 2020-03-09 RX ORDER — DEXTROMETHORPHAN HYDROBROMIDE AND PROMETHAZINE HYDROCHLORIDE 15; 6.25 MG/5ML; MG/5ML
5 SYRUP ORAL 4 TIMES DAILY PRN
Qty: 180 ML | Refills: 0 | Status: SHIPPED | OUTPATIENT
Start: 2020-03-09 | End: 2020-03-17

## 2020-03-09 RX ORDER — DEXAMETHASONE SODIUM PHOSPHATE 4 MG/ML
4 INJECTION, SOLUTION INTRA-ARTICULAR; INTRALESIONAL; INTRAMUSCULAR; INTRAVENOUS; SOFT TISSUE ONCE
Status: COMPLETED | OUTPATIENT
Start: 2020-03-09 | End: 2020-03-09

## 2020-03-09 RX ORDER — AMOXICILLIN AND CLAVULANATE POTASSIUM 875; 125 MG/1; MG/1
1 TABLET, FILM COATED ORAL EVERY 12 HOURS SCHEDULED
Qty: 20 TABLET | Refills: 0 | Status: SHIPPED | OUTPATIENT
Start: 2020-03-09 | End: 2020-03-17

## 2020-03-09 RX ORDER — CEFTRIAXONE 1 G/1
1 INJECTION, POWDER, FOR SOLUTION INTRAMUSCULAR; INTRAVENOUS ONCE
Status: COMPLETED | OUTPATIENT
Start: 2020-03-09 | End: 2020-03-09

## 2020-03-09 RX ORDER — LATANOPROST 50 UG/ML
SOLUTION/ DROPS OPHTHALMIC
COMMUNITY
Start: 2020-02-10 | End: 2021-03-23

## 2020-03-09 RX ADMIN — CEFTRIAXONE 1 G: 1 INJECTION, POWDER, FOR SOLUTION INTRAMUSCULAR; INTRAVENOUS at 10:13

## 2020-03-09 RX ADMIN — DEXAMETHASONE SODIUM PHOSPHATE 4 MG: 4 INJECTION, SOLUTION INTRA-ARTICULAR; INTRALESIONAL; INTRAMUSCULAR; INTRAVENOUS; SOFT TISSUE at 10:13

## 2020-03-09 NOTE — PROGRESS NOTES
"Subjective   Genesis Mery Salgado is a 65 y.o. female.     [facility] Visit    PCP: TEDDY Gloria--has appt on 3-16    CC: \"ears, headache, sinus pressure, cough\"    Seen last week on 3-2 and given Zithromax and Prednisone which usually helps, but hasn't been as beneficial this time.  Has been out of her Allegra D for a little while.  No fever.  Has Lupus and having a hard time fighting this off.      Sinus Problem   Chronicity: persistent. The current episode started 1 to 4 weeks ago. The problem has been gradually worsening since onset. There has been no fever. Her pain is at a severity of 8/10 (ears/pressure). Associated symptoms include congestion, coughing, ear pain (mostly right--pressure; some on left), headaches, sinus pressure (frontal) and a sore throat (scratchy, +PND). Pertinent negatives include no chills, diaphoresis, hoarse voice, neck pain, shortness of breath, sneezing or swollen glands. Treatments tried: finished Zithromax 2 days ago, on prednsione taper. The treatment provided no relief.   Cough   This is a new problem. The current episode started in the past 7 days. The problem has been unchanged. The problem occurs every few minutes. Cough characteristics: was dry, now slightly productive of sputum. Associated symptoms include ear congestion, ear pain (mostly right--pressure; some on left), headaches, myalgias, nasal congestion, postnasal drip and a sore throat (scratchy, +PND). Pertinent negatives include no chest pain, chills, fever, heartburn, hemoptysis, rash, rhinorrhea, shortness of breath, sweats, weight loss or wheezing. The symptoms are aggravated by lying down. Treatments tried: prednisone, zithromax. The treatment provided no relief. Her past medical history is significant for environmental allergies.        The following portions of the patient's history were reviewed and updated as appropriate: allergies, current medications, past medical history, past social history, past " "surgical history and problem list.    Review of Systems   Constitutional: Negative for appetite change, chills, diaphoresis, fatigue, fever and unexpected weight loss.   HENT: Positive for congestion, ear pain (mostly right--pressure; some on left), postnasal drip, sinus pressure (frontal) and sore throat (scratchy, +PND). Negative for ear discharge, hoarse voice, rhinorrhea, sneezing, swollen glands and trouble swallowing.    Eyes: Negative.    Respiratory: Positive for cough. Negative for hemoptysis, chest tightness, shortness of breath and wheezing.    Cardiovascular: Negative for chest pain, palpitations and leg swelling.   Gastrointestinal: Negative for abdominal pain, diarrhea, nausea and vomiting.   Genitourinary: Negative for difficulty urinating.   Musculoskeletal: Positive for arthralgias and myalgias. Negative for neck pain.   Skin: Negative for rash.   Allergic/Immunologic: Positive for environmental allergies.   Neurological: Positive for headache. Negative for dizziness.     /80 (BP Location: Left arm, Patient Position: Sitting, Cuff Size: Adult)   Pulse 74   Temp 98.6 °F (37 °C) (Oral)   Resp 20   Ht 167.6 cm (66\")   Wt 75.1 kg (165 lb 8 oz)   SpO2 97%   BMI 26.71 kg/m²     Objective   Physical Exam   Constitutional: She is oriented to person, place, and time. She appears well-developed and well-nourished. No distress.   HENT:   Head: Normocephalic and atraumatic.   Right Ear: Ear canal normal. Tympanic membrane is not erythematous. A middle ear effusion is present.   Left Ear: Ear canal normal. Tympanic membrane is not erythematous. A middle ear effusion is present.   Nose: Mucosal edema and congestion ( mild) present. Right sinus exhibits frontal sinus tenderness. Right sinus exhibits no maxillary sinus tenderness. Left sinus exhibits frontal sinus tenderness. Left sinus exhibits no maxillary sinus tenderness.   Mouth/Throat: Uvula is midline and mucous membranes are normal. Posterior " oropharyngeal erythema ( mild injection with PND) present. No oropharyngeal exudate.   Eyes: Conjunctivae are normal. Right eye exhibits no discharge. Left eye exhibits no discharge.   Neck: Neck supple.   Cardiovascular: Normal rate and regular rhythm.   Pulmonary/Chest: Effort normal and breath sounds normal. She has no wheezes. She has no rales.   Frequent, hacky cough   Lymphadenopathy:     She has no cervical adenopathy.   Neurological: She is alert and oriented to person, place, and time.   Nursing note and vitals reviewed.    No results found for this or any previous visit (from the past 24 hour(s)).  No Images in the past 120 days found..      Assessment/Plan   Genesis was seen today for cough and earache.    Diagnoses and all orders for this visit:    Acute frontal sinusitis, recurrence not specified  -     cefTRIAXone (ROCEPHIN) injection 1 g  -     dexamethasone (DECADRON) injection 4 mg  -     fexofenadine-pseudoephedrine (ALLEGRA-D ALLERGY & CONGESTION) 180-240 MG per 24 hr tablet; Take 1 tablet by mouth Daily.    Bilateral acute serous otitis media, recurrence not specified  -     cefTRIAXone (ROCEPHIN) injection 1 g  -     dexamethasone (DECADRON) injection 4 mg  -     fexofenadine-pseudoephedrine (ALLEGRA-D ALLERGY & CONGESTION) 180-240 MG per 24 hr tablet; Take 1 tablet by mouth Daily.    Cough  -     promethazine-dextromethorphan (PROMETHAZINE-DM) 6.25-15 MG/5ML syrup; Take 5 mL by mouth 4 (Four) Times a Day As Needed for Cough.      Push fluids  Rest  Restart Allegra D--30 day Rx provided--will talk to PCP about 90 day mail in order  Rx for Phenergan DM for cough as needed  Rocephin and Decadron 4 mg IM x 1 in office  Rx for Augmentin provided IF NO IMPROVEMENT with above after 2-3 days  Cool mist humidifier as needed    See PCP or RTC if symptoms persist/worsen  See PCP for routine f/u visit and management of chronic medical conditions      This document has been electronically signed by Yanick ACE  TEDDY Crooks on March 9, 2020 10:13 AM,.

## 2020-03-09 NOTE — PATIENT INSTRUCTIONS
Sinusitis, Adult  Sinusitis is inflammation of your sinuses. Sinuses are hollow spaces in the bones around your face. Your sinuses are located:  · Around your eyes.  · In the middle of your forehead.  · Behind your nose.  · In your cheekbones.  Mucus normally drains out of your sinuses. When your nasal tissues become inflamed or swollen, mucus can become trapped or blocked. This allows bacteria, viruses, and fungi to grow, which leads to infection. Most infections of the sinuses are caused by a virus.  Sinusitis can develop quickly. It can last for up to 4 weeks (acute) or for more than 12 weeks (chronic). Sinusitis often develops after a cold.  What are the causes?  This condition is caused by anything that creates swelling in the sinuses or stops mucus from draining. This includes:  · Allergies.  · Asthma.  · Infection from bacteria or viruses.  · Deformities or blockages in your nose or sinuses.  · Abnormal growths in the nose (nasal polyps).  · Pollutants, such as chemicals or irritants in the air.  · Infection from fungi (rare).  What increases the risk?  You are more likely to develop this condition if you:  · Have a weak body defense system (immune system).  · Do a lot of swimming or diving.  · Overuse nasal sprays.  · Smoke.  What are the signs or symptoms?  The main symptoms of this condition are pain and a feeling of pressure around the affected sinuses. Other symptoms include:  · Stuffy nose or congestion.  · Thick drainage from your nose.  · Swelling and warmth over the affected sinuses.  · Headache.  · Upper toothache.  · A cough that may get worse at night.  · Extra mucus that collects in the throat or the back of the nose (postnasal drip).  · Decreased sense of smell and taste.  · Fatigue.  · A fever.  · Sore throat.  · Bad breath.  How is this diagnosed?  This condition is diagnosed based on:  · Your symptoms.  · Your medical history.  · A physical exam.  · Tests to find out if your condition is  acute or chronic. This may include:  ? Checking your nose for nasal polyps.  ? Viewing your sinuses using a device that has a light (endoscope).  ? Testing for allergies or bacteria.  ? Imaging tests, such as an MRI or CT scan.  In rare cases, a bone biopsy may be done to rule out more serious types of fungal sinus disease.  How is this treated?  Treatment for sinusitis depends on the cause and whether your condition is chronic or acute.  · If caused by a virus, your symptoms should go away on their own within 10 days. You may be given medicines to relieve symptoms. They include:  ? Medicines that shrink swollen nasal passages (topical intranasal decongestants).  ? Medicines that treat allergies (antihistamines).  ? A spray that eases inflammation of the nostrils (topical intranasal corticosteroids).  ? Rinses that help get rid of thick mucus in your nose (nasal saline washes).  · If caused by bacteria, your health care provider may recommend waiting to see if your symptoms improve. Most bacterial infections will get better without antibiotic medicine. You may be given antibiotics if you have:  ? A severe infection.  ? A weak immune system.  · If caused by narrow nasal passages or nasal polyps, you may need to have surgery.  Follow these instructions at home:  Medicines  · Take, use, or apply over-the-counter and prescription medicines only as told by your health care provider. These may include nasal sprays.  · If you were prescribed an antibiotic medicine, take it as told by your health care provider. Do not stop taking the antibiotic even if you start to feel better.  Hydrate and humidify    · Drink enough fluid to keep your urine pale yellow. Staying hydrated will help to thin your mucus.  · Use a cool mist humidifier to keep the humidity level in your home above 50%.  · Inhale steam for 10-15 minutes, 3-4 times a day, or as told by your health care provider. You can do this in the bathroom while a hot shower is  running.  · Limit your exposure to cool or dry air.  Rest  · Rest as much as possible.  · Sleep with your head raised (elevated).  · Make sure you get enough sleep each night.  General instructions    · Apply a warm, moist washcloth to your face 3-4 times a day or as told by your health care provider. This will help with discomfort.  · Wash your hands often with soap and water to reduce your exposure to germs. If soap and water are not available, use hand .  · Do not smoke. Avoid being around people who are smoking (secondhand smoke).  · Keep all follow-up visits as told by your health care provider. This is important.  Contact a health care provider if:  · You have a fever.  · Your symptoms get worse.  · Your symptoms do not improve within 10 days.  Get help right away if:  · You have a severe headache.  · You have persistent vomiting.  · You have severe pain or swelling around your face or eyes.  · You have vision problems.  · You develop confusion.  · Your neck is stiff.  · You have trouble breathing.  Summary  · Sinusitis is soreness and inflammation of your sinuses. Sinuses are hollow spaces in the bones around your face.  · This condition is caused by nasal tissues that become inflamed or swollen. The swelling traps or blocks the flow of mucus. This allows bacteria, viruses, and fungi to grow, which leads to infection.  · If you were prescribed an antibiotic medicine, take it as told by your health care provider. Do not stop taking the antibiotic even if you start to feel better.  · Keep all follow-up visits as told by your health care provider. This is important.  This information is not intended to replace advice given to you by your health care provider. Make sure you discuss any questions you have with your health care provider.  Document Released: 12/18/2006 Document Revised: 05/20/2019 Document Reviewed: 05/20/2019  [employer] Interactive Patient Education © 2020 [employer]

## 2020-03-17 ENCOUNTER — APPOINTMENT (OUTPATIENT)
Dept: LAB | Facility: HOSPITAL | Age: 65
End: 2020-03-17

## 2020-03-17 ENCOUNTER — OFFICE VISIT (OUTPATIENT)
Dept: FAMILY MEDICINE CLINIC | Facility: CLINIC | Age: 65
End: 2020-03-17

## 2020-03-17 VITALS
SYSTOLIC BLOOD PRESSURE: 128 MMHG | HEART RATE: 74 BPM | OXYGEN SATURATION: 98 % | HEIGHT: 66 IN | BODY MASS INDEX: 26.74 KG/M2 | DIASTOLIC BLOOD PRESSURE: 80 MMHG | RESPIRATION RATE: 20 BRPM | WEIGHT: 166.4 LBS | TEMPERATURE: 97.5 F

## 2020-03-17 DIAGNOSIS — Z12.31 ENCOUNTER FOR SCREENING MAMMOGRAM FOR BREAST CANCER: ICD-10-CM

## 2020-03-17 DIAGNOSIS — Z13.1 SCREENING FOR DIABETES MELLITUS: ICD-10-CM

## 2020-03-17 DIAGNOSIS — M94.9 DISORDER OF CARTILAGE, UNSPECIFIED: ICD-10-CM

## 2020-03-17 DIAGNOSIS — H65.93: ICD-10-CM

## 2020-03-17 DIAGNOSIS — Z13.29 SCREENING FOR THYROID DISORDER: ICD-10-CM

## 2020-03-17 DIAGNOSIS — Z13.220 SCREENING FOR LIPOID DISORDERS: ICD-10-CM

## 2020-03-17 DIAGNOSIS — J06.9 ACUTE URI: ICD-10-CM

## 2020-03-17 DIAGNOSIS — M32.9 SYSTEMIC LUPUS ERYTHEMATOSUS, UNSPECIFIED SLE TYPE, UNSPECIFIED ORGAN INVOLVEMENT STATUS (HCC): ICD-10-CM

## 2020-03-17 DIAGNOSIS — R79.9 ABNORMAL FINDING OF BLOOD CHEMISTRY, UNSPECIFIED: ICD-10-CM

## 2020-03-17 DIAGNOSIS — J32.9 RECURRENT SINUS INFECTIONS: Primary | ICD-10-CM

## 2020-03-17 DIAGNOSIS — M81.0 AGE-RELATED OSTEOPOROSIS WITHOUT CURRENT PATHOLOGICAL FRACTURE: ICD-10-CM

## 2020-03-17 LAB
25(OH)D3 SERPL-MCNC: 25.9 NG/ML (ref 30–100)
ALBUMIN SERPL-MCNC: 4.2 G/DL (ref 3.5–5.2)
ALBUMIN/GLOB SERPL: 1.4 G/DL
ALP SERPL-CCNC: 104 U/L (ref 39–117)
ALT SERPL W P-5'-P-CCNC: 14 U/L (ref 1–33)
ANION GAP SERPL CALCULATED.3IONS-SCNC: 12.6 MMOL/L (ref 5–15)
AST SERPL-CCNC: 17 U/L (ref 1–32)
BASOPHILS # BLD AUTO: 0.02 10*3/MM3 (ref 0–0.2)
BASOPHILS NFR BLD AUTO: 0.4 % (ref 0–1.5)
BILIRUB SERPL-MCNC: 0.5 MG/DL (ref 0.2–1.2)
BUN BLD-MCNC: 13 MG/DL (ref 8–23)
BUN/CREAT SERPL: 14.9 (ref 7–25)
CALCIUM SPEC-SCNC: 9.2 MG/DL (ref 8.6–10.5)
CHLORIDE SERPL-SCNC: 99 MMOL/L (ref 98–107)
CO2 SERPL-SCNC: 28.4 MMOL/L (ref 22–29)
CREAT BLD-MCNC: 0.87 MG/DL (ref 0.57–1)
DEPRECATED RDW RBC AUTO: 39.7 FL (ref 37–54)
EOSINOPHIL # BLD AUTO: 0.29 10*3/MM3 (ref 0–0.4)
EOSINOPHIL NFR BLD AUTO: 6.1 % (ref 0.3–6.2)
ERYTHROCYTE [DISTWIDTH] IN BLOOD BY AUTOMATED COUNT: 12.7 % (ref 12.3–15.4)
GFR SERPL CREATININE-BSD FRML MDRD: 65 ML/MIN/1.73
GLOBULIN UR ELPH-MCNC: 3 GM/DL
GLUCOSE BLD-MCNC: 65 MG/DL (ref 65–99)
HBA1C MFR BLD: 6.05 % (ref 4.8–5.6)
HCT VFR BLD AUTO: 40.8 % (ref 34–46.6)
HGB BLD-MCNC: 13.9 G/DL (ref 12–15.9)
IMM GRANULOCYTES # BLD AUTO: 0.01 10*3/MM3 (ref 0–0.05)
IMM GRANULOCYTES NFR BLD AUTO: 0.2 % (ref 0–0.5)
LYMPHOCYTES # BLD AUTO: 1.42 10*3/MM3 (ref 0.7–3.1)
LYMPHOCYTES NFR BLD AUTO: 30.1 % (ref 19.6–45.3)
MCH RBC QN AUTO: 29.6 PG (ref 26.6–33)
MCHC RBC AUTO-ENTMCNC: 34.1 G/DL (ref 31.5–35.7)
MCV RBC AUTO: 87 FL (ref 79–97)
MONOCYTES # BLD AUTO: 0.28 10*3/MM3 (ref 0.1–0.9)
MONOCYTES NFR BLD AUTO: 5.9 % (ref 5–12)
NEUTROPHILS # BLD AUTO: 2.7 10*3/MM3 (ref 1.7–7)
NEUTROPHILS NFR BLD AUTO: 57.3 % (ref 42.7–76)
NRBC BLD AUTO-RTO: 0 /100 WBC (ref 0–0.2)
PLATELET # BLD AUTO: 283 10*3/MM3 (ref 140–450)
PMV BLD AUTO: 11.1 FL (ref 6–12)
POTASSIUM BLD-SCNC: 4.3 MMOL/L (ref 3.5–5.2)
PROT SERPL-MCNC: 7.2 G/DL (ref 6–8.5)
RBC # BLD AUTO: 4.69 10*6/MM3 (ref 3.77–5.28)
SODIUM BLD-SCNC: 140 MMOL/L (ref 136–145)
TSH SERPL DL<=0.05 MIU/L-ACNC: 1.21 UIU/ML (ref 0.27–4.2)
WBC NRBC COR # BLD: 4.72 10*3/MM3 (ref 3.4–10.8)

## 2020-03-17 PROCEDURE — 83036 HEMOGLOBIN GLYCOSYLATED A1C: CPT | Performed by: NURSE PRACTITIONER

## 2020-03-17 PROCEDURE — 82306 VITAMIN D 25 HYDROXY: CPT | Performed by: NURSE PRACTITIONER

## 2020-03-17 PROCEDURE — 84443 ASSAY THYROID STIM HORMONE: CPT | Performed by: NURSE PRACTITIONER

## 2020-03-17 PROCEDURE — 99214 OFFICE O/P EST MOD 30 MIN: CPT | Performed by: NURSE PRACTITIONER

## 2020-03-17 PROCEDURE — 80053 COMPREHEN METABOLIC PANEL: CPT | Performed by: NURSE PRACTITIONER

## 2020-03-17 PROCEDURE — 85025 COMPLETE CBC W/AUTO DIFF WBC: CPT | Performed by: NURSE PRACTITIONER

## 2020-03-17 RX ORDER — LEVOFLOXACIN 750 MG/1
750 TABLET ORAL DAILY
Qty: 5 TABLET | Refills: 0 | Status: SHIPPED | OUTPATIENT
Start: 2020-03-17 | End: 2020-03-24

## 2020-03-17 RX ORDER — FEXOFENADINE HCL AND PSEUDOEPHEDRINE HCI 180; 240 MG/1; MG/1
1 TABLET, EXTENDED RELEASE ORAL DAILY
Qty: 90 TABLET | Refills: 4 | Status: SHIPPED | OUTPATIENT
Start: 2020-03-17 | End: 2021-02-09 | Stop reason: SDUPTHER

## 2020-03-17 RX ORDER — FLUCONAZOLE 150 MG/1
150 TABLET ORAL EVERY OTHER DAY
Qty: 3 TABLET | Refills: 0 | Status: SHIPPED | OUTPATIENT
Start: 2020-03-17 | End: 2020-03-20

## 2020-03-18 RX ORDER — MECLIZINE HYDROCHLORIDE 25 MG/1
TABLET ORAL
Qty: 270 TABLET | Refills: 1 | Status: SHIPPED | OUTPATIENT
Start: 2020-03-18 | End: 2021-03-09 | Stop reason: SDUPTHER

## 2020-03-19 ENCOUNTER — TELEPHONE (OUTPATIENT)
Dept: FAMILY MEDICINE CLINIC | Facility: CLINIC | Age: 65
End: 2020-03-19

## 2020-03-19 NOTE — TELEPHONE ENCOUNTER
----- Message from TEDDY Zelaya sent at 3/19/2020  2:33 PM CDT -----  Vitamin D is low.  Recommend vitamin D3 5000 IU once daily over the counter.  A1C is slightly elevated at 6.05.  Recommend she monitor sugar and carbohydrate intake.

## 2020-03-23 ENCOUNTER — TELEPHONE (OUTPATIENT)
Dept: FAMILY MEDICINE CLINIC | Facility: CLINIC | Age: 65
End: 2020-03-23

## 2020-03-23 DIAGNOSIS — H65.93: ICD-10-CM

## 2020-03-23 DIAGNOSIS — J32.9 RECURRENT SINUS INFECTIONS: ICD-10-CM

## 2020-03-23 RX ORDER — LEVOFLOXACIN 750 MG/1
750 TABLET ORAL DAILY
Qty: 5 TABLET | Refills: 0 | Status: CANCELLED | OUTPATIENT
Start: 2020-03-23

## 2020-03-23 NOTE — TELEPHONE ENCOUNTER
Patient states that she is feeling better but she has finished all of the medication and she still has symptoms. Its clearing it up but she feels like a refill would make it go away all together.     Please contact at [phone]

## 2020-03-24 RX ORDER — PREDNISONE 20 MG/1
20 TABLET ORAL DAILY
Qty: 5 TABLET | Refills: 0 | Status: SHIPPED | OUTPATIENT
Start: 2020-03-24 | End: 2020-03-29

## 2020-03-24 NOTE — TELEPHONE ENCOUNTER
Five days is the maximum recommended for Levaquin 750 mg.  She might benefit from a steroid.  Would she like me to send this in?

## 2020-03-24 NOTE — TELEPHONE ENCOUNTER
Pt was informed of this response from Mrs Douglass about the Levaquin 750 mg. Pt ok with a steroid being sent in to [facility] in [place].

## 2020-04-01 ENCOUNTER — TELEPHONE (OUTPATIENT)
Dept: FAMILY MEDICINE CLINIC | Facility: CLINIC | Age: 65
End: 2020-04-01

## 2020-04-01 RX ORDER — DEXLANSOPRAZOLE 60 MG/1
CAPSULE, DELAYED RELEASE ORAL
Qty: 90 CAPSULE | Refills: 3 | OUTPATIENT
Start: 2020-04-01

## 2020-04-01 RX ORDER — DEXLANSOPRAZOLE 60 MG/1
60 CAPSULE, DELAYED RELEASE ORAL DAILY
Qty: 10 CAPSULE | Refills: 0 | Status: SHIPPED | OUTPATIENT
Start: 2020-04-01 | End: 2020-04-14 | Stop reason: SDUPTHER

## 2020-04-01 NOTE — TELEPHONE ENCOUNTER
Patient called in to request medication be sent to pharmacy.     dexlansoprazole (DEXILANT) 60 MG capsule is being sent through mail order but she is in need of 10 day supply until meds get to her.     Pharmacy confirmed      Please Advise

## 2020-04-14 NOTE — TELEPHONE ENCOUNTER
----- Message from Ghazal Pastor Rep sent at 4/14/2020  4:19 PM CDT -----  Regarding: medication  Contact: [phone]  I have no clue what she was talking about and what medication.  Seems like 10 pills were sent in on April 1st and this is what she is talking about

## 2020-04-14 NOTE — TELEPHONE ENCOUNTER
Spoke to pt and she would like a refill sent to [facility] with a 90 day supply.

## 2020-04-15 ENCOUNTER — TELEPHONE (OUTPATIENT)
Dept: FAMILY MEDICINE CLINIC | Facility: CLINIC | Age: 65
End: 2020-04-15

## 2020-04-15 RX ORDER — DEXLANSOPRAZOLE 60 MG/1
60 CAPSULE, DELAYED RELEASE ORAL DAILY
Qty: 90 CAPSULE | Refills: 1 | Status: SHIPPED | OUTPATIENT
Start: 2020-04-15 | End: 2020-09-14

## 2020-04-15 RX ORDER — DEXLANSOPRAZOLE 60 MG/1
60 CAPSULE, DELAYED RELEASE ORAL DAILY
Qty: 90 CAPSULE | Refills: 1 | Status: SHIPPED | OUTPATIENT
Start: 2020-04-15 | End: 2020-04-15 | Stop reason: SDUPTHER

## 2020-04-15 NOTE — TELEPHONE ENCOUNTER
A rep from the [facility] - [place], [place] - [address] AT Portal to Registered [facility] Sites called in needing to speak with a nurse in regards to the patients dexlansoprazole (DEXILANT) 60 MG capsule medication. Based off the patients profile, it looks like this medication was refilled, sent, and confirmed by the [facility] service this morning but the rep states they have not received anything. I tried warm transferring call to the office but there was no answer. Please call the pharmacy back at the earliest convenience. Phone number provided is [phone] and reference number is [phone]

## 2020-04-16 ENCOUNTER — TELEPHONE (OUTPATIENT)
Dept: FAMILY MEDICINE CLINIC | Facility: CLINIC | Age: 65
End: 2020-04-16

## 2020-04-16 NOTE — TELEPHONE ENCOUNTER
Pt to contact pharmacy to let them know pharmacy should contact us for anything they are needing. Pt will contact the pharmacy to let know.

## 2020-04-16 NOTE — TELEPHONE ENCOUNTER
Patient advised that previously prescribed medication  dexlansoprazole (DEXILANT) 60 MG capsule needs a Prior Authorization.     Requested we contact [facility] to provide.

## 2020-05-27 ENCOUNTER — TELEPHONE (OUTPATIENT)
Dept: FAMILY MEDICINE CLINIC | Facility: CLINIC | Age: 65
End: 2020-05-27

## 2020-05-27 DIAGNOSIS — Z12.31 ENCOUNTER FOR SCREENING MAMMOGRAM FOR BREAST CANCER: ICD-10-CM

## 2020-05-27 NOTE — TELEPHONE ENCOUNTER
Her DXA scan shows osteoporosis.  She is still going to [facility] to get her Prolia injection, correct?

## 2020-05-27 NOTE — TELEPHONE ENCOUNTER
Pt was informed of this result. Pt stated that she has not had a shot in over 6 months but needs our office to call and schedule it.

## 2020-05-28 DIAGNOSIS — M81.0 AGE-RELATED OSTEOPOROSIS WITHOUT CURRENT PATHOLOGICAL FRACTURE: ICD-10-CM

## 2020-05-28 NOTE — TELEPHONE ENCOUNTER
Do you know who I need to call ? I asked Ms Salgado where at [facility] was she receiving the injection ? All she could tell me was that it was on the 8th floor.

## 2020-05-28 NOTE — TELEPHONE ENCOUNTER
Order for Prolia injections faxed to [facility] @ [phone].

## 2020-05-29 ENCOUNTER — TELEPHONE (OUTPATIENT)
Dept: FAMILY MEDICINE CLINIC | Facility: CLINIC | Age: 65
End: 2020-05-29

## 2020-05-29 NOTE — TELEPHONE ENCOUNTER
----- Message from TEDDY Zelaya sent at 5/27/2020  2:21 PM CDT -----  Normal mammogram.  Repeat in 1 year.

## 2020-06-05 ENCOUNTER — APPOINTMENT (OUTPATIENT)
Dept: GENERAL RADIOLOGY | Facility: HOSPITAL | Age: 65
End: 2020-06-05

## 2020-06-05 ENCOUNTER — TELEPHONE (OUTPATIENT)
Dept: FAMILY MEDICINE CLINIC | Facility: CLINIC | Age: 65
End: 2020-06-05

## 2020-06-05 ENCOUNTER — HOSPITAL ENCOUNTER (EMERGENCY)
Facility: HOSPITAL | Age: 65
Discharge: HOME | End: 2020-06-05
Attending: FAMILY MEDICINE | Admitting: FAMILY MEDICINE

## 2020-06-05 VITALS
RESPIRATION RATE: 18 BRPM | DIASTOLIC BLOOD PRESSURE: 77 MMHG | TEMPERATURE: 99.1 F | BODY MASS INDEX: 25.88 KG/M2 | HEIGHT: 66 IN | OXYGEN SATURATION: 98 % | HEART RATE: 86 BPM | WEIGHT: 161 LBS | SYSTOLIC BLOOD PRESSURE: 133 MMHG

## 2020-06-05 DIAGNOSIS — M79.672 ACUTE FOOT PAIN, LEFT: Primary | ICD-10-CM

## 2020-06-05 PROCEDURE — 73630 X-RAY EXAM OF FOOT: CPT

## 2020-06-05 PROCEDURE — 99283 EMERGENCY DEPT VISIT LOW MDM: CPT

## 2020-06-05 NOTE — ED PROVIDER NOTES
Subjective   Patient presents to emergency department for left great toe and foot injury yesterday after tripping over an object.  Notes pain mostly over (L) MTPJ.  Denies any numbness/tinlging/weakness.  States she has a history of frequent fractures.        History provided by:  Patient   used: No    Foot Injury   Location:  Foot  Time since incident:  1 day  Injury: yes    Mechanism of injury comment:  Tripped over object  Foot location:  L foot  Associated symptoms: no back pain and no fever        Review of Systems   Constitutional: Negative for chills and fever.   HENT: Negative for sore throat and trouble swallowing.    Eyes: Negative for visual disturbance.   Respiratory: Negative for shortness of breath and wheezing.    Cardiovascular: Negative for chest pain.   Gastrointestinal: Negative for abdominal pain, nausea and vomiting.   Genitourinary: Negative for dysuria and flank pain.   Musculoskeletal: Positive for arthralgias and joint swelling. Negative for back pain.   Skin: Negative for color change.   Allergic/Immunologic: Negative for immunocompromised state.   Neurological: Negative for syncope and weakness.   Hematological: Does not bruise/bleed easily.   Psychiatric/Behavioral: Negative for confusion.       Past Medical History:   Diagnosis Date   • Adie's syndrome    • Cancer (CMS/HCC)     colon cancer   • Colon cancer (CMS/HCC)    • Hiatal hernia    • Lupus (CMS/HCC)    • Osteoporosis    • Ulcerative colitis (CMS/HCC)        Allergies   Allergen Reactions   • Latex Hives   • Neurontin [Gabapentin] Other (See Comments)     Made her feel not in control of her body       Past Surgical History:   Procedure Laterality Date   • BACK SURGERY     • BILATERAL BREAST REDUCTION     • CATARACT EXTRACTION W/ INTRAOCULAR LENS IMPLANT     • CHOLECYSTECTOMY     • COLON RESECTION     • COLON SURGERY     • FIXATION KYPHOPLASTY THORACIC SPINE      T12   • LUMBAR DISCECTOMY     • TRIGGER FINGER  "RELEASE Right        Family History   Problem Relation Age of Onset   • Alzheimer's disease Mother    • Lung cancer Father    • Brain cancer Father    • Colon cancer Paternal Uncle    • Esophageal cancer Paternal Uncle        Social History     Socioeconomic History   • Marital status:      Spouse name: Not on file   • Number of children: Not on file   • Years of education: Not on file   • Highest education level: Not on file   Tobacco Use   • Smoking status: Never Smoker   • Smokeless tobacco: Never Used   Substance and Sexual Activity   • Alcohol use: No   • Drug use: Defer   • Sexual activity: Defer           Objective      /77 (BP Location: Left arm, Patient Position: Sitting)   Pulse 86   Temp 99.1 °F (37.3 °C) (Skin)   Resp 18   Ht 167.6 cm (66\")   Wt 73 kg (161 lb)   SpO2 98%   BMI 25.99 kg/m²     Physical Exam   Constitutional: She appears well-developed and well-nourished.   HENT:   Head: Normocephalic and atraumatic.   Eyes: Conjunctivae are normal.   Cardiovascular: Normal rate, regular rhythm, normal heart sounds and intact distal pulses.   Pulmonary/Chest: Effort normal and breath sounds normal. No respiratory distress. She has no wheezes.   Musculoskeletal: Normal range of motion. She exhibits edema and tenderness. She exhibits no deformity.   TTP/edema/erythema (L) 1st MTPJ.  TTP over (L) dorsal metatarsals.   Neurological: She is alert.   Skin: Capillary refill takes less than 2 seconds. There is erythema.   Psychiatric: She has a normal mood and affect. Her behavior is normal. Thought content normal.   Nursing note and vitals reviewed.      Procedures           ED Course      Results for orders placed or performed in visit on 03/17/20   Comprehensive Metabolic Panel   Result Value Ref Range    Glucose 65 65 - 99 mg/dL    BUN 13 8 - 23 mg/dL    Creatinine 0.87 0.57 - 1.00 mg/dL    Sodium 140 136 - 145 mmol/L    Potassium 4.3 3.5 - 5.2 mmol/L    Chloride 99 98 - 107 mmol/L    CO2 " 28.4 22.0 - 29.0 mmol/L    Calcium 9.2 8.6 - 10.5 mg/dL    Total Protein 7.2 6.0 - 8.5 g/dL    Albumin 4.20 3.50 - 5.20 g/dL    ALT (SGPT) 14 1 - 33 U/L    AST (SGOT) 17 1 - 32 U/L    Alkaline Phosphatase 104 39 - 117 U/L    Total Bilirubin 0.5 0.2 - 1.2 mg/dL    eGFR Non African Amer 65 >60 mL/min/1.73    Globulin 3.0 gm/dL    A/G Ratio 1.4 g/dL    BUN/Creatinine Ratio 14.9 7.0 - 25.0    Anion Gap 12.6 5.0 - 15.0 mmol/L   Hemoglobin A1c   Result Value Ref Range    Hemoglobin A1C 6.05 (H) 4.80 - 5.60 %   TSH   Result Value Ref Range    TSH 1.210 0.270 - 4.200 uIU/mL   Vitamin D 25 Hydroxy   Result Value Ref Range    25 Hydroxy, Vitamin D 25.9 (L) 30.0 - 100.0 ng/ml   CBC Auto Differential   Result Value Ref Range    WBC 4.72 3.40 - 10.80 10*3/mm3    RBC 4.69 3.77 - 5.28 10*6/mm3    Hemoglobin 13.9 12.0 - 15.9 g/dL    Hematocrit 40.8 34.0 - 46.6 %    MCV 87.0 79.0 - 97.0 fL    MCH 29.6 26.6 - 33.0 pg    MCHC 34.1 31.5 - 35.7 g/dL    RDW 12.7 12.3 - 15.4 %    RDW-SD 39.7 37.0 - 54.0 fl    MPV 11.1 6.0 - 12.0 fL    Platelets 283 140 - 450 10*3/mm3    Neutrophil % 57.3 42.7 - 76.0 %    Lymphocyte % 30.1 19.6 - 45.3 %    Monocyte % 5.9 5.0 - 12.0 %    Eosinophil % 6.1 0.3 - 6.2 %    Basophil % 0.4 0.0 - 1.5 %    Immature Grans % 0.2 0.0 - 0.5 %    Neutrophils, Absolute 2.70 1.70 - 7.00 10*3/mm3    Lymphocytes, Absolute 1.42 0.70 - 3.10 10*3/mm3    Monocytes, Absolute 0.28 0.10 - 0.90 10*3/mm3    Eosinophils, Absolute 0.29 0.00 - 0.40 10*3/mm3    Basophils, Absolute 0.02 0.00 - 0.20 10*3/mm3    Immature Grans, Absolute 0.01 0.00 - 0.05 10*3/mm3    nRBC 0.0 0.0 - 0.2 /100 WBC     Xr Foot 3+ View Left    Result Date: 6/5/2020  Narrative: PROCEDURE:  Left  foot 3 views REASON FOR EXAM: Injury unspecified FINDINGS: Bony and  soft tissue structures of the foot are normal. There is no evidence of fracture-dislocation..     Impression: Negative left foot. Electronically signed by:  Kendrick Dalton MD  6/5/2020 12:28 PM CDT  Workstation: [id number]    Discussed results with patient.  Gave educational materials.  Advised close follow up with PCP.  Return to emergency department for new or worsening symptoms.                                         MDM    Final diagnoses:   Acute foot pain, left            Marshall Cartagena PA-C  06/05/20 1249

## 2020-06-05 NOTE — TELEPHONE ENCOUNTER
PT called, states she believes she's broken or fractured or broken her foot. PT has osteoporosis, states she's familiar with the feeling. She's requesting to be worked in today, advised our walk in clinic is closed until further notice and Daria Douglass is out of the office today. PT stated understanding, but would still like to possibly be worked in somewhere. Recommended ED or UC facility, PT stated understanding, but would like to hear from clinical staff before proceeding.     Please advise/call Genesis back ASAP: [phone].

## 2020-06-05 NOTE — ED NOTES
Patient was placed in face mask during first look triage.  Patient was wearing a face mask throughout encounter.  I wore personal protective equipment throughout the encounter.  Hand hygiene was performed before and after patient encounter.       Deandra Stack RN  06/05/20 1158

## 2020-06-05 NOTE — ED NOTES
HPI - Stated Reason for Visit: left great toe and foot injury sustained by tripping over something, yesterday afternoon        Deanrda Stack RN  06/05/20 7348

## 2020-06-17 ENCOUNTER — OFFICE VISIT (OUTPATIENT)
Dept: FAMILY MEDICINE CLINIC | Facility: CLINIC | Age: 65
End: 2020-06-17

## 2020-06-17 VITALS
SYSTOLIC BLOOD PRESSURE: 120 MMHG | DIASTOLIC BLOOD PRESSURE: 78 MMHG | RESPIRATION RATE: 20 BRPM | OXYGEN SATURATION: 97 % | HEART RATE: 72 BPM | BODY MASS INDEX: 26.45 KG/M2 | HEIGHT: 66 IN | TEMPERATURE: 97.6 F | WEIGHT: 164.6 LBS

## 2020-06-17 DIAGNOSIS — Z00.00 MEDICARE ANNUAL WELLNESS VISIT, INITIAL: Primary | ICD-10-CM

## 2020-06-17 PROCEDURE — G0438 PPPS, INITIAL VISIT: HCPCS | Performed by: NURSE PRACTITIONER

## 2020-06-17 NOTE — PROGRESS NOTES
The ABCs of the Annual Wellness Visit  Initial Medicare Wellness Visit    Chief Complaint   Patient presents with   • Annual Exam     Medicare Wellness       Subjective   History of Present Illness:  Genesis Salgado is a 65 y.o. female who presents for an Initial Medicare Wellness Visit.    HEALTH RISK ASSESSMENT    Recent Hospitalizations:  No hospitalization(s) within the last year.    Current Medical Providers:  Patient Care Team:  Daria Douglass APRN as PCP - General (Family Medicine)    Smoking Status:  Social History     Tobacco Use   Smoking Status Never Smoker   Smokeless Tobacco Never Used       Alcohol Consumption:  Social History     Substance and Sexual Activity   Alcohol Use No       Depression Screen:   PHQ-2/PHQ-9 Depression Screening 6/17/2020   Little interest or pleasure in doing things 0   Feeling down, depressed, or hopeless 0   Total Score 0       Fall Risk Screen:  STEADI Fall Risk Assessment was completed, and patient is at HIGH risk for falls. Assessment completed on:6/17/2020    Health Habits and Functional and Cognitive Screening:  Functional & Cognitive Status 6/17/2020   Do you have difficulty preparing food and eating? No   Do you have difficulty bathing yourself, getting dressed or grooming yourself? No   Do you have difficulty using the toilet? No   Do you have difficulty moving around from place to place? No   Do you have trouble with steps or getting out of a bed or a chair? No   Current Diet Well Balanced Diet   Dental Exam Up to date   Eye Exam Up to date   Exercise (times per week) 3 times per week   Current Exercise Activities Include Dancing   Do you need help using the phone?  No   Are you deaf or do you have serious difficulty hearing?  No   Do you need help with transportation? No   Do you need help shopping? No   Do you need help preparing meals?  No   Do you need help with housework?  No   Do you need help with laundry? No   Do you need help taking your medications? No      Do you need help managing money? No   Do you ever drive or ride in a car without wearing a seat belt? No   Have you felt unusual stress, anger or loneliness in the last month? No   Who do you live with? Spouse   If you need help, do you have trouble finding someone available to you? No   Have you been bothered in the last four weeks by sexual problems? No   Do you have difficulty concentrating, remembering or making decisions? No         Does the patient have evidence of cognitive impairment? No    Asprin use counseling:Does not need ASA (and currently is not on it)    Age-appropriate Screening Schedule:  Refer to the list below for future screening recommendations based on patient's age, sex and/or medical conditions. Orders for these recommended tests are listed in the plan section. The patient has been provided with a written plan.    Health Maintenance   Topic Date Due   • TDAP/TD VACCINES (1 - Tdap) 06/17/2020 (Originally 1/21/1966)   • ZOSTER VACCINE (1 of 2) 06/17/2020 (Originally 1/21/2003)   • INFLUENZA VACCINE  08/01/2020   • COLONOSCOPY  08/28/2020   • PAP SMEAR  02/22/2021   • MAMMOGRAM  05/21/2022   • DXA SCAN  05/21/2022          The following portions of the patient's history were reviewed and updated as appropriate: allergies, current medications, past family history, past medical history, past social history, past surgical history and problem list.    Outpatient Medications Prior to Visit   Medication Sig Dispense Refill   • dexlansoprazole (DEXILANT) 60 MG capsule Take 1 capsule by mouth Daily. 90 capsule 1   • fexofenadine-pseudoephedrine (ALLEGRA-D 24) 180-240 MG per 24 hr tablet Take 1 tablet by mouth Daily. 90 tablet 4   • hydroxychloroquine (PLAQUENIL) 200 MG tablet TAKE 1 TABLET DAILY 90 tablet 0   • latanoprost (XALATAN) 0.005 % ophthalmic solution      • meclizine (ANTIVERT) 25 MG tablet TAKE 1 TABLET 3 TIMES A DAYAS NEEDED FOR DIZZINESS 270 tablet 1   • vitamin D (ERGOCALCIFEROL) 1.25 MG  "(55015 UT) capsule capsule TAKE 1 CAPSULE EVERY 7 DAYS 12 capsule 1     No facility-administered medications prior to visit.        Patient Active Problem List   Diagnosis   • Systemic lupus erythematosus (CMS/[facility])   • Vitamin D deficiency disease   • History of osteoporosis   • Acute serous otitis media of left ear   • Seasonal allergic rhinitis   • Hoarseness   • Acute nonintractable headache       Advanced Care Planning:  ACP discussion was held with the patient during this visit. Patient does not have an advance directive, information provided.    Review of Systems    Compared to one year ago, the patient feels her physical health is the same.  Compared to one year ago, the patient feels her mental health is the same.    Reviewed chart for potential of high risk medication in the elderly: yes  Reviewed chart for potential of harmful drug interactions in the elderly:yes    Objective         Vitals:    06/17/20 1011   BP: 120/78   BP Location: Left arm   Patient Position: Sitting   Cuff Size: Adult   Pulse: 72   Resp: 20   Temp: 97.6 °F (36.4 °C)   TempSrc: Temporal   SpO2: 97%   Weight: 74.7 kg (164 lb 9.6 oz)   Height: 167.6 cm (66\")   PainSc: 0-No pain       Body mass index is 26.57 kg/m².  Discussed the patient's BMI with her. The BMI is above average; BMI management plan is completed.    Physical Exam          Assessment/Plan   Medicare Risks and Personalized Health Plan  CMS Preventative Services Quick Reference  Advance Directive Discussion  Alcohol Misuse  Breast Cancer/Mammogram Screening  Cardiovascular risk  Dementia/Memory   Depression/Dysphoria  Fall Risk  Inadequate Social Support, Isolation, Loneliness, Lack of Transportation, Financial Difficulties, or Caregiver Stress   Inactivity/Sedentary  Obesity/Overweight   Osteoprorosis Risk  Polypharmacy    The above risks/problems have been discussed with the patient.  Pertinent information has been shared with the patient in the After Visit " Summary.  Follow up plans and orders are seen below in the Assessment/Plan Section.    Diagnoses and all orders for this visit:    1. Medicare annual wellness visit, initial (Primary)      Follow Up:  Return in about 1 year (around 6/17/2021) for Medicare Wellness.   An action plan was developed by the patient and reviewed in the office.  See attached documents.  An After Visit Summary and PPPS were given to the patient.          This document has been electronically signed by TEDDY Zelaya on June 17, 2020 10:35

## 2020-06-17 NOTE — PATIENT INSTRUCTIONS
Medicare Wellness  Personal Prevention Plan of Service     Date of Office Visit:  2020  Encounter Provider:  TEDDY Zelaya  Place of Service:  [facility]  Patient Name: Genesis Salgado  :  1955    As part of the Medicare Wellness portion of your visit today, we are providing you with this personalized preventive plan of services (PPPS). This plan is based upon recommendations of the [place] Preventive Services Task Force (USPSTF) and the Advisory Committee on Immunization Practices (ACIP).    This lists the preventive care services that should be considered, and provides dates of when you are due. Items listed as completed are up-to-date and do not require any further intervention.    Health Maintenance   Topic Date Due   • TDAP/TD VACCINES (1 - Tdap) 1964   • ZOSTER VACCINE (1 of 2) 2003   • MEDICARE ANNUAL WELLNESS  2017   • Pneumococcal Vaccine Once at 65 Years Old  2018   • INFLUENZA VACCINE  2020   • COLONOSCOPY  2020   • PAP SMEAR  2021   • MAMMOGRAM  2022   • DXA SCAN  2022   • HEPATITIS C SCREENING  Completed       No orders of the defined types were placed in this encounter.      Return in about 1 year (around 2021) for Medicare Wellness.

## 2020-06-27 DIAGNOSIS — E55.9 VITAMIN D DEFICIENCY DISEASE: ICD-10-CM

## 2020-06-29 RX ORDER — ERGOCALCIFEROL 1.25 MG/1
CAPSULE ORAL
Qty: 12 CAPSULE | Refills: 1 | Status: SHIPPED | OUTPATIENT
Start: 2020-06-29 | End: 2020-12-09

## 2020-07-22 ENCOUNTER — HOSPITAL ENCOUNTER (EMERGENCY)
Facility: HOSPITAL | Age: 65
Discharge: HOME | End: 2020-07-22
Attending: EMERGENCY MEDICINE | Admitting: EMERGENCY MEDICINE

## 2020-07-22 VITALS
RESPIRATION RATE: 18 BRPM | WEIGHT: 174.1 LBS | SYSTOLIC BLOOD PRESSURE: 141 MMHG | HEART RATE: 63 BPM | TEMPERATURE: 97.2 F | DIASTOLIC BLOOD PRESSURE: 72 MMHG | HEIGHT: 66 IN | OXYGEN SATURATION: 97 % | BODY MASS INDEX: 27.98 KG/M2

## 2020-07-22 DIAGNOSIS — N28.9 ACUTE RENAL INSUFFICIENCY: ICD-10-CM

## 2020-07-22 DIAGNOSIS — T67.2XXA HEAT CRAMPS, INITIAL ENCOUNTER: ICD-10-CM

## 2020-07-22 DIAGNOSIS — E86.0 DEHYDRATION, MODERATE: Primary | ICD-10-CM

## 2020-07-22 DIAGNOSIS — E87.1 HYPONATREMIA: ICD-10-CM

## 2020-07-22 LAB
ALBUMIN SERPL-MCNC: 4.7 G/DL (ref 3.5–5.2)
ALBUMIN/GLOB SERPL: 1.7 G/DL
ALP SERPL-CCNC: 113 U/L (ref 39–117)
ALT SERPL W P-5'-P-CCNC: 9 U/L (ref 1–33)
ANION GAP SERPL CALCULATED.3IONS-SCNC: 11 MMOL/L (ref 5–15)
AST SERPL-CCNC: 17 U/L (ref 1–32)
BASOPHILS # BLD AUTO: 0.03 10*3/MM3 (ref 0–0.2)
BASOPHILS NFR BLD AUTO: 0.4 % (ref 0–1.5)
BILIRUB SERPL-MCNC: 0.5 MG/DL (ref 0–1.2)
BILIRUB UR QL STRIP: NEGATIVE
BUN SERPL-MCNC: 24 MG/DL (ref 8–23)
BUN/CREAT SERPL: 23.8 (ref 7–25)
CALCIUM SPEC-SCNC: 8.7 MG/DL (ref 8.6–10.5)
CHLORIDE SERPL-SCNC: 98 MMOL/L (ref 98–107)
CK SERPL-CCNC: 102 U/L (ref 20–180)
CLARITY UR: CLEAR
CO2 SERPL-SCNC: 25 MMOL/L (ref 22–29)
COLOR UR: YELLOW
CREAT SERPL-MCNC: 1.01 MG/DL (ref 0.57–1)
DEPRECATED RDW RBC AUTO: 41.4 FL (ref 37–54)
EOSINOPHIL # BLD AUTO: 0.01 10*3/MM3 (ref 0–0.4)
EOSINOPHIL NFR BLD AUTO: 0.1 % (ref 0.3–6.2)
ERYTHROCYTE [DISTWIDTH] IN BLOOD BY AUTOMATED COUNT: 12.7 % (ref 12.3–15.4)
GFR SERPL CREATININE-BSD FRML MDRD: 55 ML/MIN/1.73
GLOBULIN UR ELPH-MCNC: 2.7 GM/DL
GLUCOSE SERPL-MCNC: 111 MG/DL (ref 65–99)
GLUCOSE UR STRIP-MCNC: NEGATIVE MG/DL
HCT VFR BLD AUTO: 40.7 % (ref 34–46.6)
HGB BLD-MCNC: 13.9 G/DL (ref 12–15.9)
HGB UR QL STRIP.AUTO: NEGATIVE
HOLD SPECIMEN: NORMAL
IMM GRANULOCYTES # BLD AUTO: 0.02 10*3/MM3 (ref 0–0.05)
IMM GRANULOCYTES NFR BLD AUTO: 0.3 % (ref 0–0.5)
KETONES UR QL STRIP: NEGATIVE
LEUKOCYTE ESTERASE UR QL STRIP.AUTO: NEGATIVE
LYMPHOCYTES # BLD AUTO: 0.98 10*3/MM3 (ref 0.7–3.1)
LYMPHOCYTES NFR BLD AUTO: 13.4 % (ref 19.6–45.3)
MAGNESIUM SERPL-MCNC: 2.3 MG/DL (ref 1.6–2.4)
MCH RBC QN AUTO: 30.4 PG (ref 26.6–33)
MCHC RBC AUTO-ENTMCNC: 34.2 G/DL (ref 31.5–35.7)
MCV RBC AUTO: 89.1 FL (ref 79–97)
MONOCYTES # BLD AUTO: 0.45 10*3/MM3 (ref 0.1–0.9)
MONOCYTES NFR BLD AUTO: 6.2 % (ref 5–12)
NEUTROPHILS NFR BLD AUTO: 5.82 10*3/MM3 (ref 1.7–7)
NEUTROPHILS NFR BLD AUTO: 79.6 % (ref 42.7–76)
NITRITE UR QL STRIP: NEGATIVE
NRBC BLD AUTO-RTO: 0 /100 WBC (ref 0–0.2)
PH UR STRIP.AUTO: 5.5 [PH] (ref 5–9)
PLATELET # BLD AUTO: 286 10*3/MM3 (ref 140–450)
PMV BLD AUTO: 9.8 FL (ref 6–12)
POTASSIUM SERPL-SCNC: 3.4 MMOL/L (ref 3.5–5.2)
PROT SERPL-MCNC: 7.4 G/DL (ref 6–8.5)
PROT UR QL STRIP: ABNORMAL
RBC # BLD AUTO: 4.57 10*6/MM3 (ref 3.77–5.28)
SODIUM SERPL-SCNC: 134 MMOL/L (ref 136–145)
SP GR UR STRIP: 1.03 (ref 1–1.03)
TROPONIN T SERPL-MCNC: <0.01 NG/ML (ref 0–0.03)
UROBILINOGEN UR QL STRIP: ABNORMAL
WBC # BLD AUTO: 7.31 10*3/MM3 (ref 3.4–10.8)
WHOLE BLOOD HOLD SPECIMEN: NORMAL

## 2020-07-22 PROCEDURE — 93005 ELECTROCARDIOGRAM TRACING: CPT | Performed by: EMERGENCY MEDICINE

## 2020-07-22 PROCEDURE — 99284 EMERGENCY DEPT VISIT MOD MDM: CPT

## 2020-07-22 PROCEDURE — 84484 ASSAY OF TROPONIN QUANT: CPT | Performed by: EMERGENCY MEDICINE

## 2020-07-22 PROCEDURE — 83735 ASSAY OF MAGNESIUM: CPT | Performed by: EMERGENCY MEDICINE

## 2020-07-22 PROCEDURE — 85025 COMPLETE CBC W/AUTO DIFF WBC: CPT | Performed by: EMERGENCY MEDICINE

## 2020-07-22 PROCEDURE — 93010 ELECTROCARDIOGRAM REPORT: CPT | Performed by: INTERNAL MEDICINE

## 2020-07-22 PROCEDURE — 81003 URINALYSIS AUTO W/O SCOPE: CPT | Performed by: EMERGENCY MEDICINE

## 2020-07-22 PROCEDURE — 82550 ASSAY OF CK (CPK): CPT | Performed by: EMERGENCY MEDICINE

## 2020-07-22 PROCEDURE — 80053 COMPREHEN METABOLIC PANEL: CPT | Performed by: EMERGENCY MEDICINE

## 2020-07-22 RX ORDER — SODIUM CHLORIDE 9 MG/ML
125 INJECTION, SOLUTION INTRAVENOUS CONTINUOUS
Status: DISCONTINUED | OUTPATIENT
Start: 2020-07-22 | End: 2020-07-22 | Stop reason: HOSPADM

## 2020-07-22 RX ORDER — SODIUM CHLORIDE 0.9 % (FLUSH) 0.9 %
10 SYRINGE (ML) INJECTION AS NEEDED
Status: DISCONTINUED | OUTPATIENT
Start: 2020-07-22 | End: 2020-07-22 | Stop reason: HOSPADM

## 2020-07-22 RX ADMIN — SODIUM CHLORIDE 1000 ML: 9 INJECTION, SOLUTION INTRAVENOUS at 09:26

## 2020-07-22 NOTE — ED PROVIDER NOTES
Subjective   Patient presents emergency department complaint of generalized weakness and possible dehydration after being outside yesterday and doing lawn work for several hours.  Patient feels like she became dehydrated.  Patient was having spasm and cramping in her lower extremities and was having difficulty walking yesterday, attempted to hydrate with Gatorade but continues to have symptoms today with the same issues.  Patient notes that she has been working outside now 2 days in a row, with the symptoms becoming worse yesterday.  Denies any fevers or chills, no infectious exposures.  No diarrhea.  No vomiting.  Patient's long-term medical history includes lupus.  Denies any focal neurologic deficits.          Review of Systems   Constitutional: Positive for fatigue. Negative for appetite change, chills and fever.   HENT: Negative.  Negative for congestion.    Eyes: Negative.  Negative for photophobia and visual disturbance.   Respiratory: Negative.  Negative for cough, chest tightness and shortness of breath.    Cardiovascular: Negative.  Negative for chest pain and palpitations.   Gastrointestinal: Negative.  Negative for abdominal pain, constipation, diarrhea, nausea and vomiting.   Endocrine: Negative.    Genitourinary: Negative.  Negative for decreased urine volume, dysuria, flank pain and hematuria.   Musculoskeletal: Negative.  Negative for arthralgias, back pain, myalgias, neck pain and neck stiffness.   Skin: Negative.  Negative for pallor.   Neurological: Positive for weakness. Negative for dizziness, syncope, light-headedness, numbness and headaches.   Psychiatric/Behavioral: Negative.  Negative for confusion and suicidal ideas. The patient is not nervous/anxious.    All other systems reviewed and are negative.      Past Medical History:   Diagnosis Date   • Adie's syndrome    • Cancer (CMS/HCC)     colon cancer   • Colon cancer (CMS/HCC)    • Hiatal hernia    • Lupus (CMS/HCC)    • Osteoporosis    •  Ulcerative colitis (CMS/HCC)        Allergies   Allergen Reactions   • Latex Hives   • Neurontin [Gabapentin] Other (See Comments)     Made her feel not in control of her body       Past Surgical History:   Procedure Laterality Date   • BACK SURGERY     • BILATERAL BREAST REDUCTION     • CATARACT EXTRACTION W/ INTRAOCULAR LENS IMPLANT     • CHOLECYSTECTOMY     • COLON RESECTION     • COLON SURGERY     • FIXATION KYPHOPLASTY THORACIC SPINE      T12   • LUMBAR DISCECTOMY     • TRIGGER FINGER RELEASE Right        Family History   Problem Relation Age of Onset   • Alzheimer's disease Mother    • Lung cancer Father    • Brain cancer Father    • Colon cancer Paternal Uncle    • Esophageal cancer Paternal Uncle        Social History     Socioeconomic History   • Marital status:      Spouse name: Not on file   • Number of children: Not on file   • Years of education: Not on file   • Highest education level: Not on file   Tobacco Use   • Smoking status: Never Smoker   • Smokeless tobacco: Never Used   Substance and Sexual Activity   • Alcohol use: No   • Drug use: Defer   • Sexual activity: Defer           Objective   Physical Exam   Constitutional: She is oriented to person, place, and time. She appears well-developed and well-nourished. No distress.   HENT:   Head: Normocephalic and atraumatic.   Nose: Nose normal.   Mouth/Throat: Oropharynx is clear and moist.   Eyes: Conjunctivae and EOM are normal. No scleral icterus.   Neck: Normal range of motion. Neck supple. No JVD present.   Cardiovascular: Normal rate, regular rhythm, normal heart sounds and intact distal pulses. Exam reveals no gallop and no friction rub.   No murmur heard.  Pulmonary/Chest: Effort normal. No respiratory distress. She has no wheezes. She has no rales. She exhibits no tenderness.   Abdominal: Soft. She exhibits no distension and no mass. There is no tenderness. There is no rebound and no guarding.   Musculoskeletal: Normal range of motion.  She exhibits no edema, tenderness or deformity.   Lymphadenopathy:     She has no cervical adenopathy.   Neurological: She is alert and oriented to person, place, and time. No cranial nerve deficit. She exhibits normal muscle tone.   Metric upper and lower extremity strength, full range of motion, no pain with range of motion.   Skin: Skin is warm and dry. Capillary refill takes less than 2 seconds. No rash noted. She is not diaphoretic. No erythema. No pallor.   Psychiatric: She has a normal mood and affect. Her behavior is normal. Judgment and thought content normal.   Nursing note and vitals reviewed.      Procedures           ED Course                                 Labs Reviewed   COMPREHENSIVE METABOLIC PANEL - Abnormal; Notable for the following components:       Result Value    Glucose 111 (*)     BUN 24 (*)     Creatinine 1.01 (*)     Sodium 134 (*)     Potassium 3.4 (*)     eGFR Non  Amer 55 (*)     All other components within normal limits    Narrative:     GFR Normal >60  Chronic Kidney Disease <60  Kidney Failure <15     CBC WITH AUTO DIFFERENTIAL - Abnormal; Notable for the following components:    Neutrophil % 79.6 (*)     Lymphocyte % 13.4 (*)     Eosinophil % 0.1 (*)     All other components within normal limits   URINALYSIS W/ MICROSCOPIC IF INDICATED (NO CULTURE) - Abnormal; Notable for the following components:    Protein, UA Trace (*)     All other components within normal limits    Narrative:     Urine microscopic not indicated.   CK - Normal   MAGNESIUM - Normal   TROPONIN (IN-HOUSE) - Normal    Narrative:     Troponin T Reference Range:  <= 0.03 ng/mL-   Negative for AMI  >0.03 ng/mL-     Abnormal for myocardial necrosis.  Clinicians would have to utilize clinical acumen, EKG, Troponin and serial changes to determine if it is an Acute Myocardial Infarction or myocardial injury due to an underlying chronic condition.       Results may be falsely decreased if patient taking Biotin.      CBC AND DIFFERENTIAL    Narrative:     The following orders were created for panel order CBC & Differential.  Procedure                               Abnormality         Status                     ---------                               -----------         ------                     CBC Auto Differential[[id number]]        Abnormal            Final result                 Please view results for these tests on the individual orders.   EXTRA TUBES    Narrative:     The following orders were created for panel order Extra Tubes.  Procedure                               Abnormality         Status                     ---------                               -----------         ------                     Light Blue Top[[id number]]                                   Final result               Gold Top - SST[[id number]]                                   Final result                 Please view results for these tests on the individual orders.   LIGHT BLUE TOP   GOLD TOP - SST       No orders to display     No significant acute findings save for mild hyponatremia and        MDM    Final diagnoses:   Dehydration, moderate   Heat cramps, initial encounter   Hyponatremia   Acute renal insufficiency            Tom Villatoro MD  07/26/20 7210

## 2020-09-01 ENCOUNTER — OFFICE VISIT (OUTPATIENT)
Dept: FAMILY MEDICINE CLINIC | Facility: CLINIC | Age: 65
End: 2020-09-01

## 2020-09-01 VITALS
OXYGEN SATURATION: 98 % | TEMPERATURE: 97.2 F | SYSTOLIC BLOOD PRESSURE: 130 MMHG | HEIGHT: 66 IN | BODY MASS INDEX: 25.58 KG/M2 | HEART RATE: 67 BPM | RESPIRATION RATE: 20 BRPM | DIASTOLIC BLOOD PRESSURE: 82 MMHG | WEIGHT: 159.2 LBS

## 2020-09-01 DIAGNOSIS — B02.9 HERPES ZOSTER WITHOUT COMPLICATION: Primary | ICD-10-CM

## 2020-09-01 PROCEDURE — 99213 OFFICE O/P EST LOW 20 MIN: CPT | Performed by: NURSE PRACTITIONER

## 2020-09-01 RX ORDER — VALACYCLOVIR HYDROCHLORIDE 1 G/1
1000 TABLET, FILM COATED ORAL 3 TIMES DAILY
Qty: 21 TABLET | Refills: 0 | Status: SHIPPED | OUTPATIENT
Start: 2020-09-01 | End: 2020-09-08

## 2020-09-01 NOTE — PROGRESS NOTES
Chief Complaint   Patient presents with   • Rash     right breast burning        Subjective:  Genesis Salgado is a 65 y.o. female who presents for rash on right medial breast with pain that radiates from site to right lateral thorax around to back. Reports area had burning sensation approx 2 weeks ago then a blister appeared on right breast area shortly after. Reports hx of shingles x 2 with similar presenting sx in the past. Has used Bacitracin on rash on breast but has not tried anything else. States she would have come sooner but was concerned about coming in d/t risk of being exposed to COVID.       The following portions of the patient's history were reviewed and updated as appropriate: allergies, current medications, past family history, past medical history, past social history, past surgical history and problem list.    Rash   This is a new problem. The current episode started 1 to 4 weeks ago. The problem is unchanged. Location: right medial breast area. The rash is characterized by blistering, burning and redness. She was exposed to nothing. Pertinent negatives include no congestion, cough, fatigue, fever, shortness of breath or sore throat. Past treatments include antibiotic cream. The treatment provided no relief. Her past medical history is significant for varicella.        Past Medical History:   Diagnosis Date   • Adie's syndrome    • Cancer (CMS/HCC)     colon cancer   • Colon cancer (CMS/HCC)    • Hiatal hernia    • Lupus (CMS/HCC)    • Osteoporosis    • Ulcerative colitis (CMS/HCC)          Current Outpatient Medications:   •  dexlansoprazole (DEXILANT) 60 MG capsule, Take 1 capsule by mouth Daily., Disp: 90 capsule, Rfl: 1  •  fexofenadine-pseudoephedrine (ALLEGRA-D 24) 180-240 MG per 24 hr tablet, Take 1 tablet by mouth Daily., Disp: 90 tablet, Rfl: 4  •  hydroxychloroquine (PLAQUENIL) 200 MG tablet, TAKE 1 TABLET DAILY, Disp: 90 tablet, Rfl: 0  •  latanoprost (XALATAN) 0.005 % ophthalmic  "solution, , Disp: , Rfl:   •  meclizine (ANTIVERT) 25 MG tablet, TAKE 1 TABLET 3 TIMES A DAYAS NEEDED FOR DIZZINESS, Disp: 270 tablet, Rfl: 1  •  vitamin D (ERGOCALCIFEROL) 1.25 MG (96377 UT) capsule capsule, TAKE 1 CAPSULE EVERY 7 DAYS, Disp: 12 capsule, Rfl: 1  •  mupirocin (Bactroban) 2 % ointment, Apply  topically to the appropriate area as directed 3 (Three) Times a Day., Disp: 22 g, Rfl: 0  •  valACYclovir (Valtrex) 1000 MG tablet, Take 1 tablet by mouth 3 (Three) Times a Day for 7 days., Disp: 21 tablet, Rfl: 0    Review of Systems    Review of Systems   Constitutional: Negative for chills, fatigue and fever.   HENT: Negative for congestion and sore throat.    Respiratory: Negative for cough, chest tightness and shortness of breath.    Cardiovascular: Negative for chest pain.   Musculoskeletal: Negative for arthralgias and myalgias.   Skin: Positive for rash.   Allergic/Immunologic: Positive for immunocompromised state (lupus - takes hydroxychloroquine). Negative for environmental allergies and food allergies.   Neurological: Negative for dizziness, light-headedness and headaches.       Objective  Vitals:    09/01/20 1051   BP: 130/82   BP Location: Left arm   Patient Position: Sitting   Cuff Size: Adult   Pulse: 67   Resp: 20   Temp: 97.2 °F (36.2 °C)   TempSrc: Tympanic   SpO2: 98%   Weight: 72.2 kg (159 lb 3.2 oz)   Height: 167.6 cm (66\")   PainSc: 0-No pain       Physical Exam   Constitutional: She is oriented to person, place, and time. She appears well-developed and well-nourished. No distress.   HENT:   Head: Normocephalic and atraumatic.   Right Ear: External ear normal.   Left Ear: External ear normal.   Wearing face mask d/t pandemic   Eyes: Conjunctivae are normal.   Neck: Normal range of motion. Neck supple.   Cardiovascular: Normal rate, regular rhythm and normal heart sounds.   Pulmonary/Chest: Effort normal and breath sounds normal.   Musculoskeletal: Normal range of motion. She exhibits " "tenderness (tenderness noted along right T-4 dermatome).   Neurological: She is alert and oriented to person, place, and time.   Skin: Skin is warm and dry. Rash (open lesion right medial breast area with surrounding erythema; no present drainage) noted.        Single pea size open area with surrounding erythema; no streaking or drainage present; pt reports area was \"a blister\" at first then popped.   Psychiatric: She has a normal mood and affect. Her behavior is normal.   Nursing note and vitals reviewed.        Genesis was seen today for rash.    Diagnoses and all orders for this visit:    Herpes zoster without complication  -     valACYclovir (Valtrex) 1000 MG tablet; Take 1 tablet by mouth 3 (Three) Times a Day for 7 days.  -     mupirocin (Bactroban) 2 % ointment; Apply  topically to the appropriate area as directed 3 (Three) Times a Day.    1. Sx and exam consistent with Shingles - even though sx > 72 hours will go ahead and initiate antiviral tx as pt possible poor historian.  2. Mupirocin prescribed for coverage for secondary bacterial infection.  3. Advised to monitor sx closely and RTC or f/u with PCP.       This document has been electronically signed by TEDDY Perez on September 1, 2020 12:42   "

## 2020-09-14 RX ORDER — DEXLANSOPRAZOLE 60 MG/1
CAPSULE, DELAYED RELEASE ORAL
Qty: 90 CAPSULE | Refills: 0 | Status: SHIPPED | OUTPATIENT
Start: 2020-09-14 | End: 2020-09-16 | Stop reason: SDUPTHER

## 2020-09-16 ENCOUNTER — OFFICE VISIT (OUTPATIENT)
Dept: FAMILY MEDICINE CLINIC | Facility: CLINIC | Age: 65
End: 2020-09-16

## 2020-09-16 VITALS
BODY MASS INDEX: 25.3 KG/M2 | TEMPERATURE: 98.6 F | HEIGHT: 66 IN | SYSTOLIC BLOOD PRESSURE: 118 MMHG | HEART RATE: 59 BPM | OXYGEN SATURATION: 97 % | DIASTOLIC BLOOD PRESSURE: 70 MMHG | WEIGHT: 157.4 LBS | RESPIRATION RATE: 20 BRPM

## 2020-09-16 DIAGNOSIS — M32.8 OTHER FORMS OF SYSTEMIC LUPUS ERYTHEMATOSUS, UNSPECIFIED ORGAN INVOLVEMENT STATUS (HCC): ICD-10-CM

## 2020-09-16 DIAGNOSIS — R79.9 ABNORMAL FINDING OF BLOOD CHEMISTRY, UNSPECIFIED: ICD-10-CM

## 2020-09-16 DIAGNOSIS — Z12.11 SCREEN FOR COLON CANCER: ICD-10-CM

## 2020-09-16 DIAGNOSIS — R12 HEARTBURN: Primary | ICD-10-CM

## 2020-09-16 DIAGNOSIS — K44.9 HIATAL HERNIA: ICD-10-CM

## 2020-09-16 DIAGNOSIS — E55.9 VITAMIN D DEFICIENCY DISEASE: ICD-10-CM

## 2020-09-16 DIAGNOSIS — Z13.220 SCREENING FOR LIPOID DISORDERS: ICD-10-CM

## 2020-09-16 DIAGNOSIS — Z13.29 SCREENING FOR THYROID DISORDER: ICD-10-CM

## 2020-09-16 PROCEDURE — 99214 OFFICE O/P EST MOD 30 MIN: CPT | Performed by: NURSE PRACTITIONER

## 2020-09-16 RX ORDER — DEXLANSOPRAZOLE 60 MG/1
1 CAPSULE, DELAYED RELEASE ORAL DAILY
Qty: 90 CAPSULE | Refills: 3 | Status: SHIPPED | OUTPATIENT
Start: 2020-09-16 | End: 2020-09-16 | Stop reason: SDUPTHER

## 2020-09-16 RX ORDER — DEXLANSOPRAZOLE 60 MG/1
1 CAPSULE, DELAYED RELEASE ORAL DAILY
Qty: 90 CAPSULE | Refills: 3 | Status: SHIPPED | OUTPATIENT
Start: 2020-09-16 | End: 2021-09-10

## 2020-09-16 NOTE — PATIENT INSTRUCTIONS
Preventive Care 65 Years and Older, Female  Preventive care refers to lifestyle choices and visits with your health care provider that can promote health and wellness. This includes:  · A yearly physical exam. This is also called an annual well check.  · Regular dental and eye exams.  · Immunizations.  · Screening for certain conditions.  · Healthy lifestyle choices, such as diet and exercise.  What can I expect for my preventive care visit?  Physical exam  Your health care provider will check:  · Height and weight. These may be used to calculate body mass index (BMI), which is a measurement that tells if you are at a healthy weight.  · Heart rate and blood pressure.  · Your skin for abnormal spots.  Counseling  Your health care provider may ask you questions about:  · Alcohol, tobacco, and drug use.  · Emotional well-being.  · Home and relationship well-being.  · Sexual activity.  · Eating habits.  · History of falls.  · Memory and ability to understand (cognition).  · Work and work environment.  · Pregnancy and menstrual history.  What immunizations do I need?    Influenza (flu) vaccine  · This is recommended every year.  Tetanus, diphtheria, and pertussis (Tdap) vaccine  · You may need a Td booster every 10 years.  Varicella (chickenpox) vaccine  · You may need this vaccine if you have not already been vaccinated.  Zoster (shingles) vaccine  · You may need this after age 60.  Pneumococcal conjugate (PCV13) vaccine  · One dose is recommended after age 65.  Pneumococcal polysaccharide (PPSV23) vaccine  · One dose is recommended after age 65.  Measles, mumps, and rubella (MMR) vaccine  · You may need at least one dose of MMR if you were born in 1957 or later. You may also need a second dose.  Meningococcal conjugate (MenACWY) vaccine  · You may need this if you have certain conditions.  Hepatitis A vaccine  · You may need this if you have certain conditions or if you travel or work in places where you may be exposed  to hepatitis A.  Hepatitis B vaccine  · You may need this if you have certain conditions or if you travel or work in places where you may be exposed to hepatitis B.  Haemophilus influenzae type b (Hib) vaccine  · You may need this if you have certain conditions.  You may receive vaccines as individual doses or as more than one vaccine together in one shot (combination vaccines). Talk with your health care provider about the risks and benefits of combination vaccines.  What tests do I need?  Blood tests  · Lipid and cholesterol levels. These may be checked every 5 years, or more frequently depending on your overall health.  · Hepatitis C test.  · Hepatitis B test.  Screening  · Lung cancer screening. You may have this screening every year starting at age 55 if you have a 30-pack-year history of smoking and currently smoke or have quit within the past 15 years.  · Colorectal cancer screening. All adults should have this screening starting at age 50 and continuing until age 75. Your health care provider may recommend screening at age 45 if you are at increased risk. You will have tests every 1-10 years, depending on your results and the type of screening test.  · Diabetes screening. This is done by checking your blood sugar (glucose) after you have not eaten for a while (fasting). You may have this done every 1-3 years.  · Mammogram. This may be done every 1-2 years. Talk with your health care provider about how often you should have regular mammograms.  · BRCA-related cancer screening. This may be done if you have a family history of breast, ovarian, tubal, or peritoneal cancers.  Other tests  · Sexually transmitted disease (STD) testing.  · Bone density scan. This is done to screen for osteoporosis. You may have this done starting at age 65.  Follow these instructions at home:  Eating and drinking  · Eat a diet that includes fresh fruits and vegetables, whole grains, lean protein, and low-fat dairy products. Limit  your intake of foods with high amounts of sugar, saturated fats, and salt.  · Take vitamin and mineral supplements as recommended by your health care provider.  · Do not drink alcohol if your health care provider tells you not to drink.  · If you drink alcohol:  ? Limit how much you have to 0-1 drink a day.  ? Be aware of how much alcohol is in your drink. In the [place] one drink equals one 12 oz bottle of beer (355 mL), one 5 oz glass of wine (148 mL), or one 1½ oz glass of hard liquor (44 mL).  Lifestyle  · Take daily care of your teeth and gums.  · Stay active. Exercise for at least 30 minutes on 5 or more days each week.  · Do not use any products that contain nicotine or tobacco, such as cigarettes, e-cigarettes, and chewing tobacco. If you need help quitting, ask your health care provider.  · If you are sexually active, practice safe sex. Use a condom or other form of protection in order to prevent STIs (sexually transmitted infections).  · Talk with your health care provider about taking a low-dose aspirin or statin.  What's next?  · Go to your health care provider once a year for a well check visit.  · Ask your health care provider how often you should have your eyes and teeth checked.  · Stay up to date on all vaccines.  This information is not intended to replace advice given to you by your health care provider. Make sure you discuss any questions you have with your health care provider.  Document Released: 01/13/2017 Document Revised: 12/12/2019 Document Reviewed: 12/12/2019  Else"[employer]" Patient Education © 2020 [employer]

## 2020-09-18 ENCOUNTER — LAB (OUTPATIENT)
Dept: LAB | Facility: HOSPITAL | Age: 65
End: 2020-09-18

## 2020-09-18 PROCEDURE — 82306 VITAMIN D 25 HYDROXY: CPT | Performed by: NURSE PRACTITIONER

## 2020-09-18 PROCEDURE — 85025 COMPLETE CBC W/AUTO DIFF WBC: CPT | Performed by: NURSE PRACTITIONER

## 2020-09-18 PROCEDURE — 80053 COMPREHEN METABOLIC PANEL: CPT | Performed by: NURSE PRACTITIONER

## 2020-09-18 PROCEDURE — 84443 ASSAY THYROID STIM HORMONE: CPT | Performed by: NURSE PRACTITIONER

## 2020-09-18 PROCEDURE — 80061 LIPID PANEL: CPT | Performed by: NURSE PRACTITIONER

## 2020-09-18 PROCEDURE — 83036 HEMOGLOBIN GLYCOSYLATED A1C: CPT | Performed by: NURSE PRACTITIONER

## 2020-09-19 LAB
25(OH)D3 SERPL-MCNC: 28.4 NG/ML (ref 30–100)
ALBUMIN SERPL-MCNC: 4.2 G/DL (ref 3.5–5.2)
ALBUMIN/GLOB SERPL: 1.4 G/DL
ALP SERPL-CCNC: 74 U/L (ref 39–117)
ALT SERPL W P-5'-P-CCNC: 13 U/L (ref 1–33)
ANION GAP SERPL CALCULATED.3IONS-SCNC: 10.3 MMOL/L (ref 5–15)
AST SERPL-CCNC: 17 U/L (ref 1–32)
BASOPHILS # BLD AUTO: 0.03 10*3/MM3 (ref 0–0.2)
BASOPHILS NFR BLD AUTO: 0.7 % (ref 0–1.5)
BILIRUB SERPL-MCNC: 0.5 MG/DL (ref 0–1.2)
BUN SERPL-MCNC: 13 MG/DL (ref 8–23)
BUN/CREAT SERPL: 14.1 (ref 7–25)
CALCIUM SPEC-SCNC: 9 MG/DL (ref 8.6–10.5)
CHLORIDE SERPL-SCNC: 105 MMOL/L (ref 98–107)
CHOLEST SERPL-MCNC: 169 MG/DL (ref 0–200)
CO2 SERPL-SCNC: 24.7 MMOL/L (ref 22–29)
CREAT SERPL-MCNC: 0.92 MG/DL (ref 0.57–1)
DEPRECATED RDW RBC AUTO: 41 FL (ref 37–54)
EOSINOPHIL # BLD AUTO: 0.07 10*3/MM3 (ref 0–0.4)
EOSINOPHIL NFR BLD AUTO: 1.6 % (ref 0.3–6.2)
ERYTHROCYTE [DISTWIDTH] IN BLOOD BY AUTOMATED COUNT: 13 % (ref 12.3–15.4)
GFR SERPL CREATININE-BSD FRML MDRD: 61 ML/MIN/1.73
GLOBULIN UR ELPH-MCNC: 3.1 GM/DL
GLUCOSE SERPL-MCNC: 83 MG/DL (ref 65–99)
HBA1C MFR BLD: 5.6 % (ref 4.8–5.6)
HCT VFR BLD AUTO: 37.4 % (ref 34–46.6)
HDLC SERPL-MCNC: 71 MG/DL (ref 40–60)
HGB BLD-MCNC: 13.1 G/DL (ref 12–15.9)
IMM GRANULOCYTES # BLD AUTO: 0.01 10*3/MM3 (ref 0–0.05)
IMM GRANULOCYTES NFR BLD AUTO: 0.2 % (ref 0–0.5)
LDLC SERPL CALC-MCNC: 90 MG/DL (ref 0–100)
LDLC/HDLC SERPL: 1.27 {RATIO}
LYMPHOCYTES # BLD AUTO: 1.33 10*3/MM3 (ref 0.7–3.1)
LYMPHOCYTES NFR BLD AUTO: 29.7 % (ref 19.6–45.3)
MCH RBC QN AUTO: 30.7 PG (ref 26.6–33)
MCHC RBC AUTO-ENTMCNC: 35 G/DL (ref 31.5–35.7)
MCV RBC AUTO: 87.6 FL (ref 79–97)
MONOCYTES # BLD AUTO: 0.37 10*3/MM3 (ref 0.1–0.9)
MONOCYTES NFR BLD AUTO: 8.3 % (ref 5–12)
NEUTROPHILS NFR BLD AUTO: 2.67 10*3/MM3 (ref 1.7–7)
NEUTROPHILS NFR BLD AUTO: 59.5 % (ref 42.7–76)
NRBC BLD AUTO-RTO: 0 /100 WBC (ref 0–0.2)
PLATELET # BLD AUTO: 256 10*3/MM3 (ref 140–450)
PMV BLD AUTO: 11.3 FL (ref 6–12)
POTASSIUM SERPL-SCNC: 4.2 MMOL/L (ref 3.5–5.2)
PROT SERPL-MCNC: 7.3 G/DL (ref 6–8.5)
RBC # BLD AUTO: 4.27 10*6/MM3 (ref 3.77–5.28)
SODIUM SERPL-SCNC: 140 MMOL/L (ref 136–145)
TRIGL SERPL-MCNC: 38 MG/DL (ref 0–150)
TSH SERPL DL<=0.05 MIU/L-ACNC: 1.24 UIU/ML (ref 0.27–4.2)
VLDLC SERPL-MCNC: 7.6 MG/DL (ref 5–40)
WBC # BLD AUTO: 4.48 10*3/MM3 (ref 3.4–10.8)

## 2020-09-22 ENCOUNTER — TELEPHONE (OUTPATIENT)
Dept: FAMILY MEDICINE CLINIC | Facility: CLINIC | Age: 65
End: 2020-09-22

## 2020-09-22 NOTE — TELEPHONE ENCOUNTER
----- Message from TEDDY Zelaya sent at 9/20/2020 10:45 PM CDT -----  Vitamin D is slightly low.  Is she taking her vitamin D supplement without missing doses?  Otherwise her labs look great.

## 2020-09-24 ENCOUNTER — TELEPHONE (OUTPATIENT)
Dept: FAMILY MEDICINE CLINIC | Facility: CLINIC | Age: 65
End: 2020-09-24

## 2020-09-24 NOTE — TELEPHONE ENCOUNTER
----- Message from TEDDY Zelaya sent at 9/20/2020 10:45 PM CDT -----  Vitamin D is slightly low.  Is she taking her vitamin D supplement without missing doses?  Otherwise her labs look great.    Pt stated that she has missed doses of the Vit D but will do better at taking it.

## 2020-09-30 NOTE — PROGRESS NOTES
Subjective   Genesis Salgado is a 65 y.o. female.     She presents today for her routine follow-up on chronic medical problems.  She reports that she does have a history of a hiatal hernia.  She feels like she is experiencing more difficulty with this recently.  She would like a referral to Dr. Leon to discuss this.  She reports that her allergy symptoms are currently fairly well controlled.  Her motion sickness is also well controlled.  She continues to follow-up with rheumatology.  She does need refills on her routine oral medications.  She is also due for routine fasting labs.  She is otherwise without any other new complaints.    Heartburn  She complains of abdominal pain, belching, heartburn and nausea. She reports no chest pain, no choking, no coughing, no dysphagia, no early satiety, no globus sensation, no hoarse voice, no sore throat, no stridor, no tooth decay, no water brash or no wheezing. This is a recurrent problem. The current episode started more than 1 month ago. The problem occurs frequently. The problem has been waxing and waning. Associated symptoms include fatigue. Pertinent negatives include no anemia, melena, muscle weakness, orthopnea or weight loss. Risk factors include hiatal hernia. She has tried a PPI for the symptoms. The treatment provided moderate relief.        The following portions of the patient's history were reviewed and updated as appropriate: allergies, current medications, past family history, past medical history, past social history, past surgical history and problem list.    Review of Systems   Constitutional: Positive for fatigue. Negative for unexpected weight loss.   HENT: Negative.  Negative for hoarse voice and sore throat.    Eyes: Negative.    Respiratory: Negative.  Negative for cough, choking and wheezing.    Cardiovascular: Negative.  Negative for chest pain.   Gastrointestinal: Positive for abdominal pain, nausea, GERD and indigestion. Negative for dysphagia  and melena.   Musculoskeletal: Negative.  Negative for muscle weakness.   Skin: Negative.    Allergic/Immunologic: Negative.    Neurological: Negative.    Hematological: Negative.    Psychiatric/Behavioral: Negative.        Objective   Physical Exam  Vitals signs reviewed.   Constitutional:       General: She is not in acute distress.     Appearance: She is well-developed. She is not diaphoretic.   HENT:      Head: Normocephalic.      Right Ear: External ear normal.      Left Ear: External ear normal.      Nose: Nose normal.   Eyes:      Pupils: Pupils are equal, round, and reactive to light.   Neck:      Musculoskeletal: Normal range of motion and neck supple.      Thyroid: No thyromegaly.      Vascular: No JVD.   Cardiovascular:      Rate and Rhythm: Normal rate and regular rhythm.      Heart sounds: No murmur. No friction rub. No gallop.    Pulmonary:      Effort: Pulmonary effort is normal. No respiratory distress.      Breath sounds: Normal breath sounds. No wheezing or rales.   Musculoskeletal: Normal range of motion.   Skin:     General: Skin is warm and dry.      Coloration: Skin is not pale.      Findings: No erythema or rash.   Neurological:      Mental Status: She is alert and oriented to person, place, and time.   Psychiatric:         Behavior: Behavior normal.         Thought Content: Thought content normal.         Judgment: Judgment normal.           Assessment/Plan   Genesis was seen today for follow-up.    Diagnoses and all orders for this visit:    Heartburn  -     Discontinue: dexlansoprazole (Dexilant) 60 MG capsule; Take 1 capsule by mouth Daily.  -     CBC & Differential  -     Comprehensive Metabolic Panel  -     Hemoglobin A1c  -     Lipid Panel  -     TSH  -     Vitamin D 25 Hydroxy  -     dexlansoprazole (Dexilant) 60 MG capsule; Take 1 capsule by mouth Daily.  -     CBC Auto Differential    Hiatal hernia  -     Ambulatory Referral to General Surgery  -     CBC & Differential  -      Comprehensive Metabolic Panel  -     Hemoglobin A1c  -     Lipid Panel  -     TSH  -     Vitamin D 25 Hydroxy  -     CBC Auto Differential    Screen for colon cancer  -     Ambulatory Referral to General Surgery  -     CBC & Differential  -     Comprehensive Metabolic Panel  -     Hemoglobin A1c  -     Lipid Panel  -     TSH  -     Vitamin D 25 Hydroxy  -     CBC Auto Differential    Vitamin D deficiency disease  -     CBC & Differential  -     Comprehensive Metabolic Panel  -     Hemoglobin A1c  -     Lipid Panel  -     TSH  -     Vitamin D 25 Hydroxy  -     CBC Auto Differential    Other forms of systemic lupus erythematosus, unspecified organ involvement status (CMS/HCC)  -     CBC & Differential  -     Comprehensive Metabolic Panel  -     Hemoglobin A1c  -     Lipid Panel  -     TSH  -     Vitamin D 25 Hydroxy  -     CBC Auto Differential    Screening for lipoid disorders  -     CBC & Differential  -     Comprehensive Metabolic Panel  -     Hemoglobin A1c  -     Lipid Panel  -     TSH  -     Vitamin D 25 Hydroxy  -     CBC Auto Differential    Screening for thyroid disorder  -     CBC & Differential  -     Comprehensive Metabolic Panel  -     Hemoglobin A1c  -     Lipid Panel  -     TSH  -     Vitamin D 25 Hydroxy  -     CBC Auto Differential    Abnormal finding of blood chemistry, unspecified   -     Hemoglobin A1c  -     Lipid Panel               Patient's Body mass index is 25.41 kg/m². BMI is within normal parameters. No follow-up required..    Fasting labs.  Referral to general surgery regarding her hiatal hernia.  Continue current medications.  Follow up in 6 months for routine follow up.  Follow up sooner for problems/concerns.  Patient verbalized understanding and agreement with plan of care.        This document has been electronically signed by TEDDY Zelaya on September 30, 2020 10:09 CDT

## 2020-10-14 ENCOUNTER — OFFICE VISIT (OUTPATIENT)
Dept: FAMILY MEDICINE CLINIC | Facility: CLINIC | Age: 65
End: 2020-10-14

## 2020-10-14 VITALS
TEMPERATURE: 96.9 F | WEIGHT: 154 LBS | OXYGEN SATURATION: 98 % | HEIGHT: 66 IN | SYSTOLIC BLOOD PRESSURE: 138 MMHG | DIASTOLIC BLOOD PRESSURE: 90 MMHG | BODY MASS INDEX: 24.75 KG/M2 | RESPIRATION RATE: 20 BRPM | HEART RATE: 78 BPM

## 2020-10-14 DIAGNOSIS — M79.644 THUMB PAIN, RIGHT: Primary | ICD-10-CM

## 2020-10-14 PROCEDURE — 99213 OFFICE O/P EST LOW 20 MIN: CPT | Performed by: NURSE PRACTITIONER

## 2020-10-14 RX ORDER — PREDNISONE 20 MG/1
TABLET ORAL
Qty: 10 TABLET | Refills: 0 | Status: SHIPPED | OUTPATIENT
Start: 2020-10-14 | End: 2020-11-18

## 2020-10-14 NOTE — PROGRESS NOTES
"Subjective   Genesis Mery Salgado is a 65 y.o. female.     [facility]    PCP: TEDDY Gloria    CC: \"thumb pain\"    Reports hx of trigger finger x 2 to right hand and has had surgery to correct by Dr. Guevara in [place].  Right hand dominant.  Reports hx of systemic lupus and unsure whether the pain is related to flare.  Last treated with Prednisone around March of this year.      Hand Pain   Incident onset: x 2 weeks. The incident occurred at home. The injury mechanism is unknown (reports she hits it frequently, so unsure of what may have caused injury). Pain location: right thumb. The quality of the pain is described as aching (throbbing). The pain radiates to the right arm. The pain is at a severity of 10/10. The pain has been fluctuating since the incident. Associated symptoms include tingling ( last night, none today). Pertinent negatives include no chest pain, muscle weakness or numbness. The symptoms are aggravated by movement and palpation. She has tried nothing for the symptoms.        The following portions of the patient's history were reviewed and updated as appropriate: allergies, current medications, past medical history, past social history, past surgical history and problem list.    Review of Systems   Constitutional: Negative.    Respiratory: Negative.    Cardiovascular: Negative.  Negative for chest pain.   Genitourinary: Negative for difficulty urinating.   Musculoskeletal: Positive for joint swelling ( right thumb).   Skin: Negative.         Reports some discoloration last week, but now improved     Neurological: Positive for tingling ( last night, none today). Negative for numbness and headache.     /90 (BP Location: Left arm, Patient Position: Sitting, Cuff Size: Adult)   Pulse 78   Temp 96.9 °F (36.1 °C) (Temporal)   Resp 20   Ht 167.6 cm (66\")   Wt 69.9 kg (154 lb)   SpO2 98%   BMI 24.86 kg/m²     Objective   Physical Exam  Constitutional:       General: She is " not in acute distress.     Appearance: Normal appearance.   Cardiovascular:      Rate and Rhythm: Normal rate and regular rhythm.   Pulmonary:      Effort: Pulmonary effort is normal. No respiratory distress.      Breath sounds: Normal breath sounds. No wheezing, rhonchi or rales.   Musculoskeletal:      Right hand: She exhibits decreased range of motion, tenderness, bony tenderness and swelling ( mild). She exhibits normal capillary refill and no deformity.        Hands:    Skin:     General: Skin is warm and dry.      Capillary Refill: Capillary refill takes less than 2 seconds.      Findings: No bruising or erythema.   Neurological:      Mental Status: She is alert.       No results found for this or any previous visit (from the past 24 hour(s)).  No Images in the past 120 days found..      Assessment/Plan   Diagnoses and all orders for this visit:    1. Thumb pain, right (Primary)  -     XR Hand 3+ View Right (In Office)      Xrays pending--will call with results when available  If no fracture, will give Prednisone  If symptoms persist, recommend f/u with orthopedics (Dr. Guevara)    See PCP or RTC if symptoms persist/worsen  See PCP for routine f/u visit and management of chronic medical conditions      This document has been electronically signed by TEDDY Walters on October 14, 2020 13:13 CDT,.

## 2020-10-14 NOTE — PROGRESS NOTES
Pt notified of xray results, pt would like prednisone sent to [facility] in [place]. Pt states she would call Dr. Thrasher in [place] if no relief and she would call if need for a new referral.

## 2020-11-16 ENCOUNTER — TELEPHONE (OUTPATIENT)
Dept: FAMILY MEDICINE CLINIC | Facility: CLINIC | Age: 65
End: 2020-11-16

## 2020-11-16 NOTE — TELEPHONE ENCOUNTER
Returned call to Pt to inform her that Daria is out of the office today if she needs to come in tomorrow the Walk in clinic will be open.

## 2020-11-16 NOTE — TELEPHONE ENCOUNTER
Her thumb is bothering her a lot would like to to see someone about this ASAP she wants to fawn       Please call her    [phone]

## 2020-11-18 ENCOUNTER — OFFICE VISIT (OUTPATIENT)
Dept: FAMILY MEDICINE CLINIC | Facility: CLINIC | Age: 65
End: 2020-11-18

## 2020-11-18 ENCOUNTER — LAB (OUTPATIENT)
Dept: LAB | Facility: HOSPITAL | Age: 65
End: 2020-11-18

## 2020-11-18 VITALS
HEART RATE: 70 BPM | RESPIRATION RATE: 20 BRPM | HEIGHT: 66 IN | OXYGEN SATURATION: 99 % | TEMPERATURE: 97.3 F | WEIGHT: 155.25 LBS | DIASTOLIC BLOOD PRESSURE: 82 MMHG | BODY MASS INDEX: 24.95 KG/M2 | SYSTOLIC BLOOD PRESSURE: 136 MMHG

## 2020-11-18 DIAGNOSIS — J30.2 SEASONAL ALLERGIC RHINITIS, UNSPECIFIED TRIGGER: ICD-10-CM

## 2020-11-18 DIAGNOSIS — M79.89 SWELLING OF RIGHT THUMB: ICD-10-CM

## 2020-11-18 DIAGNOSIS — M79.644 THUMB PAIN, RIGHT: Primary | ICD-10-CM

## 2020-11-18 LAB
ASO AB SERPL-ACNC: NEGATIVE [IU]/ML
CHROMATIN AB SERPL-ACNC: <10 IU/ML (ref 0–14)
CRP SERPL-MCNC: <0.03 MG/DL (ref 0–0.5)
URATE SERPL-MCNC: 3.2 MG/DL (ref 2.4–5.7)

## 2020-11-18 PROCEDURE — 86038 ANTINUCLEAR ANTIBODIES: CPT | Performed by: NURSE PRACTITIONER

## 2020-11-18 PROCEDURE — 86140 C-REACTIVE PROTEIN: CPT | Performed by: NURSE PRACTITIONER

## 2020-11-18 PROCEDURE — 99214 OFFICE O/P EST MOD 30 MIN: CPT | Performed by: NURSE PRACTITIONER

## 2020-11-18 PROCEDURE — 86200 CCP ANTIBODY: CPT | Performed by: NURSE PRACTITIONER

## 2020-11-18 PROCEDURE — 86063 ANTISTREPTOLYSIN O SCREEN: CPT | Performed by: NURSE PRACTITIONER

## 2020-11-18 PROCEDURE — 86431 RHEUMATOID FACTOR QUANT: CPT | Performed by: NURSE PRACTITIONER

## 2020-11-18 PROCEDURE — 84550 ASSAY OF BLOOD/URIC ACID: CPT | Performed by: NURSE PRACTITIONER

## 2020-11-18 NOTE — PROGRESS NOTES
"Subjective   Genesis Mery Salgado is a 65 y.o. female.     [facility]    PCP: TEDDY Gloria    CC: \"thumb pain; check fluid for fluid in left ear\"    Reports history of trigger finger requiring surgery to right hand x 2 in the past.  Was seen by Dr. Guevara in [place] and has yet to schedule a f/u as she feels this is something different.      Hx of Lupus--waiting on referral to see Rheumatology in [place], missed appt in July, didn't receive letter in mail    Denies hx of gout or RA.      Has been trying some splints with little relief.  Heat helps some.      Dizziness  This is a recurrent problem. The current episode started in the past 7 days. The problem occurs intermittently. The problem has been waxing and waning. Associated symptoms include arthralgias ( right thumb pain), congestion ( mild), numbness (at times in thumb) and vertigo (hx of same). Pertinent negatives include no abdominal pain, anorexia, change in bowel habit, chest pain, chills, coughing, diaphoresis, fatigue, fever, headaches, joint swelling, myalgias, nausea, neck pain, rash, sore throat, swollen glands, urinary symptoms, visual change, vomiting or weakness. Exacerbated by: bending forward. Treatments tried: had not been taking Allegra D, just got refilled to start taking again; has Meclizine at home if needed.   Hand Pain   Incident onset: persistent for over a month. The incident occurred at home. Injury mechanism: hit hand and had bruising about 2 months ago, pain in left thumb for over a month. Pain location: +left thumb. The quality of the pain is described as aching and shooting (shoots up arm with certain movements/gripping). The pain radiates to the left arm. The pain is moderate (at times). The pain has been fluctuating since the incident. Associated symptoms include numbness (at times in thumb). Pertinent negatives include no chest pain, muscle weakness or tingling. The symptoms are aggravated by movement and " "lifting. She has tried heat and immobilization (had Prednisone last month with improvement, but never completely resolved) for the symptoms. The treatment provided mild relief.        The following portions of the patient's history were reviewed and updated as appropriate: allergies, current medications, past medical history, past social history, past surgical history and problem list.    Review of Systems   Constitutional: Negative for appetite change, chills, diaphoresis, fatigue and fever.   HENT: Positive for congestion ( mild) and sinus pressure ( occasional). Negative for ear discharge, ear pain, postnasal drip, rhinorrhea, sneezing, sore throat and swollen glands.    Eyes: Negative.    Respiratory: Negative.  Negative for cough.    Cardiovascular: Negative.  Negative for chest pain.   Gastrointestinal: Negative for abdominal pain, anorexia, change in bowel habit, diarrhea, nausea, vomiting and GERD.   Genitourinary: Negative for difficulty urinating.   Musculoskeletal: Positive for arthralgias ( right thumb pain). Negative for joint swelling, myalgias and neck pain.   Skin: Negative for rash.   Neurological: Positive for dizziness ( off balance), vertigo (hx of same) and numbness (at times in thumb). Negative for tingling and weakness.     /82 (BP Location: Left arm, Patient Position: Sitting, Cuff Size: Adult)   Pulse 70   Temp 97.3 °F (36.3 °C) (Temporal)   Resp 20   Ht 167.6 cm (66\")   Wt 70.4 kg (155 lb 4 oz)   SpO2 99%   BMI 25.06 kg/m²     Objective   Physical Exam  Vitals signs and nursing note reviewed.   Constitutional:       General: She is not in acute distress.     Appearance: Normal appearance. She is normal weight. She is not ill-appearing.   HENT:      Head: Normocephalic and atraumatic.      Right Ear: Tympanic membrane and ear canal normal.      Left Ear: Tympanic membrane and ear canal normal.      Nose: Congestion ( mild) present.      Comments: Pale mucosa    Eyes:      " General:         Right eye: No discharge.         Left eye: No discharge.      Conjunctiva/sclera: Conjunctivae normal.   Neck:      Musculoskeletal: Neck supple.   Cardiovascular:      Rate and Rhythm: Normal rate and regular rhythm.   Pulmonary:      Effort: Pulmonary effort is normal. No respiratory distress.      Breath sounds: Normal breath sounds. No wheezing, rhonchi or rales.   Musculoskeletal:         General: Swelling and tenderness present.        Hands:    Lymphadenopathy:      Cervical: No cervical adenopathy.   Skin:     General: Skin is warm and dry.      Findings: No bruising or erythema.   Neurological:      General: No focal deficit present.      Mental Status: She is alert and oriented to person, place, and time.       No results found for this or any previous visit (from the past 24 hour(s)).  Xr Hand 3+ View Right (in Office)    Result Date: 10/14/2020  CONCLUSION:  1. Unremarkable right hand. Electronically signed by:  Vicente Perea MD  10/14/2020 2:11 PM CDT Workstation: [id number]        Assessment/Plan   Diagnoses and all orders for this visit:    1. Thumb pain, right (Primary)  -     XR Finger 2+ View Right (In Office)  -     Uric acid  -     Antistreptolysin O screen  -     Rheumatoid factor  -     C-reactive protein  -     WINSOME  -     Cyclic Citrul Peptide Antibody, IgG / IgA    2. Swelling of right thumb  -     Uric acid  -     Antistreptolysin O screen  -     Rheumatoid factor  -     C-reactive protein  -     WINSOME  -     Cyclic Citrul Peptide Antibody, IgG / IgA    3. Seasonal allergic rhinitis, unspecified trigger      Continue with heat/ice as needed  Tylenol as needed  Continue with thumb splint, may be of benefit to try thumb spica, she will consider this  Repeat xray of thumb today.  Labs as above.  If normal, will consider MRI.    Declines referral back to orthopedics at this time, wishes to have above testing done first.     Continue with Allegra D for allergy symptoms.     Continue with Meclizine PRN dizziness.    See PCP or RTC if symptoms persist/worsen  See PCP for routine f/u visit and management of chronic medical conditions      This document has been electronically signed by TEDDY Walters on November 18, 2020 10:03 CST,.

## 2020-11-19 LAB — ANA SER QL: NEGATIVE

## 2020-11-20 DIAGNOSIS — M79.641 RIGHT HAND PAIN: ICD-10-CM

## 2020-11-20 DIAGNOSIS — M79.644 PAIN OF RIGHT THUMB: Primary | ICD-10-CM

## 2020-11-20 LAB — CCP IGA+IGG SERPL IA-ACNC: 13 UNITS (ref 0–19)

## 2020-11-20 NOTE — PROGRESS NOTES
Pt notified of results, pt states she does not have to have an open MRI as long as it is here in [place] she doesn't mind where at, please advise.

## 2020-12-04 ENCOUNTER — DOCUMENTATION (OUTPATIENT)
Dept: FAMILY MEDICINE CLINIC | Facility: CLINIC | Age: 65
End: 2020-12-04

## 2020-12-04 NOTE — PROGRESS NOTES
Patient notified of MRI results dated 11-.  Shows mild fluid and subcutaneous edema of the thumb extending medially along the dickson surface. Has normal bone marrow signal.  The flexor and extensor tendon groups are unremarkable.      Patient wishes to f/u with orthopedist in [place] that she has seen before.  She will let us know if referral is needed.

## 2020-12-07 DIAGNOSIS — M79.644 PAIN OF RIGHT THUMB: ICD-10-CM

## 2020-12-09 DIAGNOSIS — E55.9 VITAMIN D DEFICIENCY DISEASE: ICD-10-CM

## 2020-12-09 RX ORDER — ERGOCALCIFEROL 1.25 MG/1
CAPSULE ORAL
Qty: 12 CAPSULE | Refills: 1 | Status: SHIPPED | OUTPATIENT
Start: 2020-12-09 | End: 2021-05-27

## 2020-12-15 ENCOUNTER — TELEPHONE (OUTPATIENT)
Dept: FAMILY MEDICINE CLINIC | Facility: CLINIC | Age: 65
End: 2020-12-15

## 2020-12-15 DIAGNOSIS — R30.0 DYSURIA: Primary | ICD-10-CM

## 2020-12-15 NOTE — TELEPHONE ENCOUNTER
PATIENT CALLING IN REQUESTING A REFILL ON BLADDER INFECTION ANTIBIOTICS, IF POSSIBLE.     CALL BACK NUMBER: [phone]     CONFIRMED PHARMACY: [facility]/pharmacy #[id number] - [address] [phone] [facility] [phone]   [phone]

## 2020-12-16 NOTE — TELEPHONE ENCOUNTER
Pt called wanting an refill on the  antibiotic for a UTI. Do you want me to tell her to come in for a visit or provide a UA in the lab?

## 2021-02-08 ENCOUNTER — IMMUNIZATION (OUTPATIENT)
Dept: VACCINE CLINIC | Facility: HOSPITAL | Age: 66
End: 2021-02-08

## 2021-02-08 PROCEDURE — 0001A: CPT | Performed by: THORACIC SURGERY (CARDIOTHORACIC VASCULAR SURGERY)

## 2021-02-08 PROCEDURE — 91300 HC SARSCOV02 VAC 30MCG/0.3ML IM: CPT | Performed by: THORACIC SURGERY (CARDIOTHORACIC VASCULAR SURGERY)

## 2021-02-09 DIAGNOSIS — J06.9 ACUTE URI: ICD-10-CM

## 2021-02-09 RX ORDER — FEXOFENADINE HCL AND PSEUDOEPHEDRINE HCI 180; 240 MG/1; MG/1
1 TABLET, EXTENDED RELEASE ORAL DAILY
Qty: 90 TABLET | Refills: 0 | Status: SHIPPED | OUTPATIENT
Start: 2021-02-09 | End: 2021-08-19 | Stop reason: SDUPTHER

## 2021-02-09 NOTE — TELEPHONE ENCOUNTER
Caller: Genesis Salgado Mery    Relationship: Self    Best call back number: [phone]    Medication needed:   Requested Prescriptions     Pending Prescriptions Disp Refills   • fexofenadine-pseudoephedrine (ALLEGRA-D 24) 180-240 MG per 24 hr tablet 90 tablet 4     Sig: Take 1 tablet by mouth Daily.       When do you need the refill by: ASAP    What details did the patient provide when requesting the medication:     Completely out    Does the patient have less than a 3 day supply:  [x] Yes  [] No    What is the patient's preferred pharmacy: [facility]/PHARMACY #[id number] - [place], [place] - [address] [facility] [phone] [facility] [phone] FX

## 2021-03-01 ENCOUNTER — IMMUNIZATION (OUTPATIENT)
Dept: VACCINE CLINIC | Facility: HOSPITAL | Age: 66
End: 2021-03-01

## 2021-03-01 PROCEDURE — 0002A: CPT | Performed by: THORACIC SURGERY (CARDIOTHORACIC VASCULAR SURGERY)

## 2021-03-01 PROCEDURE — 91300 HC SARSCOV02 VAC 30MCG/0.3ML IM: CPT | Performed by: THORACIC SURGERY (CARDIOTHORACIC VASCULAR SURGERY)

## 2021-03-09 RX ORDER — MECLIZINE HYDROCHLORIDE 25 MG/1
25 TABLET ORAL 3 TIMES DAILY PRN
Qty: 270 TABLET | Refills: 1 | Status: SHIPPED | OUTPATIENT
Start: 2021-03-09 | End: 2021-03-30

## 2021-03-22 ENCOUNTER — TELEPHONE (OUTPATIENT)
Dept: FAMILY MEDICINE CLINIC | Facility: CLINIC | Age: 66
End: 2021-03-22

## 2021-03-23 ENCOUNTER — OFFICE VISIT (OUTPATIENT)
Dept: FAMILY MEDICINE CLINIC | Facility: CLINIC | Age: 66
End: 2021-03-23

## 2021-03-23 VITALS
RESPIRATION RATE: 20 BRPM | TEMPERATURE: 96.2 F | OXYGEN SATURATION: 96 % | HEIGHT: 68 IN | HEART RATE: 75 BPM | WEIGHT: 160.8 LBS | SYSTOLIC BLOOD PRESSURE: 130 MMHG | DIASTOLIC BLOOD PRESSURE: 70 MMHG | BODY MASS INDEX: 24.37 KG/M2

## 2021-03-23 DIAGNOSIS — Z13.29 SCREENING FOR THYROID DISORDER: ICD-10-CM

## 2021-03-23 DIAGNOSIS — R12 HEARTBURN: ICD-10-CM

## 2021-03-23 DIAGNOSIS — Z13.220 SCREENING FOR LIPOID DISORDERS: ICD-10-CM

## 2021-03-23 DIAGNOSIS — E55.9 VITAMIN D DEFICIENCY DISEASE: Primary | ICD-10-CM

## 2021-03-23 DIAGNOSIS — M81.0 AGE-RELATED OSTEOPOROSIS WITHOUT CURRENT PATHOLOGICAL FRACTURE: ICD-10-CM

## 2021-03-23 DIAGNOSIS — Z13.1 SCREENING FOR DIABETES MELLITUS: ICD-10-CM

## 2021-03-23 DIAGNOSIS — R79.9 ABNORMAL FINDING OF BLOOD CHEMISTRY, UNSPECIFIED: ICD-10-CM

## 2021-03-23 DIAGNOSIS — J06.9 ACUTE URI: ICD-10-CM

## 2021-03-23 PROCEDURE — 99214 OFFICE O/P EST MOD 30 MIN: CPT | Performed by: NURSE PRACTITIONER

## 2021-03-24 ENCOUNTER — TELEPHONE (OUTPATIENT)
Dept: FAMILY MEDICINE CLINIC | Facility: CLINIC | Age: 66
End: 2021-03-24

## 2021-03-24 RX ORDER — METHYLPREDNISOLONE 4 MG/1
TABLET ORAL
Qty: 21 EACH | Refills: 0 | Status: SHIPPED | OUTPATIENT
Start: 2021-03-24 | End: 2021-08-18

## 2021-03-24 NOTE — TELEPHONE ENCOUNTER
Pt stated that she is using her Allerga D and her mist, but will like for you to send in the steroid.

## 2021-03-24 NOTE — TELEPHONE ENCOUNTER
Pt called sated that she has fluid in her ears she mentioned it yesterday. She wants to know if you will send something in fro this to [facility] in [place].

## 2021-03-24 NOTE — TELEPHONE ENCOUNTER
I can send in a steroid if she would like to try that.  She is already taking Allegra D which will help with this.  I can also send a nasal spray if this is something she would feel comfortable trying that, too.

## 2021-03-29 ENCOUNTER — LAB (OUTPATIENT)
Dept: LAB | Facility: HOSPITAL | Age: 66
End: 2021-03-29

## 2021-03-29 LAB
25(OH)D3 SERPL-MCNC: 24.1 NG/ML
ALBUMIN SERPL-MCNC: 4.2 G/DL (ref 3.5–5.2)
ALBUMIN/GLOB SERPL: 1.6 G/DL
ALP SERPL-CCNC: 112 U/L (ref 39–117)
ALT SERPL W P-5'-P-CCNC: 21 U/L (ref 1–33)
ANION GAP SERPL CALCULATED.3IONS-SCNC: 5 MMOL/L (ref 5–15)
AST SERPL-CCNC: 24 U/L (ref 1–32)
BACTERIA UR QL AUTO: ABNORMAL /HPF
BASOPHILS # BLD AUTO: 0.03 10*3/MM3 (ref 0–0.2)
BASOPHILS NFR BLD AUTO: 0.7 % (ref 0–1.5)
BILIRUB SERPL-MCNC: 0.3 MG/DL (ref 0–1.2)
BILIRUB UR QL STRIP: NEGATIVE
BUN SERPL-MCNC: 12 MG/DL (ref 8–23)
BUN/CREAT SERPL: 14.8 (ref 7–25)
CALCIUM SPEC-SCNC: 9.5 MG/DL (ref 8.6–10.5)
CHLORIDE SERPL-SCNC: 102 MMOL/L (ref 98–107)
CHOLEST SERPL-MCNC: 201 MG/DL (ref 0–200)
CLARITY UR: CLEAR
CO2 SERPL-SCNC: 30 MMOL/L (ref 22–29)
COLOR UR: YELLOW
CREAT SERPL-MCNC: 0.81 MG/DL (ref 0.57–1)
DEPRECATED RDW RBC AUTO: 40 FL (ref 37–54)
EOSINOPHIL # BLD AUTO: 0.29 10*3/MM3 (ref 0–0.4)
EOSINOPHIL NFR BLD AUTO: 6.9 % (ref 0.3–6.2)
ERYTHROCYTE [DISTWIDTH] IN BLOOD BY AUTOMATED COUNT: 12.6 % (ref 12.3–15.4)
GFR SERPL CREATININE-BSD FRML MDRD: 70 ML/MIN/1.73
GLOBULIN UR ELPH-MCNC: 2.6 GM/DL
GLUCOSE SERPL-MCNC: 92 MG/DL (ref 65–99)
GLUCOSE UR STRIP-MCNC: NEGATIVE MG/DL
HBA1C MFR BLD: 5.63 % (ref 4.8–5.6)
HCT VFR BLD AUTO: 39.9 % (ref 34–46.6)
HDLC SERPL-MCNC: 77 MG/DL (ref 40–60)
HGB BLD-MCNC: 13.7 G/DL (ref 12–15.9)
HGB UR QL STRIP.AUTO: NEGATIVE
HYALINE CASTS UR QL AUTO: ABNORMAL /LPF
IMM GRANULOCYTES # BLD AUTO: 0.01 10*3/MM3 (ref 0–0.05)
IMM GRANULOCYTES NFR BLD AUTO: 0.2 % (ref 0–0.5)
KETONES UR QL STRIP: NEGATIVE
LDLC SERPL CALC-MCNC: 106 MG/DL (ref 0–100)
LDLC/HDLC SERPL: 1.34 {RATIO}
LEUKOCYTE ESTERASE UR QL STRIP.AUTO: ABNORMAL
LYMPHOCYTES # BLD AUTO: 1.15 10*3/MM3 (ref 0.7–3.1)
LYMPHOCYTES NFR BLD AUTO: 27.5 % (ref 19.6–45.3)
MCH RBC QN AUTO: 30.2 PG (ref 26.6–33)
MCHC RBC AUTO-ENTMCNC: 34.3 G/DL (ref 31.5–35.7)
MCV RBC AUTO: 88.1 FL (ref 79–97)
MONOCYTES # BLD AUTO: 0.35 10*3/MM3 (ref 0.1–0.9)
MONOCYTES NFR BLD AUTO: 8.4 % (ref 5–12)
NEUTROPHILS NFR BLD AUTO: 2.35 10*3/MM3 (ref 1.7–7)
NEUTROPHILS NFR BLD AUTO: 56.3 % (ref 42.7–76)
NITRITE UR QL STRIP: NEGATIVE
NRBC BLD AUTO-RTO: 0 /100 WBC (ref 0–0.2)
PH UR STRIP.AUTO: 6 [PH] (ref 5–8)
PLATELET # BLD AUTO: 273 10*3/MM3 (ref 140–450)
PMV BLD AUTO: 11 FL (ref 6–12)
POTASSIUM SERPL-SCNC: 4.5 MMOL/L (ref 3.5–5.2)
PROT SERPL-MCNC: 6.8 G/DL (ref 6–8.5)
PROT UR QL STRIP: NEGATIVE
RBC # BLD AUTO: 4.53 10*6/MM3 (ref 3.77–5.28)
RBC # UR: ABNORMAL /HPF
REF LAB TEST METHOD: ABNORMAL
SODIUM SERPL-SCNC: 137 MMOL/L (ref 136–145)
SP GR UR STRIP: 1.02 (ref 1–1.03)
SQUAMOUS #/AREA URNS HPF: ABNORMAL /HPF
TRIGL SERPL-MCNC: 103 MG/DL (ref 0–150)
UROBILINOGEN UR QL STRIP: ABNORMAL
VLDLC SERPL-MCNC: 18 MG/DL (ref 5–40)
WBC # BLD AUTO: 4.18 10*3/MM3 (ref 3.4–10.8)
WBC UR QL AUTO: ABNORMAL /HPF

## 2021-03-29 PROCEDURE — 83036 HEMOGLOBIN GLYCOSYLATED A1C: CPT | Performed by: NURSE PRACTITIONER

## 2021-03-29 PROCEDURE — 80053 COMPREHEN METABOLIC PANEL: CPT | Performed by: NURSE PRACTITIONER

## 2021-03-29 PROCEDURE — 82306 VITAMIN D 25 HYDROXY: CPT | Performed by: NURSE PRACTITIONER

## 2021-03-29 PROCEDURE — 85025 COMPLETE CBC W/AUTO DIFF WBC: CPT | Performed by: NURSE PRACTITIONER

## 2021-03-29 PROCEDURE — 80061 LIPID PANEL: CPT | Performed by: NURSE PRACTITIONER

## 2021-03-29 PROCEDURE — 81001 URINALYSIS AUTO W/SCOPE: CPT | Performed by: NURSE PRACTITIONER

## 2021-03-30 RX ORDER — MECLIZINE HYDROCHLORIDE 25 MG/1
TABLET ORAL
Qty: 270 TABLET | Refills: 1 | Status: SHIPPED | OUTPATIENT
Start: 2021-03-30 | End: 2022-04-12

## 2021-04-02 ENCOUNTER — TELEPHONE (OUTPATIENT)
Dept: FAMILY MEDICINE CLINIC | Facility: CLINIC | Age: 66
End: 2021-04-02

## 2021-04-02 NOTE — TELEPHONE ENCOUNTER
----- Message from TEDDY Zelaya sent at 4/1/2021 11:07 AM CDT -----  Normal electrolytes, kidney and liver function.  Her cholesterol levels are elevated.  I would recommend starting on a low dose cholesterol medication at bedtime if she is open to this?  Vitamin D remains slightly low.  Make sure that she is taking a vitamin D3 supplement once daily.  A1C is slightly elevated at 5.63.  Monitor diet closely and avoid excess sugars and high fructose corn syrup.        Pt stated that she will work on her diet and would like wait on starting on Cholesterol medication.

## 2021-04-07 ENCOUNTER — TELEPHONE (OUTPATIENT)
Dept: FAMILY MEDICINE CLINIC | Facility: CLINIC | Age: 66
End: 2021-04-07

## 2021-04-07 NOTE — TELEPHONE ENCOUNTER
Ms Johnston called in stating that [facility] told her that we need to call and schedule her bone shot and that they need a new order for it.

## 2021-04-07 NOTE — TELEPHONE ENCOUNTER
Pt is needing a new order for the Bone Clinic at [facility] to be able to get her Prolia injection. Pt was informed you are out of the office and will return on 4/13/21.

## 2021-04-12 NOTE — PROGRESS NOTES
Subjective   Genesis Salgado is a 66 y.o. female.     She presents today for her routine follow-up on chronic medical problems.  She reports that her allergy symptoms are currently fairly well controlled.  Her motion sickness is also well controlled.  She continues to follow-up with rheumatology.  She does need refills on some of her routine oral medications.  She is due for routine fasting labs.  She is otherwise without any other new complaints.    Heartburn  She complains of belching and heartburn. She reports no abdominal pain, no chest pain, no choking, no coughing, no dysphagia, no early satiety, no globus sensation, no hoarse voice, no nausea, no sore throat, no stridor, no tooth decay, no water brash or no wheezing. This is a recurrent problem. The current episode started more than 1 month ago. The problem occurs frequently. The problem has been waxing and waning. Pertinent negatives include no anemia, melena, muscle weakness, orthopnea or weight loss. Risk factors include hiatal hernia. She has tried a PPI for the symptoms. The treatment provided moderate relief.        The following portions of the patient's history were reviewed and updated as appropriate: allergies, current medications, past family history, past medical history, past social history, past surgical history and problem list.    Review of Systems   Constitutional: Negative.  Negative for unexpected weight loss.   HENT: Negative.  Negative for hoarse voice and sore throat.    Eyes: Negative.    Respiratory: Negative.  Negative for cough, choking and wheezing.    Cardiovascular: Negative.  Negative for chest pain.   Gastrointestinal: Negative for abdominal pain, dysphagia, melena and nausea.   Musculoskeletal: Negative.  Negative for muscle weakness.   Skin: Negative.    Allergic/Immunologic: Negative.    Neurological: Negative.    Hematological: Negative.    Psychiatric/Behavioral: Negative.        Objective   Physical Exam  Vitals  reviewed.   Constitutional:       General: She is not in acute distress.     Appearance: She is well-developed. She is not diaphoretic.   HENT:      Head: Normocephalic.      Right Ear: External ear normal.      Left Ear: External ear normal.      Nose: Nose normal.   Eyes:      Pupils: Pupils are equal, round, and reactive to light.   Neck:      Thyroid: No thyromegaly.      Vascular: No JVD.   Cardiovascular:      Rate and Rhythm: Normal rate and regular rhythm.      Heart sounds: No murmur heard.   No friction rub. No gallop.    Pulmonary:      Effort: Pulmonary effort is normal. No respiratory distress.      Breath sounds: Normal breath sounds. No wheezing or rales.   Musculoskeletal:         General: Normal range of motion.      Cervical back: Normal range of motion and neck supple.   Skin:     General: Skin is warm and dry.      Coloration: Skin is not pale.      Findings: No erythema or rash.   Neurological:      Mental Status: She is alert and oriented to person, place, and time.   Psychiatric:         Behavior: Behavior normal.         Thought Content: Thought content normal.         Judgment: Judgment normal.           Assessment/Plan   Diagnoses and all orders for this visit:    1. Vitamin D deficiency disease (Primary)  -     CBC & Differential  -     Comprehensive Metabolic Panel  -     Hemoglobin A1c  -     Lipid Panel  -     Vitamin D 25 Hydroxy    2. Screening for diabetes mellitus  -     CBC & Differential  -     Comprehensive Metabolic Panel  -     Hemoglobin A1c  -     Lipid Panel  -     Vitamin D 25 Hydroxy    3. Screening for lipoid disorders  -     CBC & Differential  -     Comprehensive Metabolic Panel  -     Hemoglobin A1c  -     Lipid Panel  -     Vitamin D 25 Hydroxy    4. Screening for thyroid disorder  -     CBC & Differential  -     Comprehensive Metabolic Panel  -     Hemoglobin A1c  -     Lipid Panel  -     Vitamin D 25 Hydroxy    5. Abnormal finding of blood chemistry, unspecified   -      Hemoglobin A1c  -     Lipid Panel    6. Acute URI    7. Heartburn    8. Age-related osteoporosis without current pathological fracture      Lab Results   Component Value Date    WBC 4.18 03/29/2021    HGB 13.7 03/29/2021    HCT 39.9 03/29/2021    MCV 88.1 03/29/2021     03/29/2021     Lab Results   Component Value Date    GLUCOSE 92 03/29/2021    BUN 12 03/29/2021    CREATININE 0.81 03/29/2021    EGFRIFNONA 70 03/29/2021    BCR 14.8 03/29/2021    K 4.5 03/29/2021    CO2 30.0 (H) 03/29/2021    CALCIUM 9.5 03/29/2021    ALBUMIN 4.20 03/29/2021    AST 24 03/29/2021    ALT 21 03/29/2021     Lab Results   Component Value Date    HGBA1C 5.63 (H) 03/29/2021     Lab Results   Component Value Date    TSH 1.240 09/18/2020     Lab Results   Component Value Date    CHOL 201 (H) 03/29/2021    TRIG 103 03/29/2021    HDL 77 (H) 03/29/2021     (H) 03/29/2021              Patient's Body mass index is 24.45 kg/m². BMI is within normal parameters. No follow-up required..  Continue with Prolia injection at [facility] outpatient infusion center for management of age related osteoporosis.  Fasting labs.  Continue current medications.  Follow up as scheduled for routine follow up.  Follow up sooner for problems/concerns.  Patient verbalized understanding and agreement with plan of care.        This document has been electronically signed by TEDDY Zelaya on April 11, 2021 21:03 CDT

## 2021-04-14 NOTE — TELEPHONE ENCOUNTER
There is likely a form that needs to be filled out.  Can you contact [facility] outpatient infusion department and find out what needs to be sent?

## 2021-04-14 NOTE — TELEPHONE ENCOUNTER
Left a message for Mari at the [facility] outpatient infusion clinic to call the office or to fax a form if needed to be filled out.

## 2021-04-14 NOTE — TELEPHONE ENCOUNTER
Just requires a written order on a [facility] order form and an office note that mentions dx and need within last 6 months. Order ready to be signed.

## 2021-05-26 DIAGNOSIS — E55.9 VITAMIN D DEFICIENCY DISEASE: ICD-10-CM

## 2021-05-27 RX ORDER — ERGOCALCIFEROL 1.25 MG/1
CAPSULE ORAL
Qty: 12 CAPSULE | Refills: 3 | Status: SHIPPED | OUTPATIENT
Start: 2021-05-27 | End: 2022-04-20

## 2021-06-15 ENCOUNTER — TELEPHONE (OUTPATIENT)
Dept: FAMILY MEDICINE CLINIC | Facility: CLINIC | Age: 66
End: 2021-06-15

## 2021-06-17 ENCOUNTER — TELEPHONE (OUTPATIENT)
Dept: FAMILY MEDICINE CLINIC | Facility: CLINIC | Age: 66
End: 2021-06-17

## 2021-07-19 DIAGNOSIS — M32.9 SYSTEMIC LUPUS ERYTHEMATOSUS, UNSPECIFIED SLE TYPE, UNSPECIFIED ORGAN INVOLVEMENT STATUS (HCC): ICD-10-CM

## 2021-07-20 RX ORDER — HYDROXYCHLOROQUINE SULFATE 200 MG/1
200 TABLET, FILM COATED ORAL DAILY
Qty: 90 TABLET | Refills: 0 | OUTPATIENT
Start: 2021-07-20

## 2021-08-18 ENCOUNTER — OFFICE VISIT (OUTPATIENT)
Dept: FAMILY MEDICINE CLINIC | Facility: CLINIC | Age: 66
End: 2021-08-18

## 2021-08-18 VITALS
HEART RATE: 74 BPM | SYSTOLIC BLOOD PRESSURE: 140 MMHG | TEMPERATURE: 97.1 F | DIASTOLIC BLOOD PRESSURE: 70 MMHG | HEIGHT: 68 IN | RESPIRATION RATE: 20 BRPM | WEIGHT: 153.7 LBS | BODY MASS INDEX: 23.3 KG/M2 | OXYGEN SATURATION: 97 %

## 2021-08-18 DIAGNOSIS — M32.8 OTHER FORMS OF SYSTEMIC LUPUS ERYTHEMATOSUS, UNSPECIFIED ORGAN INVOLVEMENT STATUS (HCC): Primary | ICD-10-CM

## 2021-08-18 DIAGNOSIS — Z00.00 MEDICARE ANNUAL WELLNESS VISIT, SUBSEQUENT: Primary | ICD-10-CM

## 2021-08-18 PROCEDURE — G0439 PPPS, SUBSEQ VISIT: HCPCS | Performed by: NURSE PRACTITIONER

## 2021-08-18 NOTE — PROGRESS NOTES
The ABCs of the Annual Wellness Visit  Subsequent Medicare Wellness Visit    Chief Complaint   Patient presents with   • Annual Exam     Medicare Wellness Subsequent       Subjective   History of Present Illness:  Genesis Salgado is a 66 y.o. female who presents for a Subsequent Medicare Wellness Visit.    HEALTH RISK ASSESSMENT    Recent Hospitalizations:  No hospitalization(s) within the last year.    Current Medical Providers:  Patient Care Team:  Daria Douglass APRN as PCP - General (Family Medicine)    Smoking Status:  Social History     Tobacco Use   Smoking Status Never Smoker   Smokeless Tobacco Never Used       Alcohol Consumption:  Social History     Substance and Sexual Activity   Alcohol Use No       Depression Screen:   PHQ-2/PHQ-9 Depression Screening 8/18/2021   Little interest or pleasure in doing things 0   Feeling down, depressed, or hopeless 0   Total Score 0       Fall Risk Screen:  STEADI Fall Risk Assessment was completed, and patient is at LOW risk for falls.Assessment completed on:8/18/2021    Health Habits and Functional and Cognitive Screening:  Functional & Cognitive Status 8/18/2021   Do you have difficulty preparing food and eating? No   Do you have difficulty bathing yourself, getting dressed or grooming yourself? No   Do you have difficulty using the toilet? No   Do you have difficulty moving around from place to place? No   Do you have trouble with steps or getting out of a bed or a chair? No   Current Diet Limited Junk Food   Dental Exam Up to date   Eye Exam Up to date   Exercise (times per week) -   Current Exercises Include Yard Work   Current Exercise Activities Include -   Do you need help using the phone?  No   Are you deaf or do you have serious difficulty hearing?  No   Do you need help with transportation? No   Do you need help shopping? No   Do you need help preparing meals?  No   Do you need help with housework?  No   Do you need help with laundry? No   Do you need help  taking your medications? No   Do you need help managing money? No   Do you ever drive or ride in a car without wearing a seat belt? No   Have you felt unusual stress, anger or loneliness in the last month? No   Who do you live with? Spouse   If you need help, do you have trouble finding someone available to you? No   Have you been bothered in the last four weeks by sexual problems? -   Do you have difficulty concentrating, remembering or making decisions? No         Does the patient have evidence of cognitive impairment? No    Asprin use counseling:Does not need ASA (and currently is not on it)    Age-appropriate Screening Schedule:  Refer to the list below for future screening recommendations based on patient's age, sex and/or medical conditions. Orders for these recommended tests are listed in the plan section. The patient has been provided with a written plan.    Health Maintenance   Topic Date Due   • TDAP/TD VACCINES (1 - Tdap) Never done   • PAP SMEAR  02/22/2021   • ZOSTER VACCINE (1 of 2) 09/01/2023 (Originally 1/21/2003)   • INFLUENZA VACCINE  10/01/2021   • MAMMOGRAM  05/21/2022   • DXA SCAN  05/21/2022          The following portions of the patient's history were reviewed and updated as appropriate: allergies, current medications, past family history, past medical history, past social history, past surgical history and problem list.    Outpatient Medications Prior to Visit   Medication Sig Dispense Refill   • dexlansoprazole (Dexilant) 60 MG capsule Take 1 capsule by mouth Daily. 90 capsule 3   • fexofenadine-pseudoephedrine (ALLEGRA-D 24) 180-240 MG per 24 hr tablet Take 1 tablet by mouth Daily. 90 tablet 0   • hydroxychloroquine (PLAQUENIL) 200 MG tablet TAKE 1 TABLET DAILY 90 tablet 0   • meclizine (ANTIVERT) 25 MG tablet TAKE 1 TABLET 3 TIMES A DAYAS NEEDED FOR DIZZINESS 270 tablet 1   • vitamin D (ERGOCALCIFEROL) 1.25 MG (42832 UT) capsule capsule TAKE 1 CAPSULE EVERY 7 DAYS 12 capsule 3   •  "methylPREDNISolone (MEDROL) 4 MG dose pack Take as directed on package instructions. 21 each 0     No facility-administered medications prior to visit.       Patient Active Problem List   Diagnosis   • Systemic lupus erythematosus (CMS/[facility])   • Vitamin D deficiency disease   • History of osteoporosis   • Acute serous otitis media of left ear   • Seasonal allergic rhinitis   • Hoarseness   • Acute nonintractable headache   • Thumb pain, right   • Swelling of right thumb       Advanced Care Planning:  ACP discussion was held with the patient during this visit. Patient has an advance directive (not in EMR), copy requested.    Review of Systems    Compared to one year ago, the patient feels her physical health is better.  Compared to one year ago, the patient feels her mental health is better.    Reviewed chart for potential of high risk medication in the elderly: yes  Reviewed chart for potential of harmful drug interactions in the elderly:yes    Objective         Vitals:    08/18/21 1204   BP: 140/70   BP Location: Left arm   Patient Position: Sitting   Cuff Size: Adult   Pulse: 74   Resp: 20   Temp: 97.1 °F (36.2 °C)   TempSrc: Temporal   SpO2: 97%   Weight: 69.7 kg (153 lb 11.2 oz)   Height: 172.7 cm (68\")   PainSc: 0-No pain       Body mass index is 23.37 kg/m².  Discussed the patient's BMI with her. The BMI is in the acceptable range.    Physical Exam          Assessment/Plan   Medicare Risks and Personalized Health Plan  CMS Preventative Services Quick Reference  Advance Directive Discussion  Alcohol Misuse  Breast Cancer/Mammogram Screening  Cardiovascular risk  Chronic Pain   Dementia/Memory   Depression/Dysphoria  Fall Risk  Glaucoma Risk  Hearing Problem  Immunizations Discussed/Encouraged (specific immunizations; Tdap and Pneumococcal 23 )  Inadequate Social Support, Isolation, Loneliness, Lack of Transportation, Financial Difficulties, or Caregiver Stress   Inactivity/Sedentary  Osteoporosis " Risk  Polypharmacy    The above risks/problems have been discussed with the patient.  Pertinent information has been shared with the patient in the After Visit Summary.  Follow up plans and orders are seen below in the Assessment/Plan Section.    Diagnoses and all orders for this visit:    1. Medicare annual wellness visit, subsequent (Primary)      Follow Up:  Return in about 1 year (around 8/18/2022).   Patient declines TDAP, PCV23, and PAP smear.  See attached documents.  An After Visit Summary and PPPS were given to the patient.        This document has been electronically signed by TEDDY Zelaya on August 18, 2021 14:05 CDT

## 2021-08-18 NOTE — PATIENT INSTRUCTIONS
Medicare Wellness  Personal Prevention Plan of Service     Date of Office Visit:  2021  Encounter Provider:  TEDDY Zelaya  Place of Service:  [facility]  Patient Name: Genesis Salgado  :  1955    As part of the Medicare Wellness portion of your visit today, we are providing you with this personalized preventive plan of services (PPPS). This plan is based upon recommendations of the [place] Preventive Services Task Force (USPSTF) and the Advisory Committee on Immunization Practices (ACIP).    This lists the preventive care services that should be considered, and provides dates of when you are due. Items listed as completed are up-to-date and do not require any further intervention.    Health Maintenance   Topic Date Due   • TDAP/TD VACCINES (1 - Tdap) Never done   • Pneumococcal Vaccine 65+ (1 of 1 - PPSV23) Never done   • PAP SMEAR  2021   • ZOSTER VACCINE (1 of 2) 2023 (Originally 2003)   • INFLUENZA VACCINE  10/01/2021   • MAMMOGRAM  2022   • DXA SCAN  2022   • ANNUAL WELLNESS VISIT  2022   • COLORECTAL CANCER SCREENING  10/30/2025   • HEPATITIS C SCREENING  Completed   • COVID-19 Vaccine  Completed       No orders of the defined types were placed in this encounter.      Return in about 1 year (around 2022).

## 2021-08-19 DIAGNOSIS — J06.9 ACUTE URI: ICD-10-CM

## 2021-08-19 DIAGNOSIS — M32.9 SYSTEMIC LUPUS ERYTHEMATOSUS, UNSPECIFIED SLE TYPE, UNSPECIFIED ORGAN INVOLVEMENT STATUS (HCC): ICD-10-CM

## 2021-08-19 RX ORDER — FEXOFENADINE HCL AND PSEUDOEPHEDRINE HCI 180; 240 MG/1; MG/1
1 TABLET, EXTENDED RELEASE ORAL DAILY
Qty: 90 TABLET | Refills: 1 | Status: SHIPPED | OUTPATIENT
Start: 2021-08-19 | End: 2021-10-25 | Stop reason: SDUPTHER

## 2021-08-19 RX ORDER — HYDROXYCHLOROQUINE SULFATE 200 MG/1
200 TABLET, FILM COATED ORAL DAILY
Qty: 90 TABLET | Refills: 0 | OUTPATIENT
Start: 2021-08-19

## 2021-09-10 DIAGNOSIS — R12 HEARTBURN: ICD-10-CM

## 2021-09-10 RX ORDER — DEXLANSOPRAZOLE 60 MG/1
CAPSULE, DELAYED RELEASE ORAL
Qty: 90 CAPSULE | Refills: 1 | Status: SHIPPED | OUTPATIENT
Start: 2021-09-10 | End: 2022-01-06

## 2021-09-10 NOTE — TELEPHONE ENCOUNTER
Rx Refill Note  Requested Prescriptions     Pending Prescriptions Disp Refills   • Dexilant 60 MG capsule [Pharmacy Med Name: DEXILANT DR 60 MG CAPSULE] 90 capsule 3     Sig: TAKE 1 CAPSULE BY MOUTH EVERY DAY      Last office visit with prescribing clinician: 8/18/2021      Next office visit with prescribing clinician: 9/21/2021   {TIP  Encounters:      Proton Pump Inhibitors Protocol Kiwicc91/10/2021 12:52 AM   No osteoporosis diagnosis on problem list         {TIP  Please add Last Relevant Lab Date if appropriate  {TIP  Is Refill Pharmacy correct? YES  Theo Sorenson LPN  09/10/21, 08:26 CDT

## 2021-09-14 DIAGNOSIS — J06.9 ACUTE URI: ICD-10-CM

## 2021-09-14 RX ORDER — FEXOFENADINE HCL AND PSEUDOEPHEDRINE HCI 180; 240 MG/1; MG/1
TABLET, EXTENDED RELEASE ORAL
Qty: 20 TABLET | Refills: 4 | OUTPATIENT
Start: 2021-09-14

## 2021-09-14 NOTE — TELEPHONE ENCOUNTER
Rx Refill Note  Requested Prescriptions     Refused Prescriptions Disp Refills   • fexofenadine-pseudoephedrine (ALLEGRA-D 24) 180-240 MG per 24 hr tablet [Pharmacy Med Name: ALLEGRA-D 24 HOUR TABLET] 20 tablet 4     Sig: TAKE 1 TABLET BY MOUTH EVERY DAY      Last office visit with prescribing clinician: 8/18/2021      Next office visit with prescribing clinician: 9/21/2021   {TIP  Encounters:      RX SENT ON 8/19/21 # 90 WITH ONE REFILL    {TIP  Please add Last Relevant Lab Date if appropriate  {TIP  Is Refill Pharmacy correct? YES  Theo Sorenson, SIMBA  09/14/21, 10:36 CDT

## 2021-09-21 ENCOUNTER — OFFICE VISIT (OUTPATIENT)
Dept: FAMILY MEDICINE CLINIC | Facility: CLINIC | Age: 66
End: 2021-09-21

## 2021-09-21 VITALS
HEIGHT: 68 IN | RESPIRATION RATE: 20 BRPM | SYSTOLIC BLOOD PRESSURE: 122 MMHG | TEMPERATURE: 97.7 F | HEART RATE: 78 BPM | OXYGEN SATURATION: 98 % | BODY MASS INDEX: 23.49 KG/M2 | DIASTOLIC BLOOD PRESSURE: 70 MMHG | WEIGHT: 155 LBS

## 2021-09-21 DIAGNOSIS — M32.8 OTHER FORMS OF SYSTEMIC LUPUS ERYTHEMATOSUS, UNSPECIFIED ORGAN INVOLVEMENT STATUS (HCC): ICD-10-CM

## 2021-09-21 DIAGNOSIS — M65.351 TRIGGER LITTLE FINGER OF RIGHT HAND: Primary | ICD-10-CM

## 2021-09-21 DIAGNOSIS — Z12.31 ENCOUNTER FOR SCREENING MAMMOGRAM FOR BREAST CANCER: ICD-10-CM

## 2021-09-21 DIAGNOSIS — E55.9 VITAMIN D DEFICIENCY DISEASE: ICD-10-CM

## 2021-09-21 PROCEDURE — 99214 OFFICE O/P EST MOD 30 MIN: CPT | Performed by: NURSE PRACTITIONER

## 2021-10-06 ENCOUNTER — TELEPHONE (OUTPATIENT)
Dept: FAMILY MEDICINE CLINIC | Facility: CLINIC | Age: 66
End: 2021-10-06

## 2021-10-06 NOTE — TELEPHONE ENCOUNTER
Patient called and left voice message asking the status of her issue with her trigger finger and would like a return call

## 2021-10-07 NOTE — TELEPHONE ENCOUNTER
Pt was informed of her appointment with Dr Katt Chong on 10/27/2021 at 1:00 pm in [facility] [phone] fax # [phone].

## 2021-10-11 NOTE — PROGRESS NOTES
Subjective   Genesis Salgado is a 66 y.o. female.     She presents today for routine follow-up on chronic medical problems.  She does have an upcoming appointment scheduled with rheumatology for management of her chronic systemic lupus.  She is gained 2 pounds since her last office visit.  Her BMI is currently 23.6%.  She is due for routine screening mammogram.  She has lost a close friend recently.  This has been difficult for her.  She does have a history of right hand pain.  She also has a history of trigger finger in her right hand.  She is experiencing symptoms in her fifth digit.  She would like a referral to a hand surgeon at this time.  She is otherwise without any other new complaints today in the office.    Hand Pain   The incident occurred more than 1 week ago. There was no injury mechanism. The pain is present in the right hand. The pain is mild. Pertinent negatives include no chest pain, muscle weakness, numbness or tingling. The treatment provided no relief.        The following portions of the patient's history were reviewed and updated as appropriate: allergies, current medications, past family history, past medical history, past social history, past surgical history and problem list.    Review of Systems   Constitutional: Positive for fatigue.   HENT: Negative.    Eyes: Negative.    Respiratory: Negative.    Cardiovascular: Negative.  Negative for chest pain.   Gastrointestinal: Negative.    Musculoskeletal: Positive for arthralgias, joint swelling and myalgias.   Skin: Negative.    Allergic/Immunologic: Negative.    Neurological: Negative.  Negative for tingling and numbness.   Hematological: Negative.    Psychiatric/Behavioral: Negative.  Positive for depressed mood.       Objective   Physical Exam  Vitals reviewed.   Constitutional:       General: She is not in acute distress.     Appearance: She is well-developed. She is not diaphoretic.   HENT:      Head: Normocephalic.      Right Ear:  External ear normal.      Left Ear: External ear normal.      Nose: Nose normal.   Eyes:      Pupils: Pupils are equal, round, and reactive to light.   Neck:      Thyroid: No thyromegaly.      Vascular: No JVD.   Cardiovascular:      Rate and Rhythm: Normal rate and regular rhythm.      Heart sounds: No murmur heard.  No friction rub. No gallop.    Pulmonary:      Effort: Pulmonary effort is normal. No respiratory distress.      Breath sounds: Normal breath sounds. No wheezing or rales.   Musculoskeletal:      Right hand: Deformity and tenderness present. Decreased range of motion.      Cervical back: Normal range of motion and neck supple.   Skin:     General: Skin is warm and dry.      Coloration: Skin is not pale.      Findings: No erythema or rash.   Neurological:      Mental Status: She is alert and oriented to person, place, and time.   Psychiatric:         Behavior: Behavior normal.         Thought Content: Thought content normal.         Judgment: Judgment normal.           Assessment/Plan   Diagnoses and all orders for this visit:    1. Trigger little finger of right hand (Primary)  -     Ambulatory Referral to Hand Surgery    2. Encounter for screening mammogram for breast cancer  -     Mammo Screening Bilateral With CAD; Future    3. Other forms of systemic lupus erythematosus, unspecified organ involvement status (HCC)    4. Vitamin D deficiency disease               Patient's Body mass index is 23.57 kg/m². indicating that she is within normal range (BMI 18.5-24.9). No BMI management plan needed..    Referral to hand surgery for trigger finger of the fifth digit on the right hand.  Schedule for screening mammogram.  Keep scheduled appointment with rheumatology.  Continue current medications.  Follow up as scheduled for routine follow up.  Follow up sooner for problems/concerns.  Patient verbalized understanding and agreement with plan of care.        This document has been electronically signed by Daria  TEDDY Morocho on October 10, 2021 20:43 CDT

## 2021-10-12 ENCOUNTER — TELEPHONE (OUTPATIENT)
Dept: FAMILY MEDICINE CLINIC | Facility: CLINIC | Age: 66
End: 2021-10-12

## 2021-10-25 DIAGNOSIS — J06.9 ACUTE URI: ICD-10-CM

## 2021-10-25 NOTE — TELEPHONE ENCOUNTER
Returned abbi to Pt she stated she would like to start receiving her Prolia injections again. I left a message with Mari at the [facility] outpatient Infushion clinic to give me a call to see what needs to be done so the Pt can start back receiving those injections.

## 2021-10-27 RX ORDER — FEXOFENADINE HCL AND PSEUDOEPHEDRINE HCI 180; 240 MG/1; MG/1
1 TABLET, EXTENDED RELEASE ORAL DAILY
Qty: 90 TABLET | Refills: 1 | Status: SHIPPED | OUTPATIENT
Start: 2021-10-27 | End: 2021-12-07 | Stop reason: SDUPTHER

## 2021-11-01 NOTE — THERAPY EVALUATION
Outpatient Physical Therapy Ortho Initial Evaluation  [facility]  Ernestina Good, PT, DPT, CSCS       Patient Name: Genesis Salgado  : 1955  MRN: [id number]  Today's Date: 2018      Visit Date: 2018     Pt reports 0/10 pain pre treatment, 0/10 pain post treatment  Reports N/A% of improvement.  Attended  visits.  Insurance available: medicare guidelines  Next MD appt: ANTWAN .  Recertification: 2018.    Patient Active Problem List   Diagnosis   • Systemic lupus erythematosus (CMS/HCC)   • Vitamin D deficiency disease   • History of osteoporosis        Past Medical History:   Diagnosis Date   • Adie's syndrome    • Cancer (CMS/HCC)     colon cancer   • Colon cancer (CMS/HCC)    • Hiatal hernia    • Lupus    • Osteoporosis    • Ulcerative colitis (CMS/HCC)         Past Surgical History:   Procedure Laterality Date   • BACK SURGERY     • BILATERAL BREAST REDUCTION     • CATARACT EXTRACTION W/ INTRAOCULAR LENS IMPLANT     • CHOLECYSTECTOMY     • COLON RESECTION     • COLON SURGERY     • FIXATION KYPHOPLASTY THORACIC SPINE      T12   • LUMBAR DISCECTOMY     • TRIGGER FINGER RELEASE Right        Visit Dx:     ICD-10-CM ICD-9-CM   1. Trigger ring finger of right hand M65.341 727.03     Number of days off work: N/A    Patient is .    Patient has grown children.    Medications (Admitted on 2018)     dexlansoprazole (DEXILANT) 60 MG capsule     fexofenadine-pseudoephedrine (ALLEGRA-D 24) 180-240 MG per 24 hr tablet     hydroxychloroquine (PLAQUENIL) 200 MG tablet     meclizine (ANTIVERT) 25 MG tablet     Scopolamine (TRANSDERM-SCOP, 1.5 MG,) 1.5 MG/3DAYS patch     sucralfate (CARAFATE) 1 g tablet     tiZANidine (ZANAFLEX) 4 MG tablet     vitamin D (ERGOCALCIFEROL) 51690 units capsule capsule      Allergies       LatexHives   Neurontin [Gabapentin]Other (See Comments)     Deion as Reviewed Reviewed by You at 8:55 A             Patient History     Row Name  [address] [facility]  eMERGENCY dEPARTMENT eNCOUnter      Pt Name: David Avlarenga  MRN: [id number]  [place] 1959  Date of evaluation: 10/31/2021  Provider: Aden Mahan MD    CHIEF COMPLAINT       Chief Complaint   Patient presents with    Shortness of Breath     increased today, wearing 4L at home most of the time, states used to be only at night, treated for PNA multiple times this fall. HISTORY OF PRESENT ILLNESS   (Location/Symptom, Timing/Onset,Context/Setting, Quality, Duration, Modifying Factors, Severity)  Note limiting factors. David Alvarenga is a 59 y.o. male who presents to the emergency department with complaint of worsening shortness of breath, copious brownish sputum production. He is on home oxygen 4 L. Smokes 1 pack of cigarettes a day. History of alcohol and cocaine abuse, obstructive sleep apnea, seasonal allergies, hyperlipidemia, gouty arthritis, hypertension, arthropathy of both knees, supraventricular tachycardia, morbid obesity with BMI of 40.03, multiple pneumonias and gastroesophageal reflux disease. He denies chest pain, palpitations, orthopnea or PND. He denies fever or chills. Completed 2 Covid vaccines. He was discharged from this hospital 2 weeks ago following hospitalization with COPD exacerbation and pneumonia. First responders found him with oxygen of 96% on room air. He was treated with Solu-Medrol and DuoNeb prior to transportation to the ED. HPI    Nursing Notes were reviewed. REVIEW OF SYSTEMS    (2-9 systems for level 4, 10 or more for level 5)     Review of Systems   Constitutional: Positive for activity change, appetite change and fatigue. Negative for chills and fever. HENT: Negative for congestion, ear discharge, ear pain, hearing loss, rhinorrhea, sinus pressure and sore throat. Eyes: Negative for photophobia, pain and visual disturbance. Respiratory: Positive for cough, chest tightness and shortness of breath.  Negative for apnea "08/01/18 0800             History    Chief Complaint Joint stiffness  -AJ      Date Current Problem(s) Began --   cHRONIC  -AJ      Brief Description of Current Complaint Patient reports she had trigger finger surgery in [place] and since the surgery the finger just hasn't been right. She reports that it still gets stiff and won't extend like she wants it to. She reports it won't \"go back in place\"  -AJ      Previous treatment for THIS PROBLEM Surgery  -      Surgery Date: --   April 2018  -AJ      Patient/Caregiver Goals Improve mobility;Improve strength  -AJ      Current Tobacco Use none  -AJ      Smoking Status Non-smoker  -AJ      Patient's Rating of General Health Good  -AJ      Hand Dominance right-handed  -AJ      Occupation/sports/leisure activities retired; Hobbies: yard work, line dance, ride bicycle  -AJ      Patient seeing anyone else for problem(s)? No  -AJ      Results of Clinical Tests None recent  -AJ      Surgery/Hospitalization Trigger finger release in april 2018  -AJ      History of Previous Related Injuries None  -AJ      Are you or can you be pregnant No  -AJ         Pain     Pain Location Hand  -AJ      Pain at Present 0  -AJ      Pain at Best 0  -AJ      Pain at Worst 4  -AJ      Pain Frequency Rarely  -AJ      Pain Description Aching  -AJ      What Performance Factors Make the Current Problem(s) WORSE? Carrying something or lots of pressur vivian palm.  -AJ      What Performance Factors Make the Current Problem(s) BETTER? Rest  -AJ      Is your sleep disturbed? No  -AJ      Is medication used to assist with sleep? No  -AJ      Difficulties at work? N/A  -AJ      Difficulties with ADL's? None  -AJ      Difficulties with recreational activities? gardening  -AJ        User Key  (r) = Recorded By, (t) = Taken By, (c) = Cosigned By    Initials Name Provider Type    AJ Ernestina Good PT Physical Therapist                PT Ortho     Row Name 08/01/18 0900        Strength Right    # " Reps 2  -AJ    Right Rung 4  -AJ    Right  Test 1 34  -AJ    Right  Test 2 32  -AJ     Strength Average Right 33  -AJ        Strength Left    # Reps 2  -AJ    Left Rung 4  -AJ    Left  Test 1 35  -AJ    Left  Test 2 34  -AJ     Strength Average Left 34.5  -AJ    Row Name 08/01/18 0800       Subjective Comments    Subjective Comments Patient wishes to get it stronger and get full motion back.  -AJ       Precautions and Contraindications    Precautions LATEX ALLERGY  -AJ       Subjective Pain    Able to rate subjective pain? yes  -AJ    Pre-Treatment Pain Level 0  -AJ    Post-Treatment Pain Level 0  -       Posture/Observations    Posture- WNL Posture is WNL   for B UEs  -AJ    Posture/Observations Comments No acute distress, some visable arthritic changes within the hands  -AJ       Wrist/Hand Special Tests    Tightness of extrinsic flexors Right:;Positive;Left:;Negative  -AJ       General ROM    GENERAL ROM COMMENTS AROM for B hands all WNL, except RF MCP extension only to 0°. AROm for B wrists all WNL and symetricla except extension R 54°, L 70°  -AJ       MMT (Manual Muscle Testing)    Additional Documentation --  -       General Assessment (Manual Muscle Testing)    Comment, General Manual Muscle Testing (MMT) Assessment B wrists 5/5, intrinsics for R hand 4/5.  -AJ        Strength Right    # Reps 2  -AJ    Right Rung 2  -AJ    Right  Test 1 30  -AJ    Right  Test 2 40  -AJ     Strength Average Right 35  -AJ        Strength Left    # Reps 2  -AJ    Left Rung 2  -AJ    Left  Test 1 52  -AJ    Left  Test 2 45  -AJ     Strength Average Left 48.5  -AJ       Sensation    Sensation WNL? WNL  -AJ    Light Touch No apparent deficits  -AJ    Additional Comments Karla buitrago UE numbness or tingling  -AJ       Upper Extremity Flexibility    Wrist Extensors Bilateral:;WNL  -AJ    Wrist Flexors Right:;Mildly limited;Left:;WNL  -AJ       Transfers    Comment  (Transfers) I with all transfers.  -       Gait/Stairs Assessment/Training    Comment (Gait/Stairs) FWB, non-antalgic gait, normal arm swing with gait.  -       Hand  Strength     Strength Affected Side Bilateral  -      User Key  (r) = Recorded By, (t) = Taken By, (c) = Cosigned By    Initials Name Provider Type    PIEDAD Good, PT Physical Therapist                      Therapy Education  Given: HEP, Symptoms/condition management, Pain management, Posture/body mechanics, Fall prevention and home safety, Mobility training (POC)  Program: New  How Provided: Verbal, Demonstration, Written  Provided to: Patient  Level of Understanding: Verbalized, Demonstrated           PT OP Goals     Row Name 08/01/18 0900          PT Short Term Goals    STG Date to Achieve 08/22/18  -     STG 1 I with HEP and have additins/changes by next rcertification.  -     STG 2 AROM R wrist extension >= 65°.  -     STG 3 AROm R MF MCP extension >= 10°.  -     STG 4 R  @ #2 setting >= 40# on 2 attempt average.  -     STG 5 R finger intrinsics >= 4+/5.  -        Long Term Goals    LTG Date to Achieve 09/07/18  -     LTG 1 AROm R wrist ext >= 70°.  -     LTG 2 AROm All R hand MCP extension >= 20°.  -     LTG 3 R  @ #2 setting >= 45# on 2 attempt average.  -     LTG 4 R finger intrinsics >= 5/5.  -     LTG 5 I wit final HEP.  -     LTG 6 D/C with a final HEp and free 30 day fitness formula membership.  -        Time Calculation    PT Goal Re-Cert Due Date 08/22/18  -       User Key  (r) = Recorded By, (t) = Taken By, (c) = Cosigned By    Initials Name Provider Type    PIEDAD Good, PT Physical Therapist         Barriers to Rehab:Include significant or possible arthritic/degenerative changes that have occurred within the joints.    Safety Issues: None noted.            PT Assessment/Plan     Row Name 08/01/18 0900          PT Assessment    Functional Limitations Limitation in  0.65 % nasal spray 2 sprays by Nasal route three times daily  Qty: 1 each, Refills: 0      cetirizine (ZYRTEC ALLERGY) 10 MG tablet Take 10 mg by mouth daily      TRELEGY ELLIPTA 200-62.5-25 MCG/INH AEPB Take 1 Inhaler by mouth daily      acetaminophen (ACETAMINOPHEN EXTRA STRENGTH) 500 MG tablet Take 1 tablet by mouth every 8 hours as needed for Pain  Qty: 120 tablet, Refills: 1    Comments: DX Code Needed  . Associated Diagnoses: Status post total knee replacement, left      amLODIPine (NORVASC) 10 MG tablet Take 1 tablet by mouth daily  Qty: 90 tablet, Refills: 1    Associated Diagnoses: Essential hypertension      escitalopram (LEXAPRO) 10 MG tablet Take 1 tablet by mouth daily  Qty: 90 tablet, Refills: 1    Associated Diagnoses: Anxiety with depression      fluticasone (FLONASE) 50 MCG/ACT nasal spray 2 sprays by Nasal route daily  Qty: 3 Bottle, Refills: 1    Associated Diagnoses: Seasonal allergies      lovastatin (MEVACOR) 10 MG tablet Take 1 tablet by mouth nightly  Qty: 90 tablet, Refills: 1    Associated Diagnoses: Hyperlipidemia, unspecified hyperlipidemia type      montelukast (SINGULAIR) 10 MG tablet Take 1 tablet by mouth nightly  Qty: 90 tablet, Refills: 1    Comments: PT REQUEST REFILLS  Associated Diagnoses: Seasonal allergies      pantoprazole (PROTONIX) 40 MG tablet Take 1 tablet by mouth daily  Qty: 90 tablet, Refills: 1    Associated Diagnoses: Gastroesophageal reflux disease, unspecified whether esophagitis present      polyethylene glycol (GLYCOLAX) 17 GM/SCOOP powder Take 17 g by mouth daily  Qty: 510 g, Refills: 1    Associated Diagnoses: Constipation, unspecified constipation type      traZODone (DESYREL) 50 MG tablet Take 1 tablet by mouth nightly  Qty: 90 tablet, Refills: 1    Associated Diagnoses: Insomnia, unspecified type;  Anxiety with depression      Lancets MISC 1 each by Does not apply route 3 times daily  Qty: 200 each, Refills: 5      blood glucose monitor strips Test three home management;Performance in leisure activities;Limitations in community activities  -     Impairments Range of motion;Endurance;Dexterity;Impaired flexibility;Impaired muscle length;Impaired muscle power;Joint mobility;Joint integrity;Muscle strength  -     Assessment Comments Patient did well with al lther ex and given written copy of HEp exercises.  -AJ     Rehab Potential Good  -AJ     Patient/caregiver participated in establishment of treatment plan and goals Yes  -AJ     Patient would benefit from skilled therapy intervention Yes  -AJ        PT Plan    PT Frequency 2x/week  -AJ     Predicted Duration of Therapy Intervention (Therapy Eval) 3-5 weeks, 6-10 visits  -AJ     Planned CPT's? PT EVAL MOD COMPLELITY: 34466;PT RE-EVAL: 77199;PT THER PROC EA 15 MIN: 20477;PT THER ACT EA 15 MIN: 39582;PT MANUAL THERAPY EA 15 MIN: 02540;PT PARAFFIN BATH: 58327;PT THER SUPP EA 15 MIN  -AJ     Physical Therapy Interventions (Optional Details) fine motor skills;gross motor skills;home exercise program;joint mobilization;manual therapy techniques;modalities;patient/family education;postural re-education;ROM (Range of Motion);strengthening;stretching  -     PT Plan Comments Add tinger extensions on table top next session.  -       User Key  (r) = Recorded By, (t) = Taken By, (c) = Cosigned By    Initials Name Provider Type    PIEDAD Good, PT Physical Therapist       Other therapeutic activities and/or exercises will be prescribed depending on the patients progress or lack there of.            Exercises     Row Name 08/01/18 0800             Subjective Comments    Subjective Comments Patient wishes to get it stronger and get full motion back.  -AJ         Subjective Pain    Able to rate subjective pain? yes  -AJ      Pre-Treatment Pain Level 0  -AJ      Post-Treatment Pain Level 0  -AJ         Exercise 1    Exercise Name 1 Pro II UE F/R  -AJ      Time 1 10 minutes  -AJ      Additional Comments L 3.0- DD  times a day & as needed for symptoms of irregular blood glucose. Dispense sufficient amount for indicated testing frequency plus additional to accommodate PRN testing needs. Qty: 100 strip, Refills: 1    Comments: Brand per patient preference. May round up to next available package size. ipratropium-albuterol (DUONEB) 0.5-2.5 (3) MG/3ML SOLN nebulizer solution Inhale 3 mLs into the lungs every 6 hours as needed for Shortness of Breath  Qty: 360 mL, Refills: 3    Associated Diagnoses: Chronic obstructive pulmonary disease, unspecified COPD type ([facility])      Hydrocortisone, Perianal, 1 % cream daily as needed       Handicap Placard MISC by Does not apply route DX: OSTEOARTHRITIS OF BOTH KNEES (M17.0), COPD (J44.9)     EXPIRES: 2024  Qty: 1 each, Refills: 0    Associated Diagnoses: Primary osteoarthritis of both knees; Chronic obstructive pulmonary disease, unspecified COPD type ([facility]       ! ! - Potential duplicate medications found. Please discuss with provider.           ALLERGIES     Fish-derived products, Iodine, Seasonal, and Pcn [penicillins]    FAMILY HISTORY       Family History   Problem Relation Age of Onset    Arthritis Mother     Asthma Mother     High Cholesterol Mother     Other Mother         aneurysm    Diabetes Father     Stroke Maternal Grandmother     Cancer Maternal Grandfather     Hypertension Other     COPD Neg Hx           SOCIAL HISTORY       Social History     Socioeconomic History    Marital status: Single     Spouse name: n/a    Number of children: 1    Years of education: 15    Highest education level: None   Occupational History    Occupation: Disability     Employer: NONE   Tobacco Use    Smoking status: Current Some Day Smoker     Packs/day: 0.25     Years: 30.00     Pack years: 7.50     Types: Cigarettes, Cigars     Start date: 1988     Last attempt to quit: 10/1/2013     Years since quittin.0    Smokeless tobacco: Never Used   Vaping Use    Vaping Use: Never used   Substance and Sexual Activity    Alcohol use: Yes     Alcohol/week: 4.0 standard drinks     Types: 2 Cans of beer, 2 Shots of liquor per week     Comment: social 1 -2 x week    Drug use: Yes     Types: Cocaine, Marijuana     Comment: no cocaine x 1 year, marijuana weekly    Sexual activity: Not Currently     Partners: Female   Other Topics Concern    None   Social History Narrative    Lives in an apartment    15 steps to get up to the apartment    Asking for hospital bed and shower chair      Social Determinants of Health     Financial Resource Strain:     Difficulty of Paying Living Expenses:    Food Insecurity:     Worried About Running Out of Food in the Last Year:     [address] in the Last Year:    Transportation Needs:     Lack of Transportation (Medical):  Lack of Transportation (Non-Medical):    Physical Activity:     Days of Exercise per Week:     Minutes of Exercise per Session:    Stress:     Feeling of Stress :    Social Connections:     Frequency of Communication with Friends and Family:     Frequency of Social Gatherings with Friends and Family:     Attends Orthodox Services:     Active Member of Clubs or Organizations:     Attends Club or Organization Meetings:     Marital Status:    Intimate Partner Violence:     Fear of Current or Ex-Partner:     Emotionally Abused:     Physically Abused:     Sexually Abused:        SCREENINGS    Amari Coma Scale  Eye Opening: Spontaneous  Best Verbal Response: Oriented  Best Motor Response: Obeys commands  Amari Coma Scale Score: 15        PHYSICAL EXAM    (up to 7 for level 4, 8 or more for level 5)     ED Triage Vitals [10/31/21 2239]   BP Temp Temp Source Pulse Resp SpO2 Height Weight   124/67 -- Oral 109 20 91 % 5' 6\" (1.676 m) 285 lb (129.3 kg)       Physical Exam  Vitals and nursing note reviewed. Constitutional:       General: He is not in acute distress. Appearance: He is well-developed. He is obese.  He behind back  -AJ         Exercise 2    Exercise Name 2 R CT 1 S  -AJ      Reps 2 2  -AJ      Time 2 1 minute  -AJ      Additional Comments Holding onto fingers  -AJ         Exercise 3    Exercise Name 3 R CT 1 RF S  -AJ      Reps 3 2  -AJ      Time 3 1 minute  -AJ         Exercise 4    Exercise Name 4 Putty:   -AJ      Time 4 5 minutes  -AJ      Additional Comments Red  -AJ         Exercise 5    Exercise Name 5 Putty: Stars  -AJ      Time 5 3 minutes  -AJ      Additional Comments Yellow  -AJ         Exercise 6    Exercise Name 6 Finger spreads in/out  -AJ      Reps 6 20  -AJ        User Key  (r) = Recorded By, (t) = Taken By, (c) = Cosigned By    Initials Name Provider Type    PIEDAD Good, PT Physical Therapist                        Outcome Measure Options: Quick DASH  Quick DASH  Open a tight or new jar.: Mild Difficulty  Do heavy household chores (e.g., wash walls, wash floors): Mild Difficulty  Carry a shopping bag or briefcase: Moderate Difficulty  Wash your back: No Difficulty  Use a knife to cut food: Mild Difficulty  Recreational activities in which you take some force or impact through your arm, should or hand (e.g. golf, hammering, tennis, etc.): Moderate Difficulty  During the past week, to what extent has your arm, shoulder, or hand problem interfered with your normal social activites with family, friends, neighbors or groups?: Moderately  During the past week, were you limited in your work or other regular daily activities as a result of your arm, shoulder or hand problem?: Moderately Limited  Tingling (pins and needles) in your arm, shoulder, or hand: None  During the past week, how much difficulty have you had sleeping because of the pain in your arm, shoulder or hand?: No difficulty  Number of Questions Answered: 10  Quick DASH Score: 27.5         Time Calculation:   Start Time: 0852  Stop Time: 0938  Time Calculation (min): 46 min  Total Timed Code Minutes- PT: 23 minute(s)      Therapy Charges for Today     Code Description Service Date Service Provider Modifiers Qty    42750024168 HC PT CARRY MOV HAND OBJ CURRENT 8/1/2018 Ernestina Good, PT GP, CJ 1    54170224996 HC PT CARRY MOV HAND OBJ PROJECTED 8/1/2018 Ernestina Good, PT GP, CI 1    21664891404 HC PT EVAL MOD COMPLEXITY 2 8/1/2018 Ernestina Good, PT GP 1    38157659353 HC PT THER PROC EA 15 MIN 8/1/2018 Ernestina Good PT GP 2    51133257229 HC PT THER SUPP EA 15 MIN 8/1/2018 Ernestina Good, PT GP 1          PT G-Codes  Outcome Measure Options: Quick DASH  Functional Limitation: Carrying, moving and handling objects  Carrying, Moving and Handling Objects Current Status (): At least 20 percent but less than 40 percent impaired, limited or restricted  Carrying, Moving and Handling Objects Goal Status (): At least 1 percent but less than 20 percent impaired, limited or restricted         Ernestina Good, PT, DPT, CSCS  8/1/2018       is not ill-appearing, toxic-appearing or diaphoretic. Interventions: He is not intubated. HENT:      Head: Normocephalic and atraumatic. Nose: Nose normal.      Mouth/Throat:      Mouth: Mucous membranes are moist.      Pharynx: Oropharynx is clear. No pharyngeal swelling or oropharyngeal exudate. Eyes:      General: No scleral icterus. Right eye: No discharge. Left eye: No discharge. Conjunctiva/sclera: Conjunctivae normal.      Pupils: Pupils are equal, round, and reactive to light. Neck:      Thyroid: No thyromegaly. Vascular: No hepatojugular reflux or JVD. Trachea: No tracheal deviation. Cardiovascular:      Rate and Rhythm: Normal rate and regular rhythm. No extrasystoles are present. Pulses: No decreased pulses. Heart sounds: Normal heart sounds. No murmur heard. No friction rub. No gallop. Pulmonary:      Effort: Pulmonary effort is normal. No tachypnea, bradypnea, accessory muscle usage or respiratory distress. He is not intubated. Breath sounds: No stridor. Wheezing and rhonchi present. No decreased breath sounds or rales. Chest:      Chest wall: No mass, deformity, tenderness, crepitus or edema. There is no dullness to percussion. Abdominal:      General: Bowel sounds are normal. There is no distension. Palpations: Abdomen is soft. There is no hepatomegaly, splenomegaly or mass. Tenderness: There is no abdominal tenderness. There is no guarding or rebound. Musculoskeletal:         General: No tenderness or deformity. Normal range of motion. Cervical back: Normal range of motion and neck supple. Right lower leg: No tenderness. No edema. Left lower leg: No tenderness. No edema. Lymphadenopathy:      Cervical: No cervical adenopathy. Skin:     General: Skin is warm and dry. Capillary Refill: Capillary refill takes less than 2 seconds. Coloration: Skin is not cyanotic or pale.       Findings: No ecchymosis, erythema or rash. Nails: There is no clubbing. Neurological:      General: No focal deficit present. Mental Status: He is alert and oriented to person, place, and time. Cranial Nerves: No cranial nerve deficit. Motor: No weakness or abnormal muscle tone. Coordination: Coordination normal.      Deep Tendon Reflexes: Reflexes are normal and symmetric. Reflexes normal.   Psychiatric:         Mood and Affect: Mood is not anxious. Behavior: Behavior normal. Behavior is not agitated. Thought Content: Thought content normal.         Judgment: Judgment normal.         DIAGNOSTIC RESULTS     EKG: All EKG's are interpreted by the Emergency Department Physician who either signs or Co-signs this chart in the absence of a cardiologist.    Twelve-lead EKG shows sinus tachycardia, rate 107 bpm, normal intervals, normal electrical axis, no acute ST-T wave changes. RADIOLOGY:   Non-plain film images such as CT, Ultrasound and MRI are read by the radiologist. Sherill Push radiographicimages are visualized and preliminarily interpreted by the emergency physician with the below findings:    Chest x-ray shows improving infiltrate/consolidation of left lower lobe when compared with x-ray of 10/16/2021. .    Interpretation per the Radiologist below, if available at the time of this note:    XR CHEST PORTABLE    (Results Pending)         ED BEDSIDE ULTRASOUND:   Performed by ED Physician - none    LABS:  Labs Reviewed   COMPREHENSIVE METABOLIC PANEL - Abnormal; Notable for the following components:       Result Value    Glucose 139 (*)     Albumin 3.3 (*)     Total Bilirubin 1.0 (*)     All other components within normal limits   MAGNESIUM - Abnormal; Notable for the following components:    Magnesium 1.5 (*)     All other components within normal limits   CBC WITH AUTO DIFFERENTIAL - Abnormal; Notable for the following components:    WBC 14.5 (*)     RBC 3.51 (*)     Hemoglobin 10.6 (*) MEDICATIONS:  Current Discharge Medication List      START taking these medications    Details   predniSONE (DELTASONE) 10 MG tablet Take 6 tablets by mouth daily for 5 doses  Qty: 30 tablet, Refills: 0      !! levoFLOXacin (LEVAQUIN) 750 MG tablet Take 1 tablet by mouth daily for 7 days  Qty: 7 tablet, Refills: 0      !! guaiFENesin (MUCINEX) 600 MG extended release tablet Take 2 tablets by mouth 2 times daily for 10 days  Qty: 40 tablet, Refills: 0      benzonatate (TESSALON) 100 MG capsule Take 1 capsule by mouth 2 times daily as needed for Cough  Qty: 20 capsule, Refills: 0       !! - Potential duplicate medications found. Please discuss with provider.              (Please note that portions of this note were completed with a voice recognitionprogram.  Efforts were made to edit the dictations but occasionally words are mis-transcribed.)    Clau Foster MD (electronically signed)  Attending Emergency Physician          Clau Foster MD  11/01/21 [id number]

## 2021-12-07 DIAGNOSIS — J06.9 ACUTE URI: ICD-10-CM

## 2021-12-07 NOTE — TELEPHONE ENCOUNTER
Patient came in and is needing her Allegra-D sent to [facility] on the bypass. She no longer wants to use the [employer].

## 2021-12-08 RX ORDER — FEXOFENADINE HCL AND PSEUDOEPHEDRINE HCI 180; 240 MG/1; MG/1
1 TABLET, EXTENDED RELEASE ORAL DAILY
Qty: 90 TABLET | Refills: 1 | Status: SHIPPED | OUTPATIENT
Start: 2021-12-08 | End: 2022-12-20 | Stop reason: SDUPTHER

## 2021-12-16 ENCOUNTER — TELEPHONE (OUTPATIENT)
Dept: FAMILY MEDICINE CLINIC | Facility: CLINIC | Age: 66
End: 2021-12-16

## 2021-12-16 DIAGNOSIS — R92.8 ABNORMAL MAMMOGRAM OF LEFT BREAST: Primary | ICD-10-CM

## 2021-12-16 NOTE — TELEPHONE ENCOUNTER
Increased density in the retro areolar soft tissues on the let.  No discrete mass is identified.  The radiologist has recommended follow up spot compression views.  I have placed these orders.  Schedule ASAP and notify patient.

## 2021-12-17 DIAGNOSIS — Z12.31 ENCOUNTER FOR SCREENING MAMMOGRAM FOR BREAST CANCER: ICD-10-CM

## 2021-12-28 ENCOUNTER — TELEPHONE (OUTPATIENT)
Dept: FAMILY MEDICINE CLINIC | Facility: CLINIC | Age: 66
End: 2021-12-28

## 2021-12-30 DIAGNOSIS — R92.8 ABNORMAL MAMMOGRAM OF LEFT BREAST: ICD-10-CM

## 2022-01-03 DIAGNOSIS — R12 HEARTBURN: ICD-10-CM

## 2022-01-06 RX ORDER — DEXLANSOPRAZOLE 60 MG/1
CAPSULE, DELAYED RELEASE ORAL
Qty: 90 CAPSULE | Refills: 1 | Status: SHIPPED | OUTPATIENT
Start: 2022-01-06 | End: 2022-08-16

## 2022-01-07 ENCOUNTER — TELEPHONE (OUTPATIENT)
Dept: FAMILY MEDICINE CLINIC | Facility: CLINIC | Age: 67
End: 2022-01-07

## 2022-01-13 ENCOUNTER — TELEPHONE (OUTPATIENT)
Dept: FAMILY MEDICINE CLINIC | Facility: CLINIC | Age: 67
End: 2022-01-13

## 2022-01-13 DIAGNOSIS — M81.0 AGE-RELATED OSTEOPOROSIS WITHOUT CURRENT PATHOLOGICAL FRACTURE: Primary | ICD-10-CM

## 2022-01-13 NOTE — TELEPHONE ENCOUNTER
I spoke with Yamilex at [facility] about Mrs aSlgado Prolia injection. She stated that you will need to  Write the order on a rx script admit to outpatient infusion fo Prolia 60 mg subq and discharge after jprcedure.Put the Pt name,,and ICD 10 code on the Rx. Also needs an Office visit note to include mentioning the Prolia injection. Then I can fax it.

## 2022-01-20 NOTE — TELEPHONE ENCOUNTER
She will need to come to our lab to have her calcium, phosphorus, and magnesium drawn so I write her new order for her Prolia injection.  Also, I see that she is taking once weekly vitamin D, but she should also be taking 1000 mg of calcium every day.  Make sure she is also taking this before I write her injection order.

## 2022-01-21 ENCOUNTER — LAB (OUTPATIENT)
Dept: LAB | Facility: HOSPITAL | Age: 67
End: 2022-01-21

## 2022-01-21 PROCEDURE — 82310 ASSAY OF CALCIUM: CPT | Performed by: NURSE PRACTITIONER

## 2022-01-21 PROCEDURE — 84100 ASSAY OF PHOSPHORUS: CPT | Performed by: NURSE PRACTITIONER

## 2022-01-21 PROCEDURE — 83735 ASSAY OF MAGNESIUM: CPT | Performed by: NURSE PRACTITIONER

## 2022-01-22 LAB
CALCIUM SPEC-SCNC: 8.9 MG/DL (ref 8.6–10.5)
MAGNESIUM SERPL-MCNC: 1.9 MG/DL (ref 1.6–2.4)
PHOSPHATE SERPL-MCNC: 3.9 MG/DL (ref 2.5–4.5)

## 2022-02-11 ENCOUNTER — TELEPHONE (OUTPATIENT)
Dept: FAMILY MEDICINE CLINIC | Facility: CLINIC | Age: 67
End: 2022-02-11

## 2022-02-11 NOTE — TELEPHONE ENCOUNTER
----- Message from TEDDY Zelaya sent at 2/11/2022 12:35 PM CST -----  Normal magnesium, calcium, and phosphorus levels.  I have written an RX to be sent to [facility] infusion for her Prolia injection.  Also remind her that she should be taking 1000 mg of calcium daily, as well as 400 IU of vitamin D3 daily.

## 2022-02-14 ENCOUNTER — TELEPHONE (OUTPATIENT)
Dept: FAMILY MEDICINE CLINIC | Facility: CLINIC | Age: 67
End: 2022-02-14

## 2022-02-14 NOTE — TELEPHONE ENCOUNTER
Will you add documentation to her last office note about her needing the Prolia injection so I can fax the order please.It is required that they have an office note with the order.

## 2022-02-24 ENCOUNTER — TELEPHONE (OUTPATIENT)
Dept: FAMILY MEDICINE CLINIC | Facility: CLINIC | Age: 67
End: 2022-02-24

## 2022-02-24 NOTE — TELEPHONE ENCOUNTER
Her last office visit was in September.  I don't believe I can addend a visit that is that old.  It has already been billed to insurance.

## 2022-02-24 NOTE — TELEPHONE ENCOUNTER
I still need her office note that mentions she needs the Prolia injection so I can fax her Rx for it. Can you add this to  Her last office note.

## 2022-03-07 ENCOUNTER — TELEPHONE (OUTPATIENT)
Dept: FAMILY MEDICINE CLINIC | Facility: CLINIC | Age: 67
End: 2022-03-07

## 2022-03-07 NOTE — TELEPHONE ENCOUNTER
"[facility] called and are needing the order for patient to include \"Admit to out patient fusion, discharge home after treatment\" as well as a copy of patient's history and physical.  " Patient declined x-rays  No X-rays were taken

## 2022-03-09 ENCOUNTER — TELEPHONE (OUTPATIENT)
Dept: FAMILY MEDICINE CLINIC | Facility: CLINIC | Age: 67
End: 2022-03-09

## 2022-03-09 NOTE — TELEPHONE ENCOUNTER
"[facility] called and are needing the order for patient to include \"Admit to out patient fusion, discharge home after treatment\" as well as a copy of patient's history and physical.  "

## 2022-03-22 ENCOUNTER — OFFICE VISIT (OUTPATIENT)
Dept: FAMILY MEDICINE CLINIC | Facility: CLINIC | Age: 67
End: 2022-03-22

## 2022-03-22 VITALS
HEART RATE: 67 BPM | DIASTOLIC BLOOD PRESSURE: 80 MMHG | WEIGHT: 160.8 LBS | RESPIRATION RATE: 20 BRPM | BODY MASS INDEX: 24.37 KG/M2 | TEMPERATURE: 97.1 F | HEIGHT: 68 IN | SYSTOLIC BLOOD PRESSURE: 130 MMHG | OXYGEN SATURATION: 96 %

## 2022-03-22 DIAGNOSIS — Z13.1 SCREENING FOR DIABETES MELLITUS: ICD-10-CM

## 2022-03-22 DIAGNOSIS — Z13.29 SCREENING FOR THYROID DISORDER: ICD-10-CM

## 2022-03-22 DIAGNOSIS — R79.9 ABNORMAL FINDING OF BLOOD CHEMISTRY, UNSPECIFIED: ICD-10-CM

## 2022-03-22 DIAGNOSIS — E55.9 VITAMIN D DEFICIENCY DISEASE: ICD-10-CM

## 2022-03-22 DIAGNOSIS — Z13.220 SCREENING FOR LIPOID DISORDERS: ICD-10-CM

## 2022-03-22 DIAGNOSIS — M32.8 OTHER FORMS OF SYSTEMIC LUPUS ERYTHEMATOSUS, UNSPECIFIED ORGAN INVOLVEMENT STATUS: Primary | ICD-10-CM

## 2022-03-22 DIAGNOSIS — R12 HEARTBURN: ICD-10-CM

## 2022-03-22 DIAGNOSIS — J30.2 SEASONAL ALLERGIES: ICD-10-CM

## 2022-03-22 PROCEDURE — 99214 OFFICE O/P EST MOD 30 MIN: CPT | Performed by: NURSE PRACTITIONER

## 2022-03-22 RX ORDER — PHENOL 1.4 %
AEROSOL, SPRAY (ML) MUCOUS MEMBRANE EVERY 6 HOURS SCHEDULED
COMMUNITY

## 2022-04-12 RX ORDER — MECLIZINE HYDROCHLORIDE 25 MG/1
TABLET ORAL
Qty: 270 TABLET | Refills: 1 | Status: SHIPPED | OUTPATIENT
Start: 2022-04-12

## 2022-04-19 NOTE — PROGRESS NOTES
Subjective   Genesis Salgado is a 67 y.o. female.     She presents today for her routine follow-up on chronic medical problems.  She reports that her allergy symptoms are currently fairly well controlled.  Her motion sickness is also well controlled.  She continues to follow-up with rheumatology regarding her SLE.  She is tolerating her calcium and vitamin D supplements without side effect.  She reports that her heartburn is well controlled with Dexilant.  She is due for routine fasting labs.  She also needs a new order for her Prolia injections that she receives every 6 months at [facility] outpatient infusion.  She has had the recommended lab work performed and needs a new order.    Heartburn  She complains of belching. She reports no abdominal pain, no chest pain, no choking, no coughing, no dysphagia, no early satiety, no globus sensation, no hoarse voice, no nausea, no sore throat, no stridor, no tooth decay, no water brash or no wheezing. This is a recurrent problem. The current episode started more than 1 month ago. The problem occurs frequently. The problem has been waxing and waning. Pertinent negatives include no anemia, melena, muscle weakness, orthopnea or weight loss. Risk factors include hiatal hernia. She has tried a PPI for the symptoms. The treatment provided moderate relief.        The following portions of the patient's history were reviewed and updated as appropriate: allergies, current medications, past family history, past medical history, past social history, past surgical history and problem list.    Review of Systems   Constitutional: Negative.  Negative for unexpected weight loss.   HENT: Negative.  Negative for hoarse voice and sore throat.    Eyes: Negative.    Respiratory: Negative.  Negative for cough, choking and wheezing.    Cardiovascular: Negative.  Negative for chest pain.   Gastrointestinal: Negative.  Negative for abdominal pain, dysphagia, melena and nausea.    Musculoskeletal: Positive for arthralgias. Negative for muscle weakness.   Skin: Negative.    Allergic/Immunologic: Negative.    Neurological: Negative.    Hematological: Negative.    Psychiatric/Behavioral: Negative.        Objective   Physical Exam  Vitals reviewed.   Constitutional:       General: She is not in acute distress.     Appearance: She is well-developed. She is not diaphoretic.   HENT:      Head: Normocephalic.      Right Ear: External ear normal.      Left Ear: External ear normal.      Nose: Nose normal.   Eyes:      Pupils: Pupils are equal, round, and reactive to light.   Neck:      Thyroid: No thyromegaly.      Vascular: No JVD.   Cardiovascular:      Rate and Rhythm: Normal rate and regular rhythm.      Heart sounds: No murmur heard.    No friction rub. No gallop.   Pulmonary:      Effort: Pulmonary effort is normal. No respiratory distress.      Breath sounds: Normal breath sounds. No wheezing or rales.   Musculoskeletal:         General: Normal range of motion.      Cervical back: Normal range of motion and neck supple.   Skin:     General: Skin is warm and dry.      Coloration: Skin is not pale.      Findings: No erythema or rash.   Neurological:      Mental Status: She is alert and oriented to person, place, and time.   Psychiatric:         Behavior: Behavior normal.         Thought Content: Thought content normal.         Judgment: Judgment normal.           Assessment/Plan   Diagnoses and all orders for this visit:    1. Other forms of systemic lupus erythematosus, unspecified organ involvement status (HCC) (Primary)  -     CBC & Differential  -     Comprehensive Metabolic Panel  -     Hemoglobin A1c  -     Lipid Panel  -     TSH  -     Vitamin D 25 Hydroxy    2. Vitamin D deficiency disease  -     CBC & Differential  -     Comprehensive Metabolic Panel  -     Hemoglobin A1c  -     Lipid Panel  -     TSH  -     Vitamin D 25 Hydroxy    3. Seasonal allergies  -     CBC & Differential  -      Comprehensive Metabolic Panel  -     Hemoglobin A1c  -     Lipid Panel  -     TSH  -     Vitamin D 25 Hydroxy    4. Screening for diabetes mellitus  -     CBC & Differential  -     Comprehensive Metabolic Panel  -     Hemoglobin A1c  -     Lipid Panel  -     TSH  -     Vitamin D 25 Hydroxy    5. Screening for lipoid disorders  -     CBC & Differential  -     Comprehensive Metabolic Panel  -     Hemoglobin A1c  -     Lipid Panel  -     TSH  -     Vitamin D 25 Hydroxy    6. Screening for thyroid disorder  -     CBC & Differential  -     Comprehensive Metabolic Panel  -     Hemoglobin A1c  -     Lipid Panel  -     TSH  -     Vitamin D 25 Hydroxy    7. Abnormal finding of blood chemistry, unspecified   -     Hemoglobin A1c  -     Lipid Panel    8. Heartburn      1.  SLE-continue to follow-up with rheumatology as scheduled.  2.  Vitamin D deficiency-continue vitamin D 5000 IU once weekly.  3.  Seasonal allergies- continue Allegra-D for seasonal allergy management.  4.  Osteoporosis-continue Prolia injections every 6 months for osteoporosis management.  5.  Heartburn-continue Dexilant 60 mg daily for heartburn management.           Patient's Body mass index is 24.45 kg/m². indicating that she is within normal range (BMI 18.5-24.9). No BMI management plan needed..    Fasting labs.  Continue Prolia injections every 6 months at [facility] infusion for osteoporosis management.  Continue to follow-up with specialists as scheduled.  Continue current medications.  Follow up as scheduled for routine follow up.  Follow up sooner for problems/concerns.  Patient verbalized understanding and agreement with plan of care.        This document has been electronically signed by TEDDY Zelaya on April 19, 2022 10:50 CDT

## 2022-04-20 DIAGNOSIS — E55.9 VITAMIN D DEFICIENCY DISEASE: ICD-10-CM

## 2022-04-20 RX ORDER — ERGOCALCIFEROL 1.25 MG/1
CAPSULE ORAL
Qty: 12 CAPSULE | Refills: 1 | Status: SHIPPED | OUTPATIENT
Start: 2022-04-20 | End: 2022-10-05

## 2022-04-21 ENCOUNTER — TELEPHONE (OUTPATIENT)
Dept: FAMILY MEDICINE CLINIC | Facility: CLINIC | Age: 67
End: 2022-04-21

## 2022-04-21 DIAGNOSIS — M81.0 AGE-RELATED OSTEOPOROSIS WITHOUT CURRENT PATHOLOGICAL FRACTURE: Primary | Chronic | ICD-10-CM

## 2022-04-21 NOTE — TELEPHONE ENCOUNTER
[facility] called stating they need an order for Prolia, they received the patients HPI but did not get an order.       [phone]    Thanks

## 2022-04-25 ENCOUNTER — TELEPHONE (OUTPATIENT)
Dept: FAMILY MEDICINE CLINIC | Facility: CLINIC | Age: 67
End: 2022-04-25

## 2022-04-25 DIAGNOSIS — M81.0 AGE-RELATED OSTEOPOROSIS WITHOUT CURRENT PATHOLOGICAL FRACTURE: Primary | ICD-10-CM

## 2022-04-25 NOTE — TELEPHONE ENCOUNTER
Erika from [facility] called they are needing the order for the prolia to say admit to out patient infusion. Discharge home after treatment.

## 2022-04-26 ENCOUNTER — TELEPHONE (OUTPATIENT)
Dept: FAMILY MEDICINE CLINIC | Facility: CLINIC | Age: 67
End: 2022-04-26

## 2022-04-26 NOTE — TELEPHONE ENCOUNTER
"[facility] called stating she has called 4x in regards to updating an order. She is asking if TEDDY York would update the order to include \"admit to outpatient infusion\" and \"discharge home after treatment\" or if the order needs to be tossed.  Call back number: [phone]  Fax number: [phone]  "

## 2022-04-26 NOTE — TELEPHONE ENCOUNTER
I spoke with Erika to inform her of what you said about the order for the infusion clinic. Erika stated that she was getting rid of the order.

## 2022-04-27 ENCOUNTER — LAB (OUTPATIENT)
Dept: LAB | Facility: HOSPITAL | Age: 67
End: 2022-04-27

## 2022-04-27 LAB
25(OH)D3 SERPL-MCNC: 40.6 NG/ML (ref 30–100)
ALBUMIN SERPL-MCNC: 3.9 G/DL (ref 3.5–5.2)
ALBUMIN/GLOB SERPL: 1.4 G/DL
ALP SERPL-CCNC: 123 U/L (ref 39–117)
ALT SERPL W P-5'-P-CCNC: 9 U/L (ref 1–33)
ANION GAP SERPL CALCULATED.3IONS-SCNC: 7.4 MMOL/L (ref 5–15)
AST SERPL-CCNC: 14 U/L (ref 1–32)
BASOPHILS # BLD AUTO: 0.04 10*3/MM3 (ref 0–0.2)
BASOPHILS NFR BLD AUTO: 0.9 % (ref 0–1.5)
BILIRUB SERPL-MCNC: 0.4 MG/DL (ref 0–1.2)
BUN SERPL-MCNC: 11 MG/DL (ref 8–23)
BUN/CREAT SERPL: 12.5 (ref 7–25)
CALCIUM SPEC-SCNC: 9.2 MG/DL (ref 8.6–10.5)
CHLORIDE SERPL-SCNC: 104 MMOL/L (ref 98–107)
CHOLEST SERPL-MCNC: 179 MG/DL (ref 0–200)
CO2 SERPL-SCNC: 28.6 MMOL/L (ref 22–29)
CREAT SERPL-MCNC: 0.88 MG/DL (ref 0.57–1)
DEPRECATED RDW RBC AUTO: 38.7 FL (ref 37–54)
EGFRCR SERPLBLD CKD-EPI 2021: 71.2 ML/MIN/1.73
EOSINOPHIL # BLD AUTO: 0.12 10*3/MM3 (ref 0–0.4)
EOSINOPHIL NFR BLD AUTO: 2.6 % (ref 0.3–6.2)
ERYTHROCYTE [DISTWIDTH] IN BLOOD BY AUTOMATED COUNT: 12.5 % (ref 12.3–15.4)
GLOBULIN UR ELPH-MCNC: 2.7 GM/DL
GLUCOSE SERPL-MCNC: 88 MG/DL (ref 65–99)
HBA1C MFR BLD: 5.5 % (ref 4.8–5.6)
HCT VFR BLD AUTO: 39.2 % (ref 34–46.6)
HDLC SERPL-MCNC: 56 MG/DL (ref 40–60)
HGB BLD-MCNC: 13.7 G/DL (ref 12–15.9)
IMM GRANULOCYTES # BLD AUTO: 0.01 10*3/MM3 (ref 0–0.05)
IMM GRANULOCYTES NFR BLD AUTO: 0.2 % (ref 0–0.5)
LDLC SERPL CALC-MCNC: 102 MG/DL (ref 0–100)
LDLC/HDLC SERPL: 1.79 {RATIO}
LYMPHOCYTES # BLD AUTO: 1.42 10*3/MM3 (ref 0.7–3.1)
LYMPHOCYTES NFR BLD AUTO: 31 % (ref 19.6–45.3)
MCH RBC QN AUTO: 30.2 PG (ref 26.6–33)
MCHC RBC AUTO-ENTMCNC: 34.9 G/DL (ref 31.5–35.7)
MCV RBC AUTO: 86.3 FL (ref 79–97)
MONOCYTES # BLD AUTO: 0.32 10*3/MM3 (ref 0.1–0.9)
MONOCYTES NFR BLD AUTO: 7 % (ref 5–12)
NEUTROPHILS NFR BLD AUTO: 2.67 10*3/MM3 (ref 1.7–7)
NEUTROPHILS NFR BLD AUTO: 58.3 % (ref 42.7–76)
NRBC BLD AUTO-RTO: 0 /100 WBC (ref 0–0.2)
PLATELET # BLD AUTO: 260 10*3/MM3 (ref 140–450)
PMV BLD AUTO: 11.2 FL (ref 6–12)
POTASSIUM SERPL-SCNC: 4.6 MMOL/L (ref 3.5–5.2)
PROT SERPL-MCNC: 6.6 G/DL (ref 6–8.5)
RBC # BLD AUTO: 4.54 10*6/MM3 (ref 3.77–5.28)
SODIUM SERPL-SCNC: 140 MMOL/L (ref 136–145)
TRIGL SERPL-MCNC: 115 MG/DL (ref 0–150)
TSH SERPL DL<=0.05 MIU/L-ACNC: 2.04 UIU/ML (ref 0.27–4.2)
VLDLC SERPL-MCNC: 21 MG/DL (ref 5–40)
WBC NRBC COR # BLD: 4.58 10*3/MM3 (ref 3.4–10.8)

## 2022-04-27 PROCEDURE — 85025 COMPLETE CBC W/AUTO DIFF WBC: CPT | Performed by: NURSE PRACTITIONER

## 2022-04-27 PROCEDURE — 84443 ASSAY THYROID STIM HORMONE: CPT | Performed by: NURSE PRACTITIONER

## 2022-04-27 PROCEDURE — 80053 COMPREHEN METABOLIC PANEL: CPT | Performed by: NURSE PRACTITIONER

## 2022-04-27 PROCEDURE — 83036 HEMOGLOBIN GLYCOSYLATED A1C: CPT | Performed by: NURSE PRACTITIONER

## 2022-04-27 PROCEDURE — 80061 LIPID PANEL: CPT | Performed by: NURSE PRACTITIONER

## 2022-04-27 PROCEDURE — 82306 VITAMIN D 25 HYDROXY: CPT | Performed by: NURSE PRACTITIONER

## 2022-04-29 DIAGNOSIS — M81.0 AGE-RELATED OSTEOPOROSIS WITHOUT CURRENT PATHOLOGICAL FRACTURE: Primary | ICD-10-CM

## 2022-05-03 ENCOUNTER — TELEPHONE (OUTPATIENT)
Dept: FAMILY MEDICINE CLINIC | Facility: CLINIC | Age: 67
End: 2022-05-03

## 2022-05-03 DIAGNOSIS — R74.8 ELEVATED ALKALINE PHOSPHATASE LEVEL: Primary | ICD-10-CM

## 2022-05-03 NOTE — TELEPHONE ENCOUNTER
----- Message from TDEDY Zelaya sent at 4/28/2022  8:23 PM CDT -----  Normal A1C.  Normal thyroid.  Normal vitamin D.  Normal electrolytes and kidney function.  Alkaline phosphatase is slightly elevated.  I would recommend a liver ultrasound to be safe.  LDL cholesterol is still slightly elevated.  I would suggest a low dose cholesterol medication to decrease her risk of heart attack and stroke if she is open to this?  Normal blood counts.       Pt stated that she is okay with the Liver US. Pt wants to see if she can get her LDL cholesterol level down first before starting on medication.

## 2022-05-20 NOTE — TELEPHONE ENCOUNTER
She stated that she needed a week supply called into the [facility] in [place] she is out of medications.   rolling walker

## 2022-05-25 ENCOUNTER — TELEPHONE (OUTPATIENT)
Dept: FAMILY MEDICINE CLINIC | Facility: CLINIC | Age: 67
End: 2022-05-25

## 2022-05-25 NOTE — TELEPHONE ENCOUNTER
Patient called stated she had a missed call. She said you can leave her a voicemail she is working in her yard.

## 2022-05-25 NOTE — TELEPHONE ENCOUNTER
Liver ultrasound shows coarsening of the hepatic echotexture, consistent with fatty infiltration.  Surgically absent gallbladder.  Nonvisualization of the pancreas.  I would recommend a referral to GI for evaluation of fatty liver she is okay with this.

## 2022-06-01 ENCOUNTER — TELEPHONE (OUTPATIENT)
Dept: FAMILY MEDICINE CLINIC | Facility: CLINIC | Age: 67
End: 2022-06-01

## 2022-06-01 DIAGNOSIS — M81.0 AGE-RELATED OSTEOPOROSIS WITHOUT CURRENT PATHOLOGICAL FRACTURE: ICD-10-CM

## 2022-06-01 NOTE — TELEPHONE ENCOUNTER
----- Message from TEDDY Zelaya sent at 6/1/2022 11:03 AM CDT -----  DXA scan shows osteoporosis.  Can we see if the bone clinic will schedule her appt now that her DXA scan is UTD?    I spoke with [facility] she will be getting Mrs Peters scheduled. She saw her Bone Density result.

## 2022-06-08 DIAGNOSIS — R74.8 ELEVATED ALKALINE PHOSPHATASE LEVEL: ICD-10-CM

## 2022-06-10 ENCOUNTER — TELEPHONE (OUTPATIENT)
Dept: FAMILY MEDICINE CLINIC | Facility: CLINIC | Age: 67
End: 2022-06-10

## 2022-06-10 NOTE — TELEPHONE ENCOUNTER
Called pt, no answer-left vm for pt to return call in regards to results.    t returned call, pt notified of results, pt would like to see Dr. Leon, as she has seen him before when she had coloncancer.    Also asked for this to be scheduled after June 20thP

## 2022-06-10 NOTE — TELEPHONE ENCOUNTER
----- Message from TEDDY Zelaya sent at 6/8/2022 12:50 PM CDT -----  Coarsening of the hepatic echotexture, consistent with fatty infiltration.  Surgically absent gallbladder.  I would recommend avoiding high fructose corn syrup, greasy/fatty/fried foods, and excess sugars.    We can refer to GI if she would like.

## 2022-06-10 NOTE — PROGRESS NOTES
Dr. Leon is a general surgeon, not GI.  Unfortunately, he would be able to see her for this concern.  Dr. Rudolph is the GI with [facility], or we have Jose Angel Good and Gilma Camacho who are GI providers and come to our clinic from [place].

## 2022-06-15 ENCOUNTER — TELEPHONE (OUTPATIENT)
Dept: FAMILY MEDICINE CLINIC | Facility: CLINIC | Age: 67
End: 2022-06-15

## 2022-06-15 DIAGNOSIS — K76.0 FATTY LIVER: Primary | ICD-10-CM

## 2022-06-15 NOTE — TELEPHONE ENCOUNTER
Patient would like to be referred to Dr. Santana for gastro at [facility] [phone]

## 2022-06-27 ENCOUNTER — OFFICE VISIT (OUTPATIENT)
Dept: ORTHOPEDIC SURGERY | Facility: CLINIC | Age: 67
End: 2022-06-27

## 2022-06-27 VITALS — HEIGHT: 68 IN | BODY MASS INDEX: 24.25 KG/M2 | WEIGHT: 160 LBS

## 2022-06-27 DIAGNOSIS — Z78.0 POSTMENOPAUSAL: ICD-10-CM

## 2022-06-27 DIAGNOSIS — M32.8 OTHER FORMS OF SYSTEMIC LUPUS ERYTHEMATOSUS, UNSPECIFIED ORGAN INVOLVEMENT STATUS: ICD-10-CM

## 2022-06-27 DIAGNOSIS — Z87.81 HISTORY OF COMPRESSION FRACTURE OF VERTEBRAL COLUMN: ICD-10-CM

## 2022-06-27 DIAGNOSIS — M81.0 AGE-RELATED OSTEOPOROSIS WITHOUT CURRENT PATHOLOGICAL FRACTURE: Primary | ICD-10-CM

## 2022-06-27 DIAGNOSIS — Z86.39 HISTORY OF VITAMIN D DEFICIENCY: ICD-10-CM

## 2022-06-27 DIAGNOSIS — Z92.21 HISTORY OF CHEMOTHERAPY: ICD-10-CM

## 2022-06-27 PROCEDURE — 99214 OFFICE O/P EST MOD 30 MIN: CPT | Performed by: NURSE PRACTITIONER

## 2022-06-27 NOTE — PROGRESS NOTES
Genesis Salgado is a 67 y.o. female returns for     Chief Complaint   Patient presents with   • Osteoporosis     HISTORY OF PRESENT ILLNESS: 67-year-old  female patient [facility] health evaluation and continued treatment of osteoporosis.     Last Bone Density Study: 5/31/2022    Referred by: TEDDY York (primary care)    History of Osteoporosis or Osteopenia: History of osteoporosis, noted on DEXA scan in 2012 at the age of 57.  Prior Treatments for Osteoporosis: Patient previously took Forteo injections for 1 year.  She was then started on Prolia and believes that she had 2 injections and then was lost to follow-up due to the COVID-19 pandemic as they had held/canceled her scheduled injections.  It has been over 2 years since her last Prolia injection.  She did tolerate the Prolia well with no known adverse effects.  Patient wants to start back on the Prolia for continued treatment of her osteoporosis.    Decrease in Height: Unknown    Fracture History: Patient has a current right patella fracture that is being treated by another provider, which occurred due to a mechanical fall last week when she tripped over her garden hose.  Patient has a history of multiple compression fractures.  Notes reviewed indicate she sustained compression fractures of L3 and L4 in 2018 and underwent kyphoplasty at these levels in July 2018 at [place].  Patient reports additional compression fractures at T12 and L5.  She also has a history of bilateral ankle fractures per her report.    High Risk Medications:   Current: Dexilant   Previous: Dexilant, chemotherapy, prednisone intermittently; No long-term use of systemic steroids greater than 3 months reported.    Comorbid Medical Conditions that Increase Risk for Osteoporosis: Lupus, history of colon cancer requiring chemotherapy, irritable bowel syndrome, history of vitamin D deficiency, liver disease (recently diagnosed with fatty  "liver)    Postmenopausal status: Postmenopausal  Age of Menopause/Hysterectomy: Age 49  Hormone Replacement Therapy: No    Family History of Osteoporosis: Yes, history of osteoporosis in her mother.    History of Smoking: No, never smoker.    Taking Calcium supplements: No, not currently.  Taking Vitamin D supplements: Yes, taking 50,000 units of vitamin D weekly.    Activity Level: Patient is typically very active and exercises.  She also does dancing 3 times weekly.  However, she currently has an acute right patella fracture so her mobility and activity is limited.       CONCURRENT MEDICAL HISTORY:    The following portions of the patient's history were reviewed and updated as appropriate: allergies, current medications, past family history, past medical history, past social history, past surgical history and problem list.     ROS  No fevers or chills.  No chest pain or shortness of air.  No GI or  disturbances.  Right knee pain.    PHYSICAL EXAMINATION:       Ht 172.7 cm (68\")   Wt 72.6 kg (160 lb)   BMI 24.33 kg/m²     Physical Exam  Vitals reviewed.   Constitutional:       General: She is not in acute distress.     Appearance: She is well-developed. She is not ill-appearing.   HENT:      Head: Normocephalic.   Pulmonary:      Effort: Pulmonary effort is normal. No respiratory distress.   Abdominal:      General: There is no distension.      Palpations: Abdomen is soft.   Skin:     General: Skin is warm and dry.      Capillary Refill: Capillary refill takes less than 2 seconds.      Findings: No erythema.   Neurological:      Mental Status: She is alert and oriented to person, place, and time.      GCS: GCS eye subscore is 4. GCS verbal subscore is 5. GCS motor subscore is 6.   Psychiatric:         Speech: Speech normal.         Behavior: Behavior normal.         Thought Content: Thought content normal.         Judgment: Judgment normal.         GAIT:     []  Normal  [x]  Antalgic    Assistive device: []  " None  []  Walker     []  Crutches  [x]  Cane     []  Wheelchair  []  Stretcher    Back Exam     Other   Gait: antalgic     Comments:  No kyphosis or obvious deformity noted.  No focal neurological deficits noted.            DEXA performed on 5/31/2022 @ [facility]    LEFT FOREARM:  The average bone mineral density within the left forearm is 0.371 g/cm².  As compared to the standard young normals, the T score is -3.8.  As compared to the age and gender matched subjects, the Z score is -1.7.    LEFT HIP:  The average bone mineral density within the left hip is 0.540 g/cm².  As compared to the standard young normals, the T score is -2.8.  As compared to the age and gender matched subjects, the Z score is -1.0.    IMPRESSION:  The bone mineral density according to the WHO criteria is indicative of osteoporosis.        ASSESSMENT:    Diagnoses and all orders for this visit:    Age-related osteoporosis without current pathological fracture    Other forms of systemic lupus erythematosus, unspecified organ involvement status (HCC)    History of compression fracture of vertebral column    History of vitamin D deficiency    Postmenopausal    History of chemotherapy    PLAN    Bone density study results from 5/31/2022 are reviewed and discussed with patient today.  We discussed that she has a T score of -3.8 in the left forearm, indicative of akbar osteoporosis.  We discussed that she has a T score of -2.8 in the left hip, also indicative of akbar osteoporosis.  The patient was diagnosed with osteoporosis approximately 10 years ago by DEXA scan at the age of 57.  She has been treated intermittently over the course of several years and previously was treated with 1 year of Forteo injections.  She tolerated the Forteo well and it is unclear as to why she only had 1 year instead of the recommended 2 years.  Irregardless, she was then changed to Prolia injections and believes that she had 2 prior Prolia  injections and she did tolerate them well with no known adverse effects.  However, her scheduled Prolia injections were delayed/rescheduled/canceled due to the COVID-19 pandemic and then she was lost to follow-up.  She states it has been over 2 years since she has had any Prolia injections.  We discussed that she is high risk for fracture due to her osteoporosis and it is recommended that she restart some form of osteoporosis treatment.  She even currently has an acute right patella fracture that occurred last week due to a mechanical fall from a standing height when she tripped over her garden hose.  She also has a history of multiple compression fractures of her spine with also a history of vertebroplasty of L3 and L4 vertebrae in 2018.  We discussed that in my opinion Prolia is an effective osteoporosis treatment and I have recommended that she return to Prolia injections.  Patient is in agreement with this and wants to proceed.  She understands that she will receive a telephone call to schedule her injection at the [facility], pending insurance approval.  It is noted that she was previously getting her Prolia injections at the infusion center in [place], but we discussed that based on my experience with the infusion center in [place], they require an office visit prior to each injection, which is unnecessary and wasteful.  Therefore, if she has to drive to [place] for another necessary office visit, she may as well have them at the [facility] as we do not require an office visit before each Prolia injection.  Patient is in agreement with this and states she lives approximately 35 minutes from here and she is willing to drive to [place] for her Prolia injections.  We reviewed risk versus benefits of Prolia.  We reviewed potential adverse side effects of Prolia.    We discussed the importance of proper nutrition and adequate intake of calcium and vitamin D for optimal bone health and  prevention of worsening osteoporosis. Recommend to continue with her high-dose vitamin D supplementation of 50,000 units weekly as she does have a history of vitamin D deficiency.  She had a recent recheck of her vitamin D level on 4/27/2022 and it was normal at 40.6.  She is currently not taking calcium supplementation I have recommended that she start taking a calcium supplement once or twice daily, such as Citracal or Caltrate, which can be purchased over-the-counter.  We discussed that the calcium supplementation is beneficial for support of good bone health but is also needed while taking Prolia injections to prevent low blood calcium.  We also discussed the importance of regular exercise, especially weightbearing exercise such as walking, for optimal bone health and prevention of worsening osteoporosis.  Typically, the patient is very active and even dances 3 times per week for exercise/leisure.  However, she is currently being treated for an acute right patella fracture so her activity and weightbearing are limited at this time.  An educational packet regarding osteoporosis is provided to the patient today.  The patient is a non-smoker with no history of smoking so smoking cessation is not addressed or applicable.    Recommend a repeat bone density study in 1 year and follow-up with [facility].    Time spent of a minimum of 30 minutes including the face to face evaluation, reviewing of medical history and prior medial records, reviewing of diagnostic studies, prescription drug management, documentation, patient education and coordination of care.     EMR Dragon/Transciption Disclaimer: Some of this note may be an electronic transcription/translation of spoken language to printed text using the Dragon Dictation System.     Return in about 1 year (around 6/27/2023) for Recheck.        This document has been electronically signed by TEDDY Choudhary on June 27, 2022 12:10 CDT      Brandee Allison  APRN

## 2022-07-13 DIAGNOSIS — M81.0 OSTEOPOROSIS WITHOUT CURRENT PATHOLOGICAL FRACTURE, UNSPECIFIED OSTEOPOROSIS TYPE: Primary | ICD-10-CM

## 2022-08-04 ENCOUNTER — LAB (OUTPATIENT)
Dept: LAB | Facility: HOSPITAL | Age: 67
End: 2022-08-04

## 2022-08-04 PROCEDURE — 82310 ASSAY OF CALCIUM: CPT | Performed by: NURSE PRACTITIONER

## 2022-08-11 ENCOUNTER — INFUSION (OUTPATIENT)
Dept: ONCOLOGY | Facility: HOSPITAL | Age: 67
End: 2022-08-11

## 2022-08-11 ENCOUNTER — APPOINTMENT (OUTPATIENT)
Dept: ONCOLOGY | Facility: HOSPITAL | Age: 67
End: 2022-08-11

## 2022-08-11 VITALS
SYSTOLIC BLOOD PRESSURE: 149 MMHG | HEART RATE: 80 BPM | DIASTOLIC BLOOD PRESSURE: 89 MMHG | TEMPERATURE: 96.9 F | RESPIRATION RATE: 18 BRPM

## 2022-08-11 DIAGNOSIS — M81.0 OSTEOPOROSIS WITHOUT CURRENT PATHOLOGICAL FRACTURE, UNSPECIFIED OSTEOPOROSIS TYPE: Primary | ICD-10-CM

## 2022-08-11 PROCEDURE — 96372 THER/PROPH/DIAG INJ SC/IM: CPT | Performed by: NURSE PRACTITIONER

## 2022-08-11 PROCEDURE — 25010000002 DENOSUMAB 60 MG/ML SOLUTION PREFILLED SYRINGE: Performed by: NURSE PRACTITIONER

## 2022-08-11 RX ADMIN — DENOSUMAB 60 MG: 60 INJECTION SUBCUTANEOUS at 09:22

## 2022-08-16 DIAGNOSIS — R12 HEARTBURN: ICD-10-CM

## 2022-08-16 RX ORDER — DEXLANSOPRAZOLE 60 MG/1
CAPSULE, DELAYED RELEASE ORAL
Qty: 90 CAPSULE | Refills: 3 | Status: SHIPPED | OUTPATIENT
Start: 2022-08-16 | End: 2022-09-22 | Stop reason: ALTCHOICE

## 2022-08-16 NOTE — TELEPHONE ENCOUNTER
Rx Refill Note  Requested Prescriptions     Pending Prescriptions Disp Refills   • dexlansoprazole (DEXILANT) 60 MG capsule [Pharmacy Med Name: DEXLANSOPRAZOLE DR 60 MG CAP] 90 capsule 0     Sig: TAKE 1 CAPSULE BY MOUTH EVERY DAY      Last office visit with prescribing clinician: 3/22/2022      Next office visit with prescribing clinician: 9/22/2022   {TIP  Encounters:     Proton Pump Inhibitors Protocol Failed 08/16/2022 12:59 AM    No osteoporosis diagnosis on problem list            {TIP  Please add Last Relevant Lab Date if appropriate  {TIP  Is Refill Pharmacy correct? YES  Theo Sorenson LPN  08/16/22, 08:37 CDT

## 2022-09-02 ENCOUNTER — OFFICE VISIT (OUTPATIENT)
Dept: FAMILY MEDICINE CLINIC | Facility: CLINIC | Age: 67
End: 2022-09-02

## 2022-09-02 ENCOUNTER — TELEPHONE (OUTPATIENT)
Dept: FAMILY MEDICINE CLINIC | Facility: CLINIC | Age: 67
End: 2022-09-02

## 2022-09-02 VITALS
HEIGHT: 68 IN | HEART RATE: 83 BPM | BODY MASS INDEX: 24.25 KG/M2 | OXYGEN SATURATION: 97 % | WEIGHT: 160 LBS | SYSTOLIC BLOOD PRESSURE: 162 MMHG | DIASTOLIC BLOOD PRESSURE: 84 MMHG | TEMPERATURE: 96.6 F

## 2022-09-02 DIAGNOSIS — J04.0 LARYNGITIS: ICD-10-CM

## 2022-09-02 DIAGNOSIS — J06.9 UPPER RESPIRATORY TRACT INFECTION, UNSPECIFIED TYPE: Primary | ICD-10-CM

## 2022-09-02 PROCEDURE — 99213 OFFICE O/P EST LOW 20 MIN: CPT | Performed by: NURSE PRACTITIONER

## 2022-09-02 RX ORDER — HYDROXYZINE HYDROCHLORIDE 25 MG/1
25-50 TABLET, FILM COATED ORAL NIGHTLY PRN
COMMUNITY
Start: 2022-08-24

## 2022-09-02 RX ORDER — LATANOPROST 50 UG/ML
1 SOLUTION/ DROPS OPHTHALMIC
COMMUNITY

## 2022-09-02 RX ORDER — AZITHROMYCIN 250 MG/1
TABLET, FILM COATED ORAL
Qty: 6 TABLET | Refills: 0 | Status: SHIPPED | OUTPATIENT
Start: 2022-09-02 | End: 2022-09-22

## 2022-09-02 RX ORDER — KETOCONAZOLE 20 MG/ML
SHAMPOO TOPICAL
COMMUNITY
Start: 2022-08-24 | End: 2022-11-07

## 2022-09-02 NOTE — PATIENT INSTRUCTIONS
Upper Respiratory Infection, Adult  An upper respiratory infection (URI) is a common viral infection of the nose, throat, and upper air passages that lead to the lungs. The most common type of URI is the common cold. URIs usually get better on their own, without medical treatment.  What are the causes?  A URI is caused by a virus. You may catch a virus by:  Breathing in droplets from an infected person's cough or sneeze.  Touching something that has been exposed to the virus (contaminated) and then touching your mouth, nose, or eyes.  What increases the risk?  You are more likely to get a URI if:  You are very young or very old.  It is [place] or winter.  You have close contact with others, such as at a , school, or health care facility.  You smoke.  You have long-term (chronic) heart or lung disease.  You have a weakened disease-fighting (immune) system.  You have nasal allergies or asthma.  You are experiencing a lot of stress.  You work in an area that has poor air circulation.  You have poor nutrition.  What are the signs or symptoms?  A URI usually involves some of the following symptoms:  Runny or stuffy (congested) nose.  Sneezing.  Cough.  Sore throat.  Headache.  Fatigue.  Fever.  Loss of appetite.  Pain in your forehead, behind your eyes, and over your cheekbones (sinus pain).  Muscle aches.  Redness or irritation of the eyes.  Pressure in the ears or face.  How is this diagnosed?  This condition may be diagnosed based on your medical history and symptoms, and a physical exam. Your health care provider may use a cotton swab to take a mucus sample from your nose (nasal swab). This sample can be tested to determine what virus is causing the illness.  How is this treated?  URIs usually get better on their own within 7-10 days. You can take steps at home to relieve your symptoms. Medicines cannot cure URIs, but your health care provider may recommend certain medicines to help relieve symptoms, such  as:  Over-the-counter cold medicines.  Cough suppressants. Coughing is a type of defense against infection that helps to clear the respiratory system, so take these medicines only as recommended by your health care provider.  Fever-reducing medicines.  Follow these instructions at home:  Activity  Rest as needed.  If you have a fever, stay home from work or school until your fever is gone or until your health care provider says you are no longer contagious. Your health care provider may have you wear a face mask to prevent your infection from spreading.  Relieving symptoms  Gargle with a salt-water mixture 3-4 times a day or as needed. To make a salt-water mixture, completely dissolve ½-1 tsp of salt in 1 cup of warm water.  Use a cool-mist humidifier to add moisture to the air. This can help you breathe more easily.  Eating and drinking    Drink enough fluid to keep your urine pale yellow.  Eat soups and other clear broths.    General instructions    Take over-the-counter and prescription medicines only as told by your health care provider. These include cold medicines, fever reducers, and cough suppressants.  Do not use any products that contain nicotine or tobacco, such as cigarettes and e-cigarettes. If you need help quitting, ask your health care provider.  Stay away from secondhand smoke.  Stay up to date on all immunizations, including the yearly (annual) flu vaccine.  Keep all follow-up visits as told by your health care provider. This is important.    How to prevent the spread of infection to others    URIs can be passed from person to person (are contagious). To prevent the infection from spreading:  Wash your hands often with soap and water. If soap and water are not available, use hand .  Avoid touching your mouth, face, eyes, or nose.  Cough or sneeze into a tissue or your sleeve or elbow instead of into your hand or into the air.    Contact a health care provider if:  You are getting worse  instead of better.  You have a fever or chills.  Your mucus is brown or red.  You have yellow or brown discharge coming from your nose.  You have pain in your face, especially when you bend forward.  You have swollen neck glands.  You have pain while swallowing.  You have white areas in the back of your throat.  Get help right away if:  You have shortness of breath that gets worse.  You have severe or persistent:  Headache.  Ear pain.  Sinus pain.  Chest pain.  You have chronic lung disease along with any of the following:  Wheezing.  Prolonged cough.  Coughing up blood.  A change in your usual mucus.  You have a stiff neck.  You have changes in your:  Vision.  Hearing.  Thinking.  Mood.  Summary  An upper respiratory infection (URI) is a common infection of the nose, throat, and upper air passages that lead to the lungs.  A URI is caused by a virus.  URIs usually get better on their own within 7-10 days.  Medicines cannot cure URIs, but your health care provider may recommend certain medicines to help relieve symptoms.  This information is not intended to replace advice given to you by your health care provider. Make sure you discuss any questions you have with your health care provider.  Document Revised: 08/26/2021 Document Reviewed: 08/26/2021  [employer] Patient Education © 2021 [employer]

## 2022-09-02 NOTE — PROGRESS NOTES
"Chief Complaint  Cough and Hoarse    Subjective          Genesis Mery Salgado presents to [facility] - [facility]    PCP: TEDDY Gloria    CC: \"cough, hoarse\"    Symptoms x 10+ days.  Had recent allergy to latex and lupus flare and received Kenalog injection and prednisone which she is still taking from rheumatology.  Thought this would help, but it hasn't.  Cough is dry, sometimes tight.  Some sinus drainage, occasional sinus headache.  No fever, body aches, chills or dyspnea reported.  Long hx of allergies, been sleeping with a fan in the recliner since injured her knee. Normally takes Allegra D, but has to  Rx from the pharmacy.      Recovering from fractured patella, seeing ortho in [place] and in brace and doing therapy and has recheck in 2 weeks    Cough  This is a new problem. The current episode started 1 to 4 weeks ago. The problem has been unchanged. The cough is non-productive. Associated symptoms include headaches (occasional), heartburn ( on dexilant, was given generic, seems a little worse than normal), nasal congestion and postnasal drip. Pertinent negatives include no chest pain, chills, ear congestion, ear pain, fever, hemoptysis, myalgias, rash, rhinorrhea, sore throat, shortness of breath, sweats, weight loss or wheezing. Exacerbated by: seems worse since she has been using a fan while sleeping. Treatments tried: steroids. The treatment provided no relief. Her past medical history is significant for environmental allergies. +lupus       Review of Systems   Constitutional: Negative.  Negative for chills, fever and weight loss.   HENT: Positive for congestion, postnasal drip, sinus pressure and voice change. Negative for ear discharge, ear pain, rhinorrhea, sinus pain, sneezing, sore throat and trouble swallowing.    Eyes: Negative.    Respiratory: Positive for cough. Negative for hemoptysis, chest tightness, shortness " "of breath and wheezing.    Cardiovascular: Negative.  Negative for chest pain.   Gastrointestinal: Positive for heartburn ( on dexilant, was given generic, seems a little worse than normal). Negative for diarrhea, nausea and vomiting.   Genitourinary: Negative.    Musculoskeletal: Positive for arthralgias (right knee--in brace). Negative for myalgias.   Skin: Negative.  Negative for rash.   Allergic/Immunologic: Positive for environmental allergies.   Neurological: Positive for headaches (occasional). Negative for dizziness.        Objective   Vital Signs:   /84 (BP Location: Right arm, Patient Position: Sitting, Cuff Size: Adult)   Pulse 83   Temp 96.6 °F (35.9 °C)   Ht 172.7 cm (68\")   Wt 72.6 kg (160 lb)   SpO2 97%   BMI 24.33 kg/m²       Physical Exam  Vitals and nursing note reviewed.   Constitutional:       General: She is not in acute distress.     Appearance: Normal appearance. She is not ill-appearing.   HENT:      Head: Normocephalic and atraumatic.      Right Ear: Tympanic membrane and ear canal normal.      Left Ear: Tympanic membrane and ear canal normal.      Nose: Congestion ( mild) present.      Comments: Minimal sinus pressure, frontal, no significant pain      Mouth/Throat:      Mouth: Mucous membranes are moist.      Pharynx: No oropharyngeal exudate or posterior oropharyngeal erythema.      Comments: +PND  +hoarseness  Eyes:      General:         Right eye: No discharge.         Left eye: No discharge.      Conjunctiva/sclera: Conjunctivae normal.   Cardiovascular:      Rate and Rhythm: Normal rate and regular rhythm.   Pulmonary:      Effort: Pulmonary effort is normal. No respiratory distress.      Breath sounds: No wheezing, rhonchi or rales.      Comments: Slightly tight cough  Musculoskeletal:      Cervical back: Neck supple. No tenderness.      Comments: Brace noted to right knee   Lymphadenopathy:      Cervical: No cervical adenopathy.   Skin:     General: Skin is warm and dry. "   Neurological:      General: No focal deficit present.      Mental Status: She is alert and oriented to person, place, and time.   Psychiatric:         Mood and Affect: Mood normal.         Thought Content: Thought content normal.          Result Review :     Common labs    Common Labsle 1/21/22 4/27/22 4/27/22 4/27/22 4/27/22 8/4/22     0904 0904 0904 0904    Glucose   88      BUN   11      Creatinine   0.88      Sodium   140      Potassium   4.6      Chloride   104      Calcium 8.9  9.2   9.3   Albumin   3.90      Total Bilirubin   0.4      Alkaline Phosphatase   123 (A)      AST (SGOT)   14      ALT (SGPT)   9      WBC  4.58       Hemoglobin  13.7       Hematocrit  39.2       Platelets  260       Total Cholesterol    179     Triglycerides    115     HDL Cholesterol    56     LDL Cholesterol     102 (A)     Hemoglobin A1C     5.50    (A) Abnormal value                      Assessment and Plan    Diagnoses and all orders for this visit:    1. Upper respiratory tract infection, unspecified type (Primary)  -     azithromycin (Zithromax Z-Nikita) 250 MG tablet; Take 2 tablets by mouth on day 1, then 1 tablet daily on days 2-5  Dispense: 6 tablet; Refill: 0    2. Laryngitis  -     azithromycin (Zithromax Z-Nikita) 250 MG tablet; Take 2 tablets by mouth on day 1, then 1 tablet daily on days 2-5  Dispense: 6 tablet; Refill: 0      Push fluids  Rest  Tylenol as needed  Rx for Zithromax due to symptoms > 10 days  Will restart Allegra D  Throat lozenges, honey PRN  Cool mist humidifier PRN    See PCP or RTC if symptoms persist/worsen  See PCP for routine f/u visit and management of chronic medical conditions      This document has been electronically signed by TEDDY Walters on September 2, 2022 12:00 CDT,.      .

## 2022-09-02 NOTE — TELEPHONE ENCOUNTER
Pt came in the office today to see Yanick and stated that she was told that she needs to take 1000 mg more of calcium, however, she can't find it without vit D in it. The pharmacist told her that since she is already on Vit once a week that she doesn't need to get calcium with vit d but she can't find the calcium without vit d in it for 1000 mg.

## 2022-09-02 NOTE — TELEPHONE ENCOUNTER
It appears that this was discussed with her by the [facility] and per their note on 6/27/2022.  They did not that she was taking prescription vitamin D supplement, but stated the following:    She is currently not taking calcium supplementation I have recommended that she start taking a calcium supplement once or twice daily, such as Citracal or Caltrate, which can be purchased over-the-counter.

## 2022-09-22 ENCOUNTER — OFFICE VISIT (OUTPATIENT)
Dept: FAMILY MEDICINE CLINIC | Facility: CLINIC | Age: 67
End: 2022-09-22

## 2022-09-22 VITALS
HEIGHT: 68 IN | TEMPERATURE: 98.3 F | BODY MASS INDEX: 24.64 KG/M2 | RESPIRATION RATE: 20 BRPM | OXYGEN SATURATION: 98 % | DIASTOLIC BLOOD PRESSURE: 78 MMHG | WEIGHT: 162.6 LBS | HEART RATE: 86 BPM | SYSTOLIC BLOOD PRESSURE: 120 MMHG

## 2022-09-22 DIAGNOSIS — M32.19 OTHER SYSTEMIC LUPUS ERYTHEMATOSUS WITH OTHER ORGAN INVOLVEMENT: Chronic | ICD-10-CM

## 2022-09-22 DIAGNOSIS — J30.2 SEASONAL ALLERGIES: Chronic | ICD-10-CM

## 2022-09-22 DIAGNOSIS — Z12.31 ENCOUNTER FOR SCREENING MAMMOGRAM FOR BREAST CANCER: ICD-10-CM

## 2022-09-22 DIAGNOSIS — E55.9 VITAMIN D DEFICIENCY DISEASE: Chronic | ICD-10-CM

## 2022-09-22 DIAGNOSIS — M81.0 AGE-RELATED OSTEOPOROSIS WITHOUT CURRENT PATHOLOGICAL FRACTURE: Chronic | ICD-10-CM

## 2022-09-22 DIAGNOSIS — R12 HEARTBURN: Primary | Chronic | ICD-10-CM

## 2022-09-22 DIAGNOSIS — R09.81 SINUS CONGESTION: ICD-10-CM

## 2022-09-22 PROCEDURE — 99214 OFFICE O/P EST MOD 30 MIN: CPT | Performed by: NURSE PRACTITIONER

## 2022-09-22 RX ORDER — LEVOFLOXACIN 500 MG/1
500 TABLET, FILM COATED ORAL DAILY
Qty: 7 TABLET | Refills: 0 | Status: SHIPPED | OUTPATIENT
Start: 2022-09-22 | End: 2022-11-07

## 2022-09-22 RX ORDER — DEXLANSOPRAZOLE 60 MG/1
60 CAPSULE, DELAYED RELEASE ORAL DAILY
Qty: 90 CAPSULE | Refills: 3 | Status: SHIPPED | OUTPATIENT
Start: 2022-09-22 | End: 2022-10-04 | Stop reason: SDUPTHER

## 2022-10-03 ENCOUNTER — TELEPHONE (OUTPATIENT)
Dept: FAMILY MEDICINE CLINIC | Facility: CLINIC | Age: 67
End: 2022-10-03

## 2022-10-03 DIAGNOSIS — R12 HEARTBURN: ICD-10-CM

## 2022-10-03 NOTE — TELEPHONE ENCOUNTER
Patient called and stated that she needs provider to call into her local pharmacy Dexilant as she can not get it from her mail order for at least a month and knows that she will have to pay for it.  Patient would like a return call

## 2022-10-04 DIAGNOSIS — E55.9 VITAMIN D DEFICIENCY DISEASE: ICD-10-CM

## 2022-10-04 RX ORDER — DEXLANSOPRAZOLE 60 MG/1
60 CAPSULE, DELAYED RELEASE ORAL DAILY
Qty: 90 CAPSULE | Refills: 3 | Status: SHIPPED | OUTPATIENT
Start: 2022-10-04 | End: 2022-11-03

## 2022-10-04 NOTE — PROGRESS NOTES
Subjective   Genesis Salgado is a 67 y.o. female.     History of Present Illness  She presents today for her routine follow-up on chronic medical problems.  She reports that her allergy symptoms are currently fairly well controlled, however, she has been experiencing some sinus congestion recently.  She would like something to help with this if possible.  Her motion sickness is also well controlled.  She continues to follow-up with rheumatology regarding her SLE.  She is tolerating her calcium and vitamin D supplements without side effect.  She reports that her heartburn is well controlled with Dexilant.  Her routine fasting labs are up-to-date.  She is due for screening mammogram.  Her BMI is currently 24.7%.  She is otherwise without any other new complaints today in the office.  Heartburn  She complains of belching and a sore throat. She reports no abdominal pain, no chest pain, no choking, no coughing, no dysphagia, no early satiety, no globus sensation, no hoarse voice, no nausea, no stridor, no tooth decay, no water brash or no wheezing. This is a recurrent problem. The current episode started more than 1 month ago. The problem occurs frequently. The problem has been waxing and waning. Pertinent negatives include no anemia, melena, muscle weakness, orthopnea or weight loss. Risk factors include hiatal hernia. She has tried a PPI for the symptoms. The treatment provided moderate relief.        The following portions of the patient's history were reviewed and updated as appropriate: allergies, current medications, past family history, past medical history, past social history, past surgical history and problem list.    Review of Systems   Constitutional: Negative.  Negative for unexpected weight loss.   HENT: Positive for congestion, postnasal drip, rhinorrhea, sneezing and sore throat. Negative for hoarse voice.    Eyes: Negative.    Respiratory: Negative.  Negative for cough, choking and wheezing.     Cardiovascular: Negative.  Negative for chest pain.   Gastrointestinal: Negative.  Negative for abdominal pain, dysphagia, melena and nausea.   Musculoskeletal: Negative.  Negative for muscle weakness.   Skin: Negative.    Allergic/Immunologic: Negative.    Neurological: Negative.    Hematological: Negative.    Psychiatric/Behavioral: Negative.        Objective   Physical Exam  Vitals reviewed.   Constitutional:       General: She is not in acute distress.     Appearance: She is well-developed. She is not diaphoretic.   HENT:      Head: Normocephalic.      Right Ear: External ear normal.      Left Ear: External ear normal.      Nose: Congestion and rhinorrhea present.   Eyes:      Pupils: Pupils are equal, round, and reactive to light.   Neck:      Thyroid: No thyromegaly.      Vascular: No JVD.   Cardiovascular:      Rate and Rhythm: Normal rate and regular rhythm.      Heart sounds: No murmur heard.    No friction rub. No gallop.   Pulmonary:      Effort: Pulmonary effort is normal. No respiratory distress.      Breath sounds: Normal breath sounds. No wheezing or rales.   Musculoskeletal:         General: Normal range of motion.      Cervical back: Normal range of motion and neck supple.   Skin:     General: Skin is warm and dry.      Coloration: Skin is not pale.      Findings: No erythema or rash.   Neurological:      Mental Status: She is alert and oriented to person, place, and time.   Psychiatric:         Behavior: Behavior normal.         Thought Content: Thought content normal.         Judgment: Judgment normal.           Assessment & Plan   Diagnoses and all orders for this visit:    1. Heartburn (Primary)  -     Discontinue: Dexilant 60 MG capsule; Take 1 capsule by mouth Daily for 30 days. (BRAND NAME ONLY)  Dispense: 90 capsule; Refill: 3    2. Encounter for screening mammogram for breast cancer  -     Mammo Screening Bilateral With CAD; Future    3. Other systemic lupus erythematosus with other organ  involvement (HCC)    4. Vitamin D deficiency disease    5. Seasonal allergies    6. Age-related osteoporosis without current pathological fracture    7. Sinus congestion  -     levoFLOXacin (Levaquin) 500 MG tablet; Take 1 tablet by mouth Daily.  Dispense: 7 tablet; Refill: 0               BMI is within normal parameters. No other follow-up for BMI required.    Schedule for screening mammogram.  Plenty of fluids.  Finish all antibiotics.  Continue current medications.  Follow up as scheduled for routine follow up.  Follow up sooner for problems/concerns.  Patient verbalized understanding and agreement with plan of care.        This document has been electronically signed by TEDDY Zelaya on October 4, 2022 12:47 CDT

## 2022-10-05 RX ORDER — ERGOCALCIFEROL 1.25 MG/1
CAPSULE ORAL
Qty: 12 CAPSULE | Refills: 3 | Status: SHIPPED | OUTPATIENT
Start: 2022-10-05

## 2022-11-07 ENCOUNTER — OFFICE VISIT (OUTPATIENT)
Dept: FAMILY MEDICINE CLINIC | Facility: CLINIC | Age: 67
End: 2022-11-07

## 2022-11-07 VITALS
BODY MASS INDEX: 24.05 KG/M2 | WEIGHT: 158.7 LBS | DIASTOLIC BLOOD PRESSURE: 80 MMHG | OXYGEN SATURATION: 98 % | HEART RATE: 80 BPM | SYSTOLIC BLOOD PRESSURE: 140 MMHG | TEMPERATURE: 97.3 F | HEIGHT: 68 IN | RESPIRATION RATE: 20 BRPM

## 2022-11-07 DIAGNOSIS — Z00.00 MEDICARE ANNUAL WELLNESS VISIT, SUBSEQUENT: Primary | ICD-10-CM

## 2022-11-07 DIAGNOSIS — Z12.11 SCREEN FOR COLON CANCER: ICD-10-CM

## 2022-11-07 DIAGNOSIS — R49.0 HOARSENESS: ICD-10-CM

## 2022-11-07 PROCEDURE — G0439 PPPS, SUBSEQ VISIT: HCPCS | Performed by: NURSE PRACTITIONER

## 2022-11-07 PROCEDURE — 1170F FXNL STATUS ASSESSED: CPT | Performed by: NURSE PRACTITIONER

## 2022-11-07 PROCEDURE — 1126F AMNT PAIN NOTED NONE PRSNT: CPT | Performed by: NURSE PRACTITIONER

## 2022-11-07 PROCEDURE — 1159F MED LIST DOCD IN RCRD: CPT | Performed by: NURSE PRACTITIONER

## 2022-11-07 RX ORDER — PANTOPRAZOLE SODIUM 40 MG
40 TABLET, DELAYED RELEASE (ENTERIC COATED) ORAL DAILY
Qty: 90 TABLET | Refills: 3 | Status: SHIPPED | OUTPATIENT
Start: 2022-11-07 | End: 2023-03-22 | Stop reason: ALTCHOICE

## 2022-11-07 RX ORDER — METHYLPREDNISOLONE 4 MG/1
TABLET ORAL
Qty: 21 TABLET | Refills: 0 | Status: SHIPPED | OUTPATIENT
Start: 2022-11-07 | End: 2023-03-22

## 2022-11-07 NOTE — PATIENT INSTRUCTIONS
Medicare Wellness  Personal Prevention Plan of Service     Date of Office Visit:    Encounter Provider:  TEDDY Zelaya  Place of Service:  [facility]  Patient Name: Genesis Salgado  :  1955    As part of the Medicare Wellness portion of your visit today, we are providing you with this personalized preventive plan of services (PPPS). This plan is based upon recommendations of the [place] Preventive Services Task Force (USPSTF) and the Advisory Committee on Immunization Practices (ACIP).    This lists the preventive care services that should be considered, and provides dates of when you are due. Items listed as completed are up-to-date and do not require any further intervention.    Health Maintenance   Topic Date Due    Pneumococcal Vaccine 65+ (1 - PCV) Never done    TDAP/TD VACCINES (1 - Tdap) Never done    PAP SMEAR  2021    COVID-19 Vaccine (4 - Booster for Pfizer series) 2022    INFLUENZA VACCINE  2022    ZOSTER VACCINE (1 of 2) 2023 (Originally 2003)    ANNUAL WELLNESS VISIT  2023    MAMMOGRAM  2023    DXA SCAN  2024    COLORECTAL CANCER SCREENING  10/30/2025    HEPATITIS C SCREENING  Completed       Orders Placed This Encounter   Procedures    Ambulatory Referral to General Surgery     Referral Priority:   Routine     Referral Type:   Consultation     Referral Reason:   Specialty Services Required     Referred to Provider:   Evelyn Leon MD     Requested Specialty:   General Surgery     Number of Visits Requested:   1       Return in about 1 year (around 2023) for Medicare Wellness.

## 2022-11-07 NOTE — PROGRESS NOTES
The ABCs of the Annual Wellness Visit  Subsequent Medicare Wellness Visit    Chief Complaint   Patient presents with   • Annual Exam     Medicare Wellness Subsequent       Subjective    History of Present Illness:  Genesis Salgado is a 67 y.o. female who presents for a Subsequent Medicare Wellness Visit.    The following portions of the patient's history were reviewed and   updated as appropriate: allergies, current medications, past family history, past medical history, past social history, past surgical history and problem list.    Compared to one year ago, the patient feels her physical   health is the same.    Compared to one year ago, the patient feels her mental   health is the same.    Recent Hospitalizations:  She was not admitted to the hospital during the last year.       Current Medical Providers:  Patient Care Team:  Daria Douglass APRN as PCP - General (Family Medicine)    Outpatient Medications Prior to Visit   Medication Sig Dispense Refill   • calcium carbonate (OS-YADI) 600 MG tablet Every 6 (Six) Hours.     • fexofenadine-pseudoephedrine (ALLEGRA-D 24) 180-240 MG per 24 hr tablet Take 1 tablet by mouth Daily. 90 tablet 1   • hydroxychloroquine (PLAQUENIL) 200 MG tablet TAKE 1 TABLET DAILY 90 tablet 0   • hydrOXYzine (ATARAX) 25 MG tablet Take 25-50 mg by mouth At Night As Needed.     • latanoprost (XALATAN) 0.005 % ophthalmic solution Apply 1 drop to eye(s) as directed by provider. Apply 1 drop to eye(s) as directed by provider.     • meclizine (ANTIVERT) 25 MG tablet TAKE 1 TABLET 3 TIMES A DAYAS NEEDED FOR DIZZINESS 270 tablet 1   • vitamin D (ERGOCALCIFEROL) 1.25 MG (00728 UT) capsule capsule TAKE 1 CAPSULE EVERY 7 DAYS 12 capsule 3   • ketoconazole (NIZORAL) 2 % shampoo USE TO 2 TO 3 TIMES A WEEK AS NEEDED     • levoFLOXacin (Levaquin) 500 MG tablet Take 1 tablet by mouth Daily. 7 tablet 0     No facility-administered medications prior to visit.       No opioid medication identified on active  "medication list. I have reviewed chart for other potential  high risk medication/s and harmful drug interactions in the elderly.          Aspirin is not on active medication list.  Aspirin use is not indicated based on review of current medical condition/s. Risk of harm outweighs potential benefits.  .    Patient Active Problem List   Diagnosis   • Systemic lupus erythematosus (HCC)   • Vitamin D deficiency disease   • History of osteoporosis   • Acute serous otitis media of left ear   • Seasonal allergies   • Hoarseness   • Acute nonintractable headache   • Thumb pain, right   • Swelling of right thumb   • Osteoporosis   • History of vitamin D deficiency   • History of compression fracture of vertebral column   • Postmenopausal   • History of chemotherapy     Advance Care Planning  Advance Directive is not on file.  ACP discussion was held with the patient during this visit. Patient has an advance directive (not in EMR), copy requested.          Objective    Vitals:    11/07/22 0949   BP: 140/80   BP Location: Left arm   Patient Position: Sitting   Cuff Size: Adult   Pulse: 80   Resp: 20   Temp: 97.3 °F (36.3 °C)   TempSrc: Temporal   SpO2: 98%   Weight: 72 kg (158 lb 11.2 oz)   Height: 172.7 cm (68\")   PainSc: 0-No pain     Estimated body mass index is 24.13 kg/m² as calculated from the following:    Height as of this encounter: 172.7 cm (68\").    Weight as of this encounter: 72 kg (158 lb 11.2 oz).    BMI is within normal parameters. No other follow-up for BMI required.      Does the patient have evidence of cognitive impairment? No    Physical Exam            HEALTH RISK ASSESSMENT    Smoking Status:  Social History     Tobacco Use   Smoking Status Never   Smokeless Tobacco Never     Alcohol Consumption:  Social History     Substance and Sexual Activity   Alcohol Use No     Fall Risk Screen:    STEADI Fall Risk Assessment was completed, and patient is at HIGH risk for falls. Assessment completed " on:11/7/2022    Depression Screening:  PHQ-2/PHQ-9 Depression Screening 11/7/2022   Retired PHQ-9 Total Score -   Retired Total Score -   Little Interest or Pleasure in Doing Things 0-->not at all   Feeling Down, Depressed or Hopeless 0-->not at all   PHQ-9: Brief Depression Severity Measure Score 0       Health Habits and Functional and Cognitive Screening:  Functional & Cognitive Status 11/7/2022   Do you have difficulty preparing food and eating? No   Do you have difficulty bathing yourself, getting dressed or grooming yourself? No   Do you have difficulty using the toilet? No   Do you have difficulty moving around from place to place? No   Do you have trouble with steps or getting out of a bed or a chair? No   Current Diet Other   Dental Exam Up to date        Dental Exam Comment Dr Cleary   Eye Exam Up to date        Eye Exam Comment Stephen   Exercise (times per week) 4 times per week   Current Exercises Include Dancing   Current Exercise Activities Include -   Do you need help using the phone?  -   Are you deaf or do you have serious difficulty hearing?  -   Do you need help with transportation? -   Do you need help shopping? -   Do you need help preparing meals?  -   Do you need help with housework?  -   Do you need help with laundry? -   Do you need help taking your medications? -   Do you need help managing money? -   Do you ever drive or ride in a car without wearing a seat belt? -   Have you felt unusual stress, anger or loneliness in the last month? No   Who do you live with? Spouse   If you need help, do you have trouble finding someone available to you? No   Have you been bothered in the last four weeks by sexual problems? -   Do you have difficulty concentrating, remembering or making decisions? No       Age-appropriate Screening Schedule:  Refer to the list below for future screening recommendations based on patient's age, sex and/or medical conditions. Orders for these recommended tests are listed  in the plan section. The patient has been provided with a written plan.    Health Maintenance   Topic Date Due   • INFLUENZA VACCINE  03/31/2023 (Originally 8/1/2022)   • ZOSTER VACCINE (1 of 2) 09/01/2023 (Originally 1/21/2003)   • TDAP/TD VACCINES (1 - Tdap) 11/07/2023 (Originally 1/21/1974)   • MAMMOGRAM  12/30/2023   • DXA SCAN  05/31/2024   • PAP SMEAR  11/07/2027              Assessment & Plan   CMS Preventative Services Quick Reference  Risk Factors Identified During Encounter  Immunizations Discussed/Encouraged (specific Immunizations; Tdap, Influenza, Prevnar 20 (Pneumococcal 20-valent conjugate) and COVID19  The above risks/problems have been discussed with the patient.  Follow up actions/plans if indicated are seen below in the Assessment/Plan Section.  Pertinent information has been shared with the patient in the After Visit Summary.    Diagnoses and all orders for this visit:    1. Medicare annual wellness visit, subsequent (Primary)    2. Screen for colon cancer  -     Ambulatory Referral to General Surgery    3. Hoarseness  -     Ambulatory Referral to General Surgery  -     methylPREDNISolone (MEDROL) 4 MG dose pack; Take as directed on package instructions.  Dispense: 21 tablet; Refill: 0    Other orders  -     Protonix 40 MG EC tablet; Take 1 tablet by mouth Daily.  Dispense: 90 tablet; Refill: 3        Follow Up:   Return in about 1 year (around 11/7/2023), or 45 MINS, for Medicare Wellness.     An After Visit Summary and PPPS were made available to the patient.    Medicare Wellness exam completed by Kandy Scott MA at [facility].  See attached documents.  Patient declines TDAP, PCV20, COVID vaccine, flu shot, and PAP smear.    She is due for colonoscopy and EGD.  She does have chronic hoarseness.  She would like to have this evaluated.        This document has been electronically signed by TEDDY Zelaya on November 7, 2022 13:34 CST

## 2022-11-08 RX ORDER — PANTOPRAZOLE SODIUM 40 MG
40 TABLET, DELAYED RELEASE (ENTERIC COATED) ORAL DAILY
Qty: 90 TABLET | Refills: 3 | OUTPATIENT
Start: 2022-11-08

## 2022-12-20 DIAGNOSIS — J06.9 ACUTE URI: ICD-10-CM

## 2022-12-20 RX ORDER — FEXOFENADINE HYDROCHLORIDE AND PSEUDOEPHEDRINE HYDROCHLORIDE 180; 240 MG/1; MG/1
TABLET, FILM COATED, EXTENDED RELEASE ORAL
Qty: 30 TABLET | Refills: 5 | Status: SHIPPED | OUTPATIENT
Start: 2022-12-20

## 2022-12-20 RX ORDER — FEXOFENADINE HCL AND PSEUDOEPHEDRINE HCI 180; 240 MG/1; MG/1
1 TABLET, EXTENDED RELEASE ORAL DAILY
Qty: 90 TABLET | Refills: 1 | OUTPATIENT
Start: 2022-12-20

## 2023-02-08 ENCOUNTER — LAB (OUTPATIENT)
Dept: LAB | Facility: HOSPITAL | Age: 68
End: 2023-02-08
Payer: MEDICARE

## 2023-02-08 ENCOUNTER — TELEPHONE (OUTPATIENT)
Dept: ONCOLOGY | Facility: HOSPITAL | Age: 68
End: 2023-02-08
Payer: MEDICARE

## 2023-02-08 DIAGNOSIS — M81.0 OSTEOPOROSIS WITHOUT CURRENT PATHOLOGICAL FRACTURE, UNSPECIFIED OSTEOPOROSIS TYPE: ICD-10-CM

## 2023-02-08 PROCEDURE — 82310 ASSAY OF CALCIUM: CPT

## 2023-02-08 NOTE — TELEPHONE ENCOUNTER
Informed pt about the need for lab work prior to Prolia. She stated she would go to the clinic in [place] today. Orders are in.

## 2023-02-09 LAB — CALCIUM SPEC-SCNC: 9.6 MG/DL (ref 8.6–10.5)

## 2023-02-13 ENCOUNTER — INFUSION (OUTPATIENT)
Dept: ONCOLOGY | Facility: HOSPITAL | Age: 68
End: 2023-02-13
Payer: MEDICARE

## 2023-02-13 VITALS
TEMPERATURE: 97.3 F | HEART RATE: 75 BPM | DIASTOLIC BLOOD PRESSURE: 67 MMHG | RESPIRATION RATE: 18 BRPM | SYSTOLIC BLOOD PRESSURE: 138 MMHG

## 2023-02-13 DIAGNOSIS — M81.0 OSTEOPOROSIS WITHOUT CURRENT PATHOLOGICAL FRACTURE, UNSPECIFIED OSTEOPOROSIS TYPE: Primary | ICD-10-CM

## 2023-02-13 PROCEDURE — 25010000002 DENOSUMAB 60 MG/ML SOLUTION PREFILLED SYRINGE: Performed by: NURSE PRACTITIONER

## 2023-02-13 PROCEDURE — 96372 THER/PROPH/DIAG INJ SC/IM: CPT | Performed by: NURSE PRACTITIONER

## 2023-02-13 RX ADMIN — DENOSUMAB 60 MG: 60 INJECTION SUBCUTANEOUS at 10:26

## 2023-03-22 ENCOUNTER — OFFICE VISIT (OUTPATIENT)
Dept: FAMILY MEDICINE CLINIC | Facility: CLINIC | Age: 68
End: 2023-03-22
Payer: MEDICARE

## 2023-03-22 VITALS
DIASTOLIC BLOOD PRESSURE: 84 MMHG | RESPIRATION RATE: 20 BRPM | WEIGHT: 160 LBS | HEART RATE: 86 BPM | OXYGEN SATURATION: 97 % | BODY MASS INDEX: 24.25 KG/M2 | HEIGHT: 68 IN | SYSTOLIC BLOOD PRESSURE: 122 MMHG | TEMPERATURE: 96.7 F

## 2023-03-22 DIAGNOSIS — R12 HEARTBURN: Primary | ICD-10-CM

## 2023-03-22 DIAGNOSIS — M81.0 AGE-RELATED OSTEOPOROSIS WITHOUT CURRENT PATHOLOGICAL FRACTURE: ICD-10-CM

## 2023-03-22 DIAGNOSIS — Z13.29 SCREENING FOR THYROID DISORDER: ICD-10-CM

## 2023-03-22 DIAGNOSIS — M32.19 OTHER SYSTEMIC LUPUS ERYTHEMATOSUS WITH OTHER ORGAN INVOLVEMENT: ICD-10-CM

## 2023-03-22 DIAGNOSIS — Z13.220 SCREENING FOR LIPOID DISORDERS: ICD-10-CM

## 2023-03-22 DIAGNOSIS — R05.9 COUGH IN ADULT: ICD-10-CM

## 2023-03-22 DIAGNOSIS — R09.81 SINUS CONGESTION: ICD-10-CM

## 2023-03-22 DIAGNOSIS — E55.9 VITAMIN D DEFICIENCY DISEASE: ICD-10-CM

## 2023-03-22 DIAGNOSIS — R79.9 ABNORMAL FINDING OF BLOOD CHEMISTRY, UNSPECIFIED: ICD-10-CM

## 2023-03-22 DIAGNOSIS — J30.2 SEASONAL ALLERGIES: ICD-10-CM

## 2023-03-22 DIAGNOSIS — Z13.1 SCREENING FOR DIABETES MELLITUS: ICD-10-CM

## 2023-03-22 RX ORDER — GUAIFENESIN 600 MG/1
600 TABLET, EXTENDED RELEASE ORAL 2 TIMES DAILY
Qty: 14 TABLET | Refills: 0 | Status: SHIPPED | OUTPATIENT
Start: 2023-03-22 | End: 2023-03-22 | Stop reason: SDUPTHER

## 2023-03-22 RX ORDER — GUAIFENESIN 600 MG/1
600 TABLET, EXTENDED RELEASE ORAL 2 TIMES DAILY
Qty: 14 TABLET | Refills: 0 | Status: SHIPPED | OUTPATIENT
Start: 2023-03-22 | End: 2023-03-29

## 2023-03-22 RX ORDER — LEVOFLOXACIN 500 MG/1
500 TABLET, FILM COATED ORAL DAILY
Qty: 7 TABLET | Refills: 0 | Status: SHIPPED | OUTPATIENT
Start: 2023-03-22 | End: 2023-03-29

## 2023-03-22 RX ORDER — LEVOFLOXACIN 500 MG/1
500 TABLET, FILM COATED ORAL DAILY
Qty: 7 TABLET | Refills: 0 | Status: SHIPPED | OUTPATIENT
Start: 2023-03-22 | End: 2023-03-22 | Stop reason: SDUPTHER

## 2023-03-22 RX ORDER — DEXLANSOPRAZOLE 60 MG/1
60 CAPSULE, DELAYED RELEASE ORAL DAILY
Qty: 90 CAPSULE | Refills: 3 | Status: SHIPPED | OUTPATIENT
Start: 2023-03-22 | End: 2023-04-21

## 2023-04-05 LAB
EXPIRATION DATE: NORMAL
EXPIRATION DATE: NORMAL
FLUAV AG UPPER RESP QL IA.RAPID: NOT DETECTED
FLUBV AG UPPER RESP QL IA.RAPID: NOT DETECTED
INTERNAL CONTROL: NORMAL
INTERNAL CONTROL: NORMAL
Lab: NORMAL
Lab: NORMAL
S PYO AG THROAT QL: NEGATIVE
SARS-COV-2 AG UPPER RESP QL IA.RAPID: NOT DETECTED

## 2023-04-05 NOTE — PROGRESS NOTES
Subjective   Genesis Salgado is a 68 y.o. female.     History of Present Illness  She presents today for her routine follow-up on chronic medical problems.  She reports that her allergy symptoms are currently fairly well controlled, however, she has been experiencing some sinus congestion recently.  She would like something to help with this if possible.  Her motion sickness is well controlled.  She continues to follow-up with rheumatology regarding her SLE.  She is tolerating her calcium and vitamin D supplements without side effect.  She reports that her heartburn is well controlled with Dexilant.  She does need a refill on this today in the office.  She is due for routine fasting labs.  Her BMI is currently 24.3%.  She is otherwise without any other new complaints today in the office.  Heartburn  She complains of belching, coughing and a sore throat. She reports no abdominal pain, no chest pain, no choking, no dysphagia, no early satiety, no globus sensation, no hoarse voice, no nausea, no stridor, no tooth decay, no water brash or no wheezing. This is a recurrent problem. The current episode started more than 1 month ago. The problem occurs frequently. The problem has been waxing and waning. Pertinent negatives include no anemia, melena, muscle weakness, orthopnea or weight loss. Risk factors include hiatal hernia. She has tried a PPI for the symptoms. The treatment provided moderate relief.        The following portions of the patient's history were reviewed and updated as appropriate: allergies, current medications, past family history, past medical history, past social history, past surgical history and problem list.    Review of Systems   Constitutional: Negative.  Negative for unexpected weight loss.   HENT: Positive for congestion, postnasal drip, rhinorrhea, sinus pressure, sneezing and sore throat. Negative for hoarse voice.    Eyes: Negative.    Respiratory: Positive for cough. Negative for choking  and wheezing.    Cardiovascular: Negative.  Negative for chest pain.   Gastrointestinal: Negative.  Positive for GERD. Negative for abdominal pain, dysphagia, melena and nausea.   Musculoskeletal: Negative.  Negative for muscle weakness.   Skin: Negative.    Allergic/Immunologic: Negative.    Neurological: Negative.    Hematological: Negative.    Psychiatric/Behavioral: Negative.        Objective   Physical Exam  Vitals reviewed.   Constitutional:       General: She is not in acute distress.     Appearance: She is well-developed. She is not diaphoretic.   HENT:      Head: Normocephalic.      Right Ear: External ear normal. A middle ear effusion is present.      Left Ear: External ear normal. A middle ear effusion is present.      Nose: Congestion and rhinorrhea present.   Eyes:      Pupils: Pupils are equal, round, and reactive to light.   Neck:      Thyroid: No thyromegaly.      Vascular: No JVD.   Cardiovascular:      Rate and Rhythm: Normal rate and regular rhythm.      Heart sounds: No murmur heard.    No friction rub. No gallop.   Pulmonary:      Effort: Pulmonary effort is normal. No respiratory distress.      Breath sounds: Normal breath sounds. No wheezing or rales.   Musculoskeletal:         General: Normal range of motion.      Cervical back: Normal range of motion and neck supple.   Skin:     General: Skin is warm and dry.      Coloration: Skin is not pale.      Findings: No erythema or rash.   Neurological:      Mental Status: She is alert and oriented to person, place, and time.   Psychiatric:         Behavior: Behavior normal.         Thought Content: Thought content normal.         Judgment: Judgment normal.           Assessment & Plan   Diagnoses and all orders for this visit:    1. Heartburn (Primary)  -     dexlansoprazole (Dexilant) 60 MG capsule; Take 1 capsule by mouth Daily for 30 days. DEXILANT DR ONLY  Dispense: 90 capsule; Refill: 3  -     CBC & Differential  -     Comprehensive Metabolic  Panel  -     Hemoglobin A1c  -     Lipid Panel  -     TSH  -     Vitamin D,25-Hydroxy    2. Vitamin D deficiency disease  -     CBC & Differential  -     Comprehensive Metabolic Panel  -     Hemoglobin A1c  -     Lipid Panel  -     TSH  -     Vitamin D,25-Hydroxy    3. Seasonal allergies  -     CBC & Differential  -     Comprehensive Metabolic Panel  -     Hemoglobin A1c  -     Lipid Panel  -     TSH  -     Vitamin D,25-Hydroxy    4. Age-related osteoporosis without current pathological fracture  -     CBC & Differential  -     Comprehensive Metabolic Panel  -     Hemoglobin A1c  -     Lipid Panel  -     TSH  -     Vitamin D,25-Hydroxy    5. Other systemic lupus erythematosus with other organ involvement  -     CBC & Differential  -     Comprehensive Metabolic Panel  -     Hemoglobin A1c  -     Lipid Panel  -     TSH  -     Vitamin D,25-Hydroxy    6. Screening for diabetes mellitus  -     CBC & Differential  -     Comprehensive Metabolic Panel  -     Hemoglobin A1c  -     Lipid Panel  -     TSH  -     Vitamin D,25-Hydroxy    7. Screening for lipoid disorders  -     CBC & Differential  -     Comprehensive Metabolic Panel  -     Hemoglobin A1c  -     Lipid Panel  -     TSH  -     Vitamin D,25-Hydroxy    8. Screening for thyroid disorder  -     CBC & Differential  -     Comprehensive Metabolic Panel  -     Hemoglobin A1c  -     Lipid Panel  -     TSH  -     Vitamin D,25-Hydroxy    9. Abnormal finding of blood chemistry, unspecified  -     Hemoglobin A1c  -     Lipid Panel    10. Sinus congestion  -     Discontinue: levoFLOXacin (Levaquin) 500 MG tablet; Take 1 tablet by mouth Daily for 7 days.  Dispense: 7 tablet; Refill: 0  -     Discontinue: guaiFENesin (Mucinex) 600 MG 12 hr tablet; Take 1 tablet by mouth 2 (Two) Times a Day for 7 days.  Dispense: 14 tablet; Refill: 0  -     POC Rapid Strep A  -     POCT SARS-CoV-2 Antigen LINDA + Flu  -     guaiFENesin (Mucinex) 600 MG 12 hr tablet; Take 1 tablet by mouth 2 (Two)  Times a Day for 7 days.  Dispense: 14 tablet; Refill: 0  -     levoFLOXacin (Levaquin) 500 MG tablet; Take 1 tablet by mouth Daily for 7 days.  Dispense: 7 tablet; Refill: 0    11. Cough in adult  -     Discontinue: levoFLOXacin (Levaquin) 500 MG tablet; Take 1 tablet by mouth Daily for 7 days.  Dispense: 7 tablet; Refill: 0  -     Discontinue: guaiFENesin (Mucinex) 600 MG 12 hr tablet; Take 1 tablet by mouth 2 (Two) Times a Day for 7 days.  Dispense: 14 tablet; Refill: 0  -     POC Rapid Strep A  -     POCT SARS-CoV-2 Antigen LINDA + Flu  -     guaiFENesin (Mucinex) 600 MG 12 hr tablet; Take 1 tablet by mouth 2 (Two) Times a Day for 7 days.  Dispense: 14 tablet; Refill: 0  -     levoFLOXacin (Levaquin) 500 MG tablet; Take 1 tablet by mouth Daily for 7 days.  Dispense: 7 tablet; Refill: 0               BMI is within normal parameters. No other follow-up for BMI required.    Fasting labs.  Finish all antibiotics.  Mucinex as needed for cough/congestion symptoms.  Continue current medications.  Follow up as scheduled for routine follow up.  Follow up sooner for problems/concerns.  Patient verbalized understanding and agreement with plan of care.        This document has been electronically signed by Daria Douglass DNP, TEDDY on April 4, 2023 19:34 CDT

## 2023-04-11 ENCOUNTER — LAB (OUTPATIENT)
Dept: LAB | Facility: HOSPITAL | Age: 68
End: 2023-04-11
Payer: MEDICARE

## 2023-04-11 LAB
25(OH)D3 SERPL-MCNC: 47 NG/ML (ref 30–100)
ALBUMIN SERPL-MCNC: 4.2 G/DL (ref 3.5–5.2)
ALBUMIN/GLOB SERPL: 1.6 G/DL
ALP SERPL-CCNC: 73 U/L (ref 39–117)
ALT SERPL W P-5'-P-CCNC: 9 U/L (ref 1–33)
ANION GAP SERPL CALCULATED.3IONS-SCNC: 7 MMOL/L (ref 5–15)
AST SERPL-CCNC: 20 U/L (ref 1–32)
BASOPHILS # BLD AUTO: 0.04 10*3/MM3 (ref 0–0.2)
BASOPHILS NFR BLD AUTO: 1 % (ref 0–1.5)
BILIRUB SERPL-MCNC: 0.5 MG/DL (ref 0–1.2)
BUN SERPL-MCNC: 16 MG/DL (ref 8–23)
BUN/CREAT SERPL: 18.4 (ref 7–25)
CALCIUM SPEC-SCNC: 8.8 MG/DL (ref 8.6–10.5)
CHLORIDE SERPL-SCNC: 104 MMOL/L (ref 98–107)
CHOLEST SERPL-MCNC: 198 MG/DL (ref 0–200)
CO2 SERPL-SCNC: 28 MMOL/L (ref 22–29)
CREAT SERPL-MCNC: 0.87 MG/DL (ref 0.57–1)
DEPRECATED RDW RBC AUTO: 42.3 FL (ref 37–54)
EGFRCR SERPLBLD CKD-EPI 2021: 71.8 ML/MIN/1.73
EOSINOPHIL # BLD AUTO: 0.22 10*3/MM3 (ref 0–0.4)
EOSINOPHIL NFR BLD AUTO: 5.4 % (ref 0.3–6.2)
ERYTHROCYTE [DISTWIDTH] IN BLOOD BY AUTOMATED COUNT: 12.8 % (ref 12.3–15.4)
GLOBULIN UR ELPH-MCNC: 2.7 GM/DL
GLUCOSE SERPL-MCNC: 88 MG/DL (ref 65–99)
HBA1C MFR BLD: 5.5 % (ref 4.8–5.6)
HCT VFR BLD AUTO: 40.4 % (ref 34–46.6)
HDLC SERPL-MCNC: 74 MG/DL (ref 40–60)
HGB BLD-MCNC: 13.1 G/DL (ref 12–15.9)
IMM GRANULOCYTES # BLD AUTO: 0.01 10*3/MM3 (ref 0–0.05)
IMM GRANULOCYTES NFR BLD AUTO: 0.2 % (ref 0–0.5)
LDLC SERPL CALC-MCNC: 114 MG/DL (ref 0–100)
LDLC/HDLC SERPL: 1.54 {RATIO}
LYMPHOCYTES # BLD AUTO: 1.32 10*3/MM3 (ref 0.7–3.1)
LYMPHOCYTES NFR BLD AUTO: 32.3 % (ref 19.6–45.3)
MCH RBC QN AUTO: 29.4 PG (ref 26.6–33)
MCHC RBC AUTO-ENTMCNC: 32.4 G/DL (ref 31.5–35.7)
MCV RBC AUTO: 90.8 FL (ref 79–97)
MONOCYTES # BLD AUTO: 0.39 10*3/MM3 (ref 0.1–0.9)
MONOCYTES NFR BLD AUTO: 9.5 % (ref 5–12)
NEUTROPHILS NFR BLD AUTO: 2.11 10*3/MM3 (ref 1.7–7)
NEUTROPHILS NFR BLD AUTO: 51.6 % (ref 42.7–76)
NRBC BLD AUTO-RTO: 0 /100 WBC (ref 0–0.2)
PLATELET # BLD AUTO: 256 10*3/MM3 (ref 140–450)
PMV BLD AUTO: 11.3 FL (ref 6–12)
POTASSIUM SERPL-SCNC: 4.6 MMOL/L (ref 3.5–5.2)
PROT SERPL-MCNC: 6.9 G/DL (ref 6–8.5)
RBC # BLD AUTO: 4.45 10*6/MM3 (ref 3.77–5.28)
SODIUM SERPL-SCNC: 139 MMOL/L (ref 136–145)
TRIGL SERPL-MCNC: 51 MG/DL (ref 0–150)
TSH SERPL DL<=0.05 MIU/L-ACNC: 2.72 UIU/ML (ref 0.27–4.2)
VLDLC SERPL-MCNC: 10 MG/DL (ref 5–40)
WBC NRBC COR # BLD: 4.09 10*3/MM3 (ref 3.4–10.8)

## 2023-04-11 PROCEDURE — 80061 LIPID PANEL: CPT | Performed by: NURSE PRACTITIONER

## 2023-04-11 PROCEDURE — 80053 COMPREHEN METABOLIC PANEL: CPT | Performed by: NURSE PRACTITIONER

## 2023-04-11 PROCEDURE — 84443 ASSAY THYROID STIM HORMONE: CPT | Performed by: NURSE PRACTITIONER

## 2023-04-11 PROCEDURE — 85025 COMPLETE CBC W/AUTO DIFF WBC: CPT | Performed by: NURSE PRACTITIONER

## 2023-04-11 PROCEDURE — 82306 VITAMIN D 25 HYDROXY: CPT | Performed by: NURSE PRACTITIONER

## 2023-04-11 PROCEDURE — 83036 HEMOGLOBIN GLYCOSYLATED A1C: CPT | Performed by: NURSE PRACTITIONER

## 2023-04-17 ENCOUNTER — TELEPHONE (OUTPATIENT)
Dept: FAMILY MEDICINE CLINIC | Facility: CLINIC | Age: 68
End: 2023-04-17
Payer: MEDICARE

## 2023-04-17 NOTE — TELEPHONE ENCOUNTER
----- Message from Daria Douglass, ANNALISA, APRN sent at 4/13/2023  2:04 PM CDT -----  LDL cholesterol is elevated at 114.  Recommend avoiding excess greasy/fatty/fried foods and high fructose corn syrup.  Normal vitamin D.  Normal blood counts.  Normal thyroid.  Normal A1c.  Normal electrolytes, kidney function, liver function.

## 2023-04-25 RX ORDER — MECLIZINE HYDROCHLORIDE 25 MG/1
TABLET ORAL
Qty: 270 TABLET | Refills: 1 | Status: SHIPPED | OUTPATIENT
Start: 2023-04-25

## 2023-08-07 DIAGNOSIS — M81.0 AGE-RELATED OSTEOPOROSIS WITHOUT CURRENT PATHOLOGICAL FRACTURE: ICD-10-CM

## 2023-08-07 DIAGNOSIS — Z87.39 HISTORY OF OSTEOPOROSIS: Primary | ICD-10-CM

## 2023-08-14 ENCOUNTER — TELEPHONE (OUTPATIENT)
Dept: ONCOLOGY | Facility: HOSPITAL | Age: 68
End: 2023-08-14
Payer: MEDICARE

## 2023-08-14 ENCOUNTER — LAB (OUTPATIENT)
Dept: LAB | Facility: HOSPITAL | Age: 68
End: 2023-08-14
Payer: MEDICARE

## 2023-08-14 DIAGNOSIS — Z87.39 HISTORY OF OSTEOPOROSIS: ICD-10-CM

## 2023-08-14 DIAGNOSIS — M81.0 AGE-RELATED OSTEOPOROSIS WITHOUT CURRENT PATHOLOGICAL FRACTURE: ICD-10-CM

## 2023-08-14 PROCEDURE — 82310 ASSAY OF CALCIUM: CPT

## 2023-08-14 NOTE — TELEPHONE ENCOUNTER
Spoke with patient regarding the need for lab work prior to Prolia injection. She stated she would go to [facility] lab today. Orders are in.

## 2023-08-15 LAB — CALCIUM SPEC-SCNC: 9.1 MG/DL (ref 8.6–10.5)

## 2023-08-16 ENCOUNTER — INFUSION (OUTPATIENT)
Dept: ONCOLOGY | Facility: HOSPITAL | Age: 68
End: 2023-08-16
Payer: MEDICARE

## 2023-08-16 VITALS
TEMPERATURE: 96.2 F | RESPIRATION RATE: 18 BRPM | HEART RATE: 71 BPM | DIASTOLIC BLOOD PRESSURE: 86 MMHG | SYSTOLIC BLOOD PRESSURE: 182 MMHG

## 2023-08-16 DIAGNOSIS — M81.0 AGE-RELATED OSTEOPOROSIS WITHOUT CURRENT PATHOLOGICAL FRACTURE: ICD-10-CM

## 2023-08-16 DIAGNOSIS — Z87.39 HISTORY OF OSTEOPOROSIS: Primary | ICD-10-CM

## 2023-08-16 PROCEDURE — 25010000002 DENOSUMAB 60 MG/ML SOLUTION PREFILLED SYRINGE: Performed by: NURSE PRACTITIONER

## 2023-08-16 RX ADMIN — DENOSUMAB 60 MG: 60 INJECTION SUBCUTANEOUS at 10:06

## 2023-08-30 DIAGNOSIS — E55.9 VITAMIN D DEFICIENCY DISEASE: ICD-10-CM

## 2023-08-30 RX ORDER — ERGOCALCIFEROL 1.25 MG/1
CAPSULE ORAL
Qty: 12 CAPSULE | Refills: 3 | Status: SHIPPED | OUTPATIENT
Start: 2023-08-30

## 2023-09-11 ENCOUNTER — LAB (OUTPATIENT)
Dept: LAB | Facility: HOSPITAL | Age: 68
End: 2023-09-11
Payer: MEDICARE

## 2023-09-11 ENCOUNTER — OFFICE VISIT (OUTPATIENT)
Dept: FAMILY MEDICINE CLINIC | Facility: CLINIC | Age: 68
End: 2023-09-11
Payer: MEDICARE

## 2023-09-11 VITALS
WEIGHT: 152.8 LBS | BODY MASS INDEX: 23.16 KG/M2 | HEIGHT: 68 IN | SYSTOLIC BLOOD PRESSURE: 160 MMHG | TEMPERATURE: 98.4 F | OXYGEN SATURATION: 100 % | HEART RATE: 63 BPM | DIASTOLIC BLOOD PRESSURE: 82 MMHG

## 2023-09-11 DIAGNOSIS — R71.0 DECREASED HEMOGLOBIN: ICD-10-CM

## 2023-09-11 DIAGNOSIS — R42 DIZZINESS: Primary | ICD-10-CM

## 2023-09-11 DIAGNOSIS — R06.00 DYSPNEA, UNSPECIFIED TYPE: Primary | ICD-10-CM

## 2023-09-11 DIAGNOSIS — R39.9 UTI SYMPTOMS: ICD-10-CM

## 2023-09-11 DIAGNOSIS — R03.0 ELEVATED BLOOD PRESSURE READING IN OFFICE WITHOUT DIAGNOSIS OF HYPERTENSION: ICD-10-CM

## 2023-09-11 DIAGNOSIS — R42 DIZZINESS: ICD-10-CM

## 2023-09-11 LAB
ALBUMIN SERPL-MCNC: 4.4 G/DL (ref 3.5–5.2)
ALBUMIN/GLOB SERPL: 1.3 G/DL
ALP SERPL-CCNC: 68 U/L (ref 39–117)
ALT SERPL W P-5'-P-CCNC: 12 U/L (ref 1–33)
ANION GAP SERPL CALCULATED.3IONS-SCNC: 6 MMOL/L (ref 5–15)
AST SERPL-CCNC: 18 U/L (ref 1–32)
BILIRUB BLD-MCNC: NEGATIVE MG/DL
BILIRUB SERPL-MCNC: 0.4 MG/DL (ref 0–1.2)
BUN SERPL-MCNC: 9 MG/DL (ref 8–23)
BUN/CREAT SERPL: 12.2 (ref 7–25)
CALCIUM SPEC-SCNC: 9.2 MG/DL (ref 8.6–10.5)
CHLORIDE SERPL-SCNC: 104 MMOL/L (ref 98–107)
CLARITY, POC: CLEAR
CO2 SERPL-SCNC: 28 MMOL/L (ref 22–29)
COLOR UR: YELLOW
CREAT SERPL-MCNC: 0.74 MG/DL (ref 0.57–1)
D-DIMER, QUANTITATIVE (MAD,POW, STR): <270 NG/ML (FEU) (ref 0–700)
DEPRECATED RDW RBC AUTO: 45.3 FL (ref 37–54)
EGFRCR SERPLBLD CKD-EPI 2021: 87.2 ML/MIN/1.73
ERYTHROCYTE [DISTWIDTH] IN BLOOD BY AUTOMATED COUNT: 13.9 % (ref 12.3–15.4)
EXPIRATION DATE: NORMAL
FERRITIN SERPL-MCNC: 12.42 NG/ML (ref 13–150)
GLOBULIN UR ELPH-MCNC: 3.4 GM/DL
GLUCOSE SERPL-MCNC: 101 MG/DL (ref 65–99)
GLUCOSE UR STRIP-MCNC: NEGATIVE MG/DL
HCT VFR BLD AUTO: 36.2 % (ref 34–46.6)
HGB BLD-MCNC: 11.5 G/DL (ref 12–15.9)
IRON 24H UR-MRATE: 28 MCG/DL (ref 37–145)
IRON SATN MFR SERPL: 6 % (ref 20–50)
KETONES UR QL: NEGATIVE
LEUKOCYTE EST, POC: NEGATIVE
Lab: NORMAL
MAGNESIUM SERPL-MCNC: 2.1 MG/DL (ref 1.6–2.4)
MCH RBC QN AUTO: 28.4 PG (ref 26.6–33)
MCHC RBC AUTO-ENTMCNC: 31.8 G/DL (ref 31.5–35.7)
MCV RBC AUTO: 89.4 FL (ref 79–97)
NITRITE UR-MCNC: NEGATIVE MG/ML
NT-PROBNP SERPL-MCNC: 178.5 PG/ML (ref 0–900)
PH UR: 7 [PH] (ref 5–8)
PLATELET # BLD AUTO: 325 10*3/MM3 (ref 140–450)
PMV BLD AUTO: 10.8 FL (ref 6–12)
POTASSIUM SERPL-SCNC: 4.3 MMOL/L (ref 3.5–5.2)
PROT SERPL-MCNC: 7.8 G/DL (ref 6–8.5)
PROT UR STRIP-MCNC: NEGATIVE MG/DL
RBC # BLD AUTO: 4.05 10*6/MM3 (ref 3.77–5.28)
RBC # UR STRIP: NEGATIVE /UL
SODIUM SERPL-SCNC: 138 MMOL/L (ref 136–145)
SP GR UR: 1.01 (ref 1–1.03)
TIBC SERPL-MCNC: 508 MCG/DL (ref 298–536)
TRANSFERRIN SERPL-MCNC: 341 MG/DL (ref 200–360)
UROBILINOGEN UR QL: NORMAL
WBC NRBC COR # BLD: 7.99 10*3/MM3 (ref 3.4–10.8)

## 2023-09-11 PROCEDURE — 93005 ELECTROCARDIOGRAM TRACING: CPT | Performed by: NURSE PRACTITIONER

## 2023-09-11 PROCEDURE — 1159F MED LIST DOCD IN RCRD: CPT | Performed by: NURSE PRACTITIONER

## 2023-09-11 PROCEDURE — 83735 ASSAY OF MAGNESIUM: CPT | Performed by: NURSE PRACTITIONER

## 2023-09-11 PROCEDURE — 99215 OFFICE O/P EST HI 40 MIN: CPT | Performed by: NURSE PRACTITIONER

## 2023-09-11 PROCEDURE — 81015 MICROSCOPIC EXAM OF URINE: CPT | Performed by: NURSE PRACTITIONER

## 2023-09-11 PROCEDURE — 80053 COMPREHEN METABOLIC PANEL: CPT | Performed by: NURSE PRACTITIONER

## 2023-09-11 PROCEDURE — 85379 FIBRIN DEGRADATION QUANT: CPT | Performed by: NURSE PRACTITIONER

## 2023-09-11 PROCEDURE — 84466 ASSAY OF TRANSFERRIN: CPT | Performed by: NURSE PRACTITIONER

## 2023-09-11 PROCEDURE — 93010 ELECTROCARDIOGRAM REPORT: CPT | Performed by: INTERNAL MEDICINE

## 2023-09-11 PROCEDURE — 1160F RVW MEDS BY RX/DR IN RCRD: CPT | Performed by: NURSE PRACTITIONER

## 2023-09-11 PROCEDURE — 82728 ASSAY OF FERRITIN: CPT | Performed by: NURSE PRACTITIONER

## 2023-09-11 PROCEDURE — 81003 URINALYSIS AUTO W/O SCOPE: CPT | Performed by: NURSE PRACTITIONER

## 2023-09-11 PROCEDURE — 87086 URINE CULTURE/COLONY COUNT: CPT | Performed by: NURSE PRACTITIONER

## 2023-09-11 PROCEDURE — 83880 ASSAY OF NATRIURETIC PEPTIDE: CPT | Performed by: NURSE PRACTITIONER

## 2023-09-11 PROCEDURE — 85027 COMPLETE CBC AUTOMATED: CPT | Performed by: NURSE PRACTITIONER

## 2023-09-11 PROCEDURE — 83540 ASSAY OF IRON: CPT | Performed by: NURSE PRACTITIONER

## 2023-09-11 NOTE — PROGRESS NOTES
"Chief Complaint  Urinary Tract Infection (Pt states possible UTI. Pt states that they are urinating frequently. Pt states that this has been going on about X 4-5 days.)    Subjective          Genesis Mery Salgado presents to [facility] [place]    History of Present Illness  [facility]      PCP: TEDDY Gloria    CC: \"possible UTI'    Reports she did some lifting in the last week and mowing, unsure if she pulled a muscle, but has noted some urinary frequency/burning.  Hx of prolapsed bladder in the past per her report and did some pelvic floor therapy a few years ago.  Denies dysuria or vaginal symptoms.  No fever, body aches, chills.  Reports while mowing last week, ran over a yellow jacket next and got stung on the face/ears five times, took some old prednisone that she had and this seems better.  Also reports for the last few months, she has had some increased dyspnea and chest discomfort which she attributed to her hiatal hernia, having to rest more than normal.  Has also had some intermittent dizziness which she attributed to her allergies. Reports muscle cramps, but drinking pickle juice, eating mustard or ice with salt prior to working outdoors and that seems to help.  Has a slight headache today.  BP elevated today, not currently being treated for HTN.  Hx of lupus and on plaquenil.       Review of Systems   Constitutional: Negative.    Eyes: Negative.    Respiratory:  Positive for chest tightness and shortness of breath (more with activity). Negative for cough and wheezing.    Cardiovascular:  Negative for palpitations and leg swelling. Chest pain: center of chest, she attributes to hiatal hernia.  Gastrointestinal:  Positive for abdominal pain (upper abdominal into chest). Negative for diarrhea, nausea and vomiting.   Genitourinary:  Positive for frequency and urgency. Negative for dysuria.   Musculoskeletal:  Negative for myalgias.   Skin: " "Negative.         Had some yellow jacket stings to face/ear last week, seem to have resolved   Neurological:  Positive for dizziness (on/off) and headaches (slight).      Objective   Vital Signs:   /82 (BP Location: Right arm, Patient Position: Sitting, Cuff Size: Adult)   Pulse 63   Temp 98.4 °F (36.9 °C) (Temporal)   Ht 172.7 cm (68\")   Wt 69.3 kg (152 lb 12.8 oz)   SpO2 100%   BMI 23.23 kg/m²       Physical Exam  Vitals and nursing note reviewed.   Constitutional:       General: She is not in acute distress.     Appearance: Normal appearance. She is not ill-appearing.   HENT:      Head: Normocephalic and atraumatic.      Right Ear: Tympanic membrane and ear canal normal.      Left Ear: Tympanic membrane and ear canal normal.      Nose: Nose normal.      Mouth/Throat:      Mouth: Mucous membranes are moist.      Pharynx: No oropharyngeal exudate or posterior oropharyngeal erythema.   Eyes:      General:         Right eye: No discharge.         Left eye: No discharge.      Conjunctiva/sclera: Conjunctivae normal.   Cardiovascular:      Rate and Rhythm: Rhythm irregular.   Pulmonary:      Effort: Pulmonary effort is normal. No respiratory distress.      Breath sounds: Normal breath sounds. No wheezing, rhonchi or rales.   Abdominal:      General: Bowel sounds are normal.      Palpations: Abdomen is soft.      Tenderness: There is no abdominal tenderness. There is no right CVA tenderness, left CVA tenderness, guarding or rebound.   Musculoskeletal:      Cervical back: Neck supple. No tenderness.      Right lower leg: No edema.      Left lower leg: No edema.   Lymphadenopathy:      Cervical: No cervical adenopathy.   Skin:     General: Skin is warm and dry.   Neurological:      General: No focal deficit present.      Mental Status: She is alert and oriented to person, place, and time.   Psychiatric:         Mood and Affect: Mood normal.        Result Review :     Common labs          2/8/2023    12:27 " 4/11/2023    08:22 8/14/2023    16:24   Common Labs   Glucose  88     BUN  16     Creatinine  0.87     Sodium  139     Potassium  4.6     Chloride  104     Calcium 9.6  8.8  9.1    Albumin  4.2     Total Bilirubin  0.5     Alkaline Phosphatase  73     AST (SGOT)  20     ALT (SGPT)  9     WBC  4.09     Hemoglobin  13.1     Hematocrit  40.4     Platelets  256     Total Cholesterol  198     Triglycerides  51     HDL Cholesterol  74     LDL Cholesterol   114     Hemoglobin A1C  5.50       TSH          4/11/2023    08:22   TSH   TSH 2.720      EKG: Sinus rhythm with PAC's noted.  Official cardiology read pending.     Recent Results (from the past 24 hour(s))   POCT urinalysis dipstick, automated    Collection Time: 09/11/23  9:55 AM    Specimen: Urine   Result Value Ref Range    Color Yellow Yellow, Straw, Dark Yellow, Elva    Clarity, UA Clear Clear    Specific Gravity  1.015 1.005 - 1.030    pH, Urine 7.0 5.0 - 8.0    Leukocytes Negative Negative    Nitrite, UA Negative Negative    Protein, POC Negative Negative mg/dL    Glucose, UA Negative Negative mg/dL    Ketones, UA Negative Negative    Urobilinogen, UA 0.2 E.U./dL Normal, 0.2 E.U./dL    Bilirubin Negative Negative    Blood, UA Negative Negative    Lot Number [id number]     Expiration Date 10-    ECG 12 Lead    Collection Time: 09/11/23 10:32 AM   Result Value Ref Range    QT Interval 388 ms    QTC Interval 412 ms                 Assessment and Plan    Diagnoses and all orders for this visit:    1. Dyspnea, unspecified type (Primary)  -     ECG 12 Lead  -     XR Chest PA & Lateral  -     CBC No Differential  -     D-dimer, Quantitative  -     Magnesium  -     Comprehensive metabolic panel  -     BNP    2. UTI symptoms  -     POCT urinalysis dipstick, automated  -     Urinalysis, Microscopic Only - Urine, Clean Catch  -     Urine Culture - Urine, Urine, Clean Catch    3. Dizziness  -     ECG 12 Lead  -     CBC No Differential  -     D-dimer,  Quantitative  -     Magnesium  -     Comprehensive metabolic panel  -     BNP    4. Elevated blood pressure reading in office without diagnosis of hypertension  -     ECG 12 Lead  -     XR Chest PA & Lateral  -     CBC No Differential  -     D-dimer, Quantitative  -     Magnesium  -     Comprehensive metabolic panel  -     BNP    Additional work up as above--labs, CXR--will notify with results when available  Has PCP f/u on 9- and can recheck BP at time.  BP was also noted to be elevated at visit on 8-.  Encouraged to increase water, limit salt intake.     May continue with cranberry pills PRN for UTI symptoms.   Urine micro and culture are pending--will notify with results when available and treat as needed.     See PCP or RTC if symptoms persist/worsen  See PCP for routine f/u visit and management of chronic medical conditions    This document has been electronically signed by TEDDY Walters on September 11, 2023 11:16 CDT,.    I spent 40 minutes caring for Genesis on this date of service. This time includes time spent by me in the following activities:preparing for the visit, reviewing tests, performing a medically appropriate examination and/or evaluation , counseling and educating the patient/family/caregiver, ordering medications, tests, or procedures, and documenting information in the medical record

## 2023-09-12 LAB
BACTERIA UR QL AUTO: NORMAL /HPF
HYALINE CASTS UR QL AUTO: NORMAL /LPF
RBC # UR STRIP: NORMAL /HPF
REF LAB TEST METHOD: NORMAL
SQUAMOUS #/AREA URNS HPF: NORMAL /HPF
WBC # UR STRIP: NORMAL /HPF

## 2023-09-13 DIAGNOSIS — E61.1 IRON DEFICIENCY: Primary | ICD-10-CM

## 2023-09-13 LAB
BACTERIA SPEC AEROBE CULT: NO GROWTH
QT INTERVAL: 388 MS
QTC INTERVAL: 412 MS

## 2023-09-13 RX ORDER — LANOLIN ALCOHOL/MO/W.PET/CERES
325 CREAM (GRAM) TOPICAL
Qty: 90 TABLET | Refills: 1 | Status: SHIPPED | OUTPATIENT
Start: 2023-09-13 | End: 2024-09-12

## 2023-09-22 ENCOUNTER — OFFICE VISIT (OUTPATIENT)
Dept: FAMILY MEDICINE CLINIC | Facility: CLINIC | Age: 68
End: 2023-09-22
Payer: MEDICARE

## 2023-09-22 VITALS
HEART RATE: 71 BPM | DIASTOLIC BLOOD PRESSURE: 70 MMHG | HEIGHT: 68 IN | RESPIRATION RATE: 20 BRPM | TEMPERATURE: 97.7 F | BODY MASS INDEX: 22.88 KG/M2 | WEIGHT: 151 LBS | SYSTOLIC BLOOD PRESSURE: 120 MMHG | OXYGEN SATURATION: 98 %

## 2023-09-22 DIAGNOSIS — Z85.038 HISTORY OF MALIGNANT NEOPLASM OF COLON: ICD-10-CM

## 2023-09-22 DIAGNOSIS — M54.6 ACUTE BILATERAL THORACIC BACK PAIN: Primary | ICD-10-CM

## 2023-09-22 DIAGNOSIS — M32.19 OTHER SYSTEMIC LUPUS ERYTHEMATOSUS WITH OTHER ORGAN INVOLVEMENT: Chronic | ICD-10-CM

## 2023-09-22 DIAGNOSIS — E55.9 VITAMIN D DEFICIENCY DISEASE: Chronic | ICD-10-CM

## 2023-09-22 DIAGNOSIS — M54.50 ACUTE BILATERAL LOW BACK PAIN WITHOUT SCIATICA: ICD-10-CM

## 2023-09-22 DIAGNOSIS — E61.1 IRON DEFICIENCY: Chronic | ICD-10-CM

## 2023-09-22 PROCEDURE — 1159F MED LIST DOCD IN RCRD: CPT | Performed by: NURSE PRACTITIONER

## 2023-09-22 PROCEDURE — 99214 OFFICE O/P EST MOD 30 MIN: CPT | Performed by: NURSE PRACTITIONER

## 2023-09-22 PROCEDURE — 1160F RVW MEDS BY RX/DR IN RCRD: CPT | Performed by: NURSE PRACTITIONER

## 2023-09-26 ENCOUNTER — TELEPHONE (OUTPATIENT)
Dept: FAMILY MEDICINE CLINIC | Facility: CLINIC | Age: 68
End: 2023-09-26
Payer: MEDICARE

## 2023-09-26 NOTE — TELEPHONE ENCOUNTER
----- Message from Daria Douglass, ANNALISA, APRN sent at 9/22/2023  1:50 PM CDT -----  Moderate spondylosis and facet arthropathy at L4-L5 and L5-S1.    Pt wants to know if you were finding her a back brace, does she need an MRI, and do you want her to continue taking the medication you gave her?

## 2023-09-29 NOTE — TELEPHONE ENCOUNTER
RX for back brace with suspenders written.  I am happy to request an MRI for her if she would like to proceed with that.   Which medication is she referencing?

## 2023-09-29 NOTE — PROGRESS NOTES
Subjective   Genesis Salgado is a 68 y.o. female.     History of Present Illness  She presents today for her routine follow-up on chronic medical problems.  She reports that her allergy symptoms are currently fairly well controlled.  She would like something to help with this if possible.  Her motion sickness is well controlled.  She continues to follow-up with rheumatology regarding her SLE.  She is tolerating her calcium and vitamin D supplements without side effect.  She reports that her heartburn is well controlled with Dexilant.  Her routine fasting labs are up-to-date.  Her BMI is currently 23%.  She reports that she was recently seen in the walk-in clinic and diagnosed with iron deficiency.  She has been taking oral iron.  She does have a history of colon cancer.  She has not followed up with oncology recently.  She is open to a referral today in the office.  She is also been experiencing pain in her mid and lower back.  She does have a history of compression fractures in the past.  She would like to have this evaluated.  She would also like a prescription for a back brace.  She is otherwise without any other new complaints today in the office.     The following portions of the patient's history were reviewed and updated as appropriate: allergies, current medications, past family history, past medical history, past social history, past surgical history and problem list.    Review of Systems   Constitutional:  Positive for fatigue. Negative for unexpected weight loss.   HENT: Negative.     Eyes: Negative.    Respiratory: Negative.  Negative for choking and wheezing.    Cardiovascular: Negative.  Negative for chest pain.   Gastrointestinal: Negative.  Negative for abdominal pain and nausea.   Musculoskeletal:  Positive for arthralgias, back pain and myalgias.   Skin: Negative.    Allergic/Immunologic: Negative.    Neurological: Negative.    Hematological: Negative.    Psychiatric/Behavioral: Negative.        Objective   Physical Exam  Vitals reviewed.   Constitutional:       General: She is not in acute distress.     Appearance: She is well-developed. She is not diaphoretic.   HENT:      Head: Normocephalic.      Right Ear: External ear normal.      Left Ear: External ear normal.      Nose: Nose normal.   Eyes:      Pupils: Pupils are equal, round, and reactive to light.   Neck:      Thyroid: No thyromegaly.      Vascular: No JVD.   Cardiovascular:      Rate and Rhythm: Normal rate and regular rhythm.      Heart sounds: No murmur heard.    No friction rub. No gallop.   Pulmonary:      Effort: Pulmonary effort is normal. No respiratory distress.      Breath sounds: Normal breath sounds. No wheezing or rales.   Musculoskeletal:         General: Normal range of motion.      Cervical back: Normal range of motion and neck supple.      Thoracic back: Spasms and tenderness present. Decreased range of motion.      Lumbar back: Spasms and tenderness present. Decreased range of motion.   Skin:     General: Skin is warm and dry.      Coloration: Skin is not pale.      Findings: No erythema or rash.   Neurological:      Mental Status: She is alert and oriented to person, place, and time.   Psychiatric:         Behavior: Behavior normal.         Thought Content: Thought content normal.         Judgment: Judgment normal.         Assessment & Plan   Diagnoses and all orders for this visit:    1. Acute bilateral thoracic back pain (Primary)  -     XR spine thoracic 3 vw  -     XR Spine Lumbar 4+ View    2. Acute bilateral low back pain without sciatica  -     XR spine thoracic 3 vw  -     XR Spine Lumbar 4+ View    3. Vitamin D deficiency disease    4. Other systemic lupus erythematosus with other organ involvement    5. History of malignant neoplasm of colon  -     Ambulatory Referral to Hematology / Oncology    6. Iron deficiency  -     Ambulatory Referral to Hematology / Oncology               BMI is within normal parameters. No  other follow-up for BMI required.    X-ray following office visit.  Referral to hematology due to iron deficiency and history of malignant neoplasm of colon.  Continue current medications.  Follow up as scheduled for routine follow up.  Follow up sooner for problems/concerns.  Patient verbalized understanding and agreement with plan of care.        This document has been electronically signed by Daria Douglass DNP, TEDDY on September 29, 2023 09:45 CDT